# Patient Record
Sex: FEMALE | Race: BLACK OR AFRICAN AMERICAN | NOT HISPANIC OR LATINO | Employment: FULL TIME | URBAN - METROPOLITAN AREA
[De-identification: names, ages, dates, MRNs, and addresses within clinical notes are randomized per-mention and may not be internally consistent; named-entity substitution may affect disease eponyms.]

---

## 2017-03-03 DIAGNOSIS — M54.32 SCIATICA OF LEFT SIDE: ICD-10-CM

## 2017-03-03 RX ORDER — DICLOFENAC SODIUM 50 MG/1
50 TABLET, DELAYED RELEASE ORAL 2 TIMES DAILY
Qty: 60 TABLET | Refills: 2 | Status: SHIPPED | OUTPATIENT
Start: 2017-03-03 | End: 2017-03-17

## 2017-03-03 NOTE — TELEPHONE ENCOUNTER
----- Message from Cecelia Mccarty sent at 3/3/2017 11:17 AM CST -----  Contact: [facility]  REFILL: diclofenac (VOLTAREN) 50 MG EC tablet    PLEASE SEND TO [facility]

## 2017-03-17 ENCOUNTER — OFFICE VISIT (OUTPATIENT)
Dept: FAMILY MEDICINE | Facility: CLINIC | Age: 62
End: 2017-03-17
Payer: COMMERCIAL

## 2017-03-17 ENCOUNTER — LAB VISIT (OUTPATIENT)
Dept: LAB | Facility: HOSPITAL | Age: 62
End: 2017-03-17
Attending: FAMILY MEDICINE
Payer: COMMERCIAL

## 2017-03-17 VITALS
HEART RATE: 105 BPM | SYSTOLIC BLOOD PRESSURE: 100 MMHG | TEMPERATURE: 98 F | WEIGHT: 293 LBS | OXYGEN SATURATION: 97 % | HEIGHT: 62 IN | BODY MASS INDEX: 53.92 KG/M2 | DIASTOLIC BLOOD PRESSURE: 72 MMHG

## 2017-03-17 DIAGNOSIS — I10 ESSENTIAL HYPERTENSION: ICD-10-CM

## 2017-03-17 DIAGNOSIS — I10 ESSENTIAL HYPERTENSION: Primary | ICD-10-CM

## 2017-03-17 DIAGNOSIS — R73.03 PREDIABETES: ICD-10-CM

## 2017-03-17 DIAGNOSIS — E66.01 MORBID OBESITY WITH BMI OF 60.0-69.9, ADULT: ICD-10-CM

## 2017-03-17 LAB
ALBUMIN SERPL BCP-MCNC: 3.7 G/DL
ALP SERPL-CCNC: 85 U/L
ALT SERPL W/O P-5'-P-CCNC: 25 U/L
ANION GAP SERPL CALC-SCNC: 9 MMOL/L
AST SERPL-CCNC: 23 U/L
BASOPHILS # BLD AUTO: 0.01 K/UL
BASOPHILS NFR BLD: 0.1 %
BILIRUB SERPL-MCNC: 0.6 MG/DL
BUN SERPL-MCNC: 14 MG/DL
CALCIUM SERPL-MCNC: 9.8 MG/DL
CHLORIDE SERPL-SCNC: 103 MMOL/L
CHOLEST/HDLC SERPL: 5.3 {RATIO}
CO2 SERPL-SCNC: 29 MMOL/L
CREAT SERPL-MCNC: 0.8 MG/DL
DIFFERENTIAL METHOD: NORMAL
EOSINOPHIL # BLD AUTO: 0.1 K/UL
EOSINOPHIL NFR BLD: 0.9 %
ERYTHROCYTE [DISTWIDTH] IN BLOOD BY AUTOMATED COUNT: 14.3 %
EST. GFR  (AFRICAN AMERICAN): >60 ML/MIN/1.73 M^2
EST. GFR  (NON AFRICAN AMERICAN): >60 ML/MIN/1.73 M^2
GLUCOSE SERPL-MCNC: 106 MG/DL
HCT VFR BLD AUTO: 41.1 %
HDL/CHOLESTEROL RATIO: 18.8 %
HDLC SERPL-MCNC: 191 MG/DL
HDLC SERPL-MCNC: 36 MG/DL
HGB BLD-MCNC: 13.3 G/DL
LDLC SERPL CALC-MCNC: 135.6 MG/DL
LYMPHOCYTES # BLD AUTO: 1.6 K/UL
LYMPHOCYTES NFR BLD: 20.1 %
MCH RBC QN AUTO: 27 PG
MCHC RBC AUTO-ENTMCNC: 32.4 %
MCV RBC AUTO: 83 FL
MONOCYTES # BLD AUTO: 0.6 K/UL
MONOCYTES NFR BLD: 7 %
NEUTROPHILS # BLD AUTO: 5.8 K/UL
NEUTROPHILS NFR BLD: 71.7 %
NONHDLC SERPL-MCNC: 155 MG/DL
PLATELET # BLD AUTO: 223 K/UL
PMV BLD AUTO: 11.7 FL
POTASSIUM SERPL-SCNC: 4 MMOL/L
PROT SERPL-MCNC: 8.2 G/DL
RBC # BLD AUTO: 4.93 M/UL
SODIUM SERPL-SCNC: 141 MMOL/L
TRIGL SERPL-MCNC: 97 MG/DL
WBC # BLD AUTO: 8.1 K/UL

## 2017-03-17 PROCEDURE — 3078F DIAST BP <80 MM HG: CPT | Mod: S$GLB,,, | Performed by: FAMILY MEDICINE

## 2017-03-17 PROCEDURE — 36415 COLL VENOUS BLD VENIPUNCTURE: CPT | Mod: PO

## 2017-03-17 PROCEDURE — 99214 OFFICE O/P EST MOD 30 MIN: CPT | Mod: S$GLB,,, | Performed by: FAMILY MEDICINE

## 2017-03-17 PROCEDURE — 1160F RVW MEDS BY RX/DR IN RCRD: CPT | Mod: S$GLB,,, | Performed by: FAMILY MEDICINE

## 2017-03-17 PROCEDURE — 3074F SYST BP LT 130 MM HG: CPT | Mod: S$GLB,,, | Performed by: FAMILY MEDICINE

## 2017-03-17 PROCEDURE — 80061 LIPID PANEL: CPT

## 2017-03-17 PROCEDURE — 99999 PR PBB SHADOW E&M-EST. PATIENT-LVL III: CPT | Mod: PBBFAC,,, | Performed by: FAMILY MEDICINE

## 2017-03-17 PROCEDURE — 80053 COMPREHEN METABOLIC PANEL: CPT

## 2017-03-17 PROCEDURE — 85025 COMPLETE CBC W/AUTO DIFF WBC: CPT

## 2017-03-17 PROCEDURE — 83036 HEMOGLOBIN GLYCOSYLATED A1C: CPT

## 2017-03-17 RX ORDER — DIETHYLPROPION HYDROCHLORIDE 75 MG/1
75 TABLET, EXTENDED RELEASE ORAL DAILY
Qty: 30 TABLET | Refills: 0 | Status: SHIPPED | OUTPATIENT
Start: 2017-03-17 | End: 2017-03-17 | Stop reason: SDUPTHER

## 2017-03-17 RX ORDER — DIETHYLPROPION HYDROCHLORIDE 75 MG/1
75 TABLET, EXTENDED RELEASE ORAL DAILY
Qty: 30 TABLET | Refills: 0 | Status: SHIPPED | OUTPATIENT
Start: 2017-03-17 | End: 2017-06-27 | Stop reason: SDUPTHER

## 2017-03-17 NOTE — PROGRESS NOTES
Office Visit    Patient Name: Hilary Wilson    : 1955  MRN: [id number]    Subjective:  Hilary is a 61 y.o. female who presents today for:    Establish Care (discuss weightloss) and Recurrent Skin Infections (vaginal area )      This patient has multiple medical diagnoses as noted below.  This patient is new to me.  She would like to improve her health.  She would like to change her habits.  She wants to change from not doing something to doing something.  Medication did help with her effort.  She would like to join weight watchers.  She would like a support system.  Her goal weight is around 175 (no established goal but would like to be around this weight.  Her greatest hinderance in her diet is the not eating.  She eats once a day.  She cares for her mom.  Her job requires walking from door to door.  She wants to improve her health.          Active Problem List  Patient Active Problem List   Diagnosis    Hypertension    Prediabetes    JOSUÉ on CPAP       Past Surgical History  Past Surgical History:   Procedure Laterality Date    BREAST SURGERY      biopsy-benign    COLONOSCOPY N/A 11/3/2016    Procedure: COLONOSCOPY;  Surgeon: Zhang Diez MD;  Location: [facility];  Service: Endoscopy;  Laterality: N/A;    mass removal      TUBAL LIGATION         Family History  Family History   Problem Relation Age of Onset    Hypertension Mother     Kidney disease Mother     Diabetes Father     Stroke Father     Heart disease Sister 49    Kidney disease Sister     Stroke Sister      in 50s       Social History  Social History     Social History    Marital status:      Spouse name: N/A    Number of children: N/A    Years of education: N/A     Occupational History    Not on file.     Social History Main Topics    Smoking status: Never Smoker    Smokeless tobacco: Not on file    Alcohol use No    Drug use: Not on file    Sexual activity: Not on file     Other Topics Concern    Not on file  "    Social History Narrative       Current Medications  Medications reviewed and updated.     Allergies   Review of patient's allergies indicates:  No Known Allergies    Review of Systems (Pertinent positives)  Review of Systems   Constitutional: Negative for activity change, appetite change, fatigue, fever and unexpected weight change.   HENT: Negative.  Negative for ear discharge, ear pain, rhinorrhea and sore throat.    Eyes: Negative.    Respiratory: Negative for apnea, cough, chest tightness, shortness of breath and wheezing.    Cardiovascular: Negative for chest pain, palpitations and leg swelling.   Gastrointestinal: Negative for abdominal distention, abdominal pain, constipation, diarrhea and vomiting.   Endocrine: Negative for cold intolerance, heat intolerance, polydipsia and polyuria.   Genitourinary: Negative for decreased urine volume, menstrual problem, urgency, vaginal bleeding, vaginal discharge and vaginal pain.   Musculoskeletal: Negative.    Skin: Negative for rash.   Neurological: Negative for dizziness and headaches.   Hematological: Does not bruise/bleed easily.   Psychiatric/Behavioral: Negative for agitation, sleep disturbance and suicidal ideas.       /72 (BP Location: Left arm, Patient Position: Sitting, BP Method: Manual)  Pulse 105  Temp 98.4 °F (36.9 °C) (Oral)   Ht 5' 2" (1.575 m)  Wt (!) 156.8 kg (345 lb 10.9 oz)  LMP 05/23/2014  SpO2 97%  BMI 63.23 kg/m2    Physical Exam   Constitutional: She is oriented to person, place, and time. She appears well-developed and well-nourished.   HENT:   Head: Normocephalic and atraumatic.   Right Ear: External ear normal.   Left Ear: External ear normal.   Nose: Nose normal.   Mouth/Throat: Oropharynx is clear and moist.   Eyes: Conjunctivae and EOM are normal. Pupils are equal, round, and reactive to light.   Neck: Normal range of motion. No JVD present. No thyromegaly present.   Cardiovascular: Normal rate, regular rhythm and normal " heart sounds.    Pulmonary/Chest: Effort normal and breath sounds normal. She has no wheezes.   Abdominal: Soft. Bowel sounds are normal. She exhibits no distension. There is no tenderness.   Musculoskeletal: Normal range of motion.   Lymphadenopathy:     She has no cervical adenopathy.   Neurological: She is alert and oriented to person, place, and time.   Skin: Skin is warm and dry.   Psychiatric: She has a normal mood and affect. Her behavior is normal.   Vitals reviewed.      Health Maintenance  Health Maintenance Topics with due status: Not Due       Topic Last Completion Date    Pap Smear 07/15/2015    Mammogram 11/02/2016    Colonoscopy 11/03/2016    Lipid Panel 03/17/2017       Assessment/Plan:  Hilary Wilson is a 61 y.o. female who presents today for :    Hilary was seen today for establish care and recurrent skin infections.    Diagnoses and all orders for this visit:    Essential hypertension  -     CBC auto differential; Future  -     Comprehensive metabolic panel; Future  -     Lipid panel; Future  -     Hemoglobin A1c; Future  -  Pt is currently stable on medication regimen.  Continue current therapy as scheduled.  Contact office with any questions about adjustments on medications.     Prediabetes  -     Comprehensive metabolic panel; Future  -     Hemoglobin A1c; Future  -  She will need to focus on weight loss at this time   -  Discussed in detail the type of diet to address both her blood pressure and diabetes     Morbid obesity with BMI of 60.0-69.9, adult  -     Discontinue: diethylpropion 75 mg TbSR; Take 75 mg by mouth once daily.  -     diethylpropion 75 mg TbSR; Take 75 mg by mouth once daily.  -   I have discussed the common side effects of this medication with the patient and answered all of the questions they had at the time of this visit regarding this medication.  -- follow up in 4 weeks     Other orders  -     Cancel: Tdap Vaccine      No Follow-up on file.

## 2017-03-18 LAB
ESTIMATED AVG GLUCOSE: 137 MG/DL
HBA1C MFR BLD HPLC: 6.4 %

## 2017-03-20 ENCOUNTER — TELEPHONE (OUTPATIENT)
Dept: FAMILY MEDICINE | Facility: CLINIC | Age: 62
End: 2017-03-20

## 2017-03-20 NOTE — TELEPHONE ENCOUNTER
----- Message from Landon Woods sent at 3/9/2017 11:11 AM CST -----  Contact: Daughter/[phone]  Patient would like to speak to the staff regarding a personal matter. She states that she has an appointment tomorrow. Thank you.

## 2017-03-21 ENCOUNTER — TELEPHONE (OUTPATIENT)
Dept: FAMILY MEDICINE | Facility: CLINIC | Age: 62
End: 2017-03-21

## 2017-03-21 NOTE — TELEPHONE ENCOUNTER
----- Message from Ene Dorantes MD sent at 3/20/2017  9:36 AM CDT -----  Your are still a prediabetic.  You have not changed from last year.  Remember to focus on your diet at this time.  We can help you reach your goal.  I will see you in four weeks.

## 2017-04-19 RX ORDER — SIMVASTATIN 20 MG/1
20 TABLET, FILM COATED ORAL NIGHTLY
Qty: 90 TABLET | Refills: 0 | Status: SHIPPED | OUTPATIENT
Start: 2017-04-19 | End: 2018-09-19

## 2017-04-19 RX ORDER — ALBUTEROL SULFATE 90 UG/1
AEROSOL, METERED RESPIRATORY (INHALATION)
Qty: 3 INHALER | Refills: 2 | Status: SHIPPED | OUTPATIENT
Start: 2017-04-19 | End: 2017-08-25 | Stop reason: SDUPTHER

## 2017-06-12 ENCOUNTER — OFFICE VISIT (OUTPATIENT)
Dept: FAMILY MEDICINE | Facility: CLINIC | Age: 62
End: 2017-06-12
Payer: COMMERCIAL

## 2017-06-12 ENCOUNTER — TELEPHONE (OUTPATIENT)
Dept: FAMILY MEDICINE | Facility: CLINIC | Age: 62
End: 2017-06-12

## 2017-06-12 VITALS
HEIGHT: 62 IN | SYSTOLIC BLOOD PRESSURE: 124 MMHG | RESPIRATION RATE: 18 BRPM | TEMPERATURE: 99 F | OXYGEN SATURATION: 98 % | HEART RATE: 100 BPM | BODY MASS INDEX: 53.92 KG/M2 | DIASTOLIC BLOOD PRESSURE: 82 MMHG | WEIGHT: 293 LBS

## 2017-06-12 DIAGNOSIS — M54.42 ACUTE BILATERAL LOW BACK PAIN WITH BILATERAL SCIATICA: Primary | ICD-10-CM

## 2017-06-12 DIAGNOSIS — M54.41 ACUTE BILATERAL LOW BACK PAIN WITH BILATERAL SCIATICA: Primary | ICD-10-CM

## 2017-06-12 DIAGNOSIS — G47.33 OSA ON CPAP: ICD-10-CM

## 2017-06-12 DIAGNOSIS — I10 ESSENTIAL HYPERTENSION: ICD-10-CM

## 2017-06-12 DIAGNOSIS — R73.03 PREDIABETES: ICD-10-CM

## 2017-06-12 PROCEDURE — 99214 OFFICE O/P EST MOD 30 MIN: CPT | Mod: 25,S$GLB,, | Performed by: INTERNAL MEDICINE

## 2017-06-12 PROCEDURE — 99999 PR PBB SHADOW E&M-EST. PATIENT-LVL III: CPT | Mod: PBBFAC,,, | Performed by: INTERNAL MEDICINE

## 2017-06-12 PROCEDURE — 96372 THER/PROPH/DIAG INJ SC/IM: CPT | Mod: S$GLB,,, | Performed by: INTERNAL MEDICINE

## 2017-06-12 RX ORDER — TRAMADOL HYDROCHLORIDE 50 MG/1
50 TABLET ORAL EVERY 6 HOURS PRN
Qty: 20 TABLET | Refills: 0 | Status: SHIPPED | OUTPATIENT
Start: 2017-06-12 | End: 2017-06-22

## 2017-06-12 RX ORDER — KETOROLAC TROMETHAMINE 30 MG/ML
60 INJECTION, SOLUTION INTRAMUSCULAR; INTRAVENOUS
Status: COMPLETED | OUTPATIENT
Start: 2017-06-12 | End: 2017-06-12

## 2017-06-12 RX ORDER — IBUPROFEN 800 MG/1
800 TABLET ORAL
Qty: 60 TABLET | Refills: 1 | Status: SHIPPED | OUTPATIENT
Start: 2017-06-12 | End: 2017-09-18 | Stop reason: SDUPTHER

## 2017-06-12 RX ADMIN — KETOROLAC TROMETHAMINE 60 MG: 30 INJECTION, SOLUTION INTRAMUSCULAR; INTRAVENOUS at 03:06

## 2017-06-12 NOTE — PROGRESS NOTES
SUBJECTIVE     Chief Complaint   Patient presents with    Sciatica     pain started 5d ago       HPI  Hilary Wilson is a 62 y.o. female with multiple medical diagnoses as listed in the medical history and problem list that presents for evaluation of sciatica x 5 days. Pt reports low back pain with radiation down both legs. Pain is sharp and throbbing at an 8-9/10 and intermittent in nature. Denies any recent trauma, falls, or heavy lifting. Denies any urinary complaints, but has some constipation.     PAST MEDICAL HISTORY:  Past Medical History:   Diagnosis Date    Asthma     Hyperlipidemia     Hypertension     JOSUÉ on CPAP        PAST SURGICAL HISTORY:  Past Surgical History:   Procedure Laterality Date    BREAST SURGERY      biopsy-benign    COLONOSCOPY N/A 11/3/2016    Procedure: COLONOSCOPY;  Surgeon: Zhang Diez MD;  Location: [facility];  Service: Endoscopy;  Laterality: N/A;    mass removal      TUBAL LIGATION         SOCIAL HISTORY:  Social History     Social History    Marital status:      Spouse name: N/A    Number of children: N/A    Years of education: N/A     Occupational History    Not on file.     Social History Main Topics    Smoking status: Never Smoker    Smokeless tobacco: Not on file    Alcohol use No    Drug use: Unknown    Sexual activity: Not on file     Other Topics Concern    Not on file     Social History Narrative    No narrative on file       FAMILY HISTORY:  Family History   Problem Relation Age of Onset    Hypertension Mother     Kidney disease Mother     Diabetes Father     Stroke Father     Heart disease Sister 49    Kidney disease Sister     Stroke Sister      in 50s       ALLERGIES AND MEDICATIONS: updated and reviewed.  Review of patient's allergies indicates:  No Known Allergies  Current Outpatient Prescriptions   Medication Sig Dispense Refill    albuterol (PROAIR HFA) 90 mcg/actuation inhaler Inhale 2 puffs into the lungs every 6 (six)  "hours as needed for Wheezing. 18 g 2    albuterol (PROAIR HFA) 90 mcg/actuation inhaler INHALE 2 PUFFS ORALLY EVERY SIX HOURS AS NEEDED FOR WHEEZING 3 Inhaler 2    diethylpropion 75 mg TbSR Take 75 mg by mouth once daily. 30 tablet 0    hydrochlorothiazide (HYDRODIURIL) 25 MG tablet TAKE 1 TABLET BY MOUTH DAILY 90 tablet 2    ibuprofen (ADVIL,MOTRIN) 800 MG tablet Take 1 tablet (800 mg total) by mouth every meal as needed for Pain. 60 tablet 1    simvastatin (ZOCOR) 20 MG tablet Take 1 tablet (20 mg total) by mouth every evening. 90 tablet 0    tramadol (ULTRAM) 50 mg tablet Take 1 tablet (50 mg total) by mouth every 6 (six) hours as needed for Pain. 20 tablet 0     No current facility-administered medications for this visit.        ROS  Review of Systems   Constitutional: Negative for chills and fever.   HENT: Negative for hearing loss and sore throat.    Eyes: Negative for visual disturbance.   Respiratory: Negative for cough and shortness of breath.    Cardiovascular: Negative for chest pain, palpitations and leg swelling.   Gastrointestinal: Negative for abdominal pain, constipation, diarrhea, nausea and vomiting.   Genitourinary: Negative for dysuria, frequency and urgency.   Musculoskeletal: Positive for back pain. Negative for arthralgias, joint swelling and myalgias.   Skin: Negative for rash and wound.   Neurological: Negative for headaches.   Psychiatric/Behavioral: Negative for agitation and confusion. The patient is not nervous/anxious.          OBJECTIVE     Physical Exam  Vitals:    06/12/17 1449   BP: 124/82   Pulse: 100   Resp: 18   Temp: 99.4 °F (37.4 °C)    Body mass index is 62.86 kg/m².  Weight: (!) 155.9 kg (343 lb 11.2 oz)   Height: 5' 2" (157.5 cm)     Physical Exam   Constitutional: She is oriented to person, place, and time. She appears well-developed and well-nourished. No distress.   HENT:   Head: Normocephalic and atraumatic.   Right Ear: External ear normal.   Left Ear: External " ear normal.   Nose: Nose normal.   Mouth/Throat: Oropharynx is clear and moist.   Eyes: Conjunctivae and EOM are normal. Right eye exhibits no discharge. Left eye exhibits no discharge. No scleral icterus.   Neck: Normal range of motion. Neck supple. No JVD present. No tracheal deviation present.   Cardiovascular: Normal rate, regular rhythm and intact distal pulses.  Exam reveals no gallop and no friction rub.    No murmur heard.  Pulmonary/Chest: Effort normal and breath sounds normal. No respiratory distress. She has no wheezes.   Abdominal: Soft. Bowel sounds are normal. She exhibits no distension and no mass. There is no tenderness. There is no rebound and no guarding.   Musculoskeletal: Normal range of motion. She exhibits no edema, tenderness or deformity.   Negative BLE straight leg raise   Neurological: She is alert and oriented to person, place, and time. She exhibits normal muscle tone. Coordination normal.   Skin: Skin is warm and dry. No rash noted. No erythema.   Psychiatric: She has a normal mood and affect. Her behavior is normal. Judgment and thought content normal.         Health Maintenance       Date Due Completion Date    TETANUS VACCINE 03/29/1973 ---    Influenza Vaccine 08/01/2017 3/17/2017 (Declined)    Override on 3/17/2017: Declined    Mammogram 11/02/2017 11/2/2016    Pap Smear with HPV Cotest 07/15/2018 7/15/2015    Lipid Panel 03/17/2022 3/17/2017    Colonoscopy 11/03/2026 11/3/2016    Override on 5/7/2010: Done (Dr. Roberts, [facility])            ASSESSMENT     62 y.o. female with     1. Acute bilateral low back pain with bilateral sciatica    2. Essential hypertension    3. JOSUÉ on CPAP    4. Prediabetes    5. BMI 60.0-69.9, adult        PLAN:     1. Acute bilateral low back pain with bilateral sciatica  - Pt advised to rest and apply ice to lower back for next 48-72 hours and then switch to heating pad with care not to burn herself  - ketorolac injection 60 mg; Inject 2 mLs (60 mg total)  into the muscle one time.  - ibuprofen (ADVIL,MOTRIN) 800 MG tablet; Take 1 tablet (800 mg total) by mouth every meal as needed for Pain.  Dispense: 60 tablet; Refill: 1  - tramadol (ULTRAM) 50 mg tablet; Take 1 tablet (50 mg total) by mouth every 6 (six) hours as needed for Pain.  Dispense: 20 tablet; Refill: 0    2. Essential hypertension  - BP well controlled; at goal of <140/90  - The current medical regimen is effective;  continue present plan and medications.    3. JOSUÉ on CPAP  - Stable; no acute issues  - The current medical regimen is effective;  continue present plan and medications.    4. Prediabetes  - Continue prudent diet    5. BMI 60.0-69.9, adult  - Discussed importance of eating a prudent diet and exercising        RTC in 2 weeks as needed for any acute worsening of current condition or failure to improve     Yadira Lozano MD  06/12/2017 2:55 PM        No Follow-up on file.

## 2017-06-12 NOTE — TELEPHONE ENCOUNTER
----- Message from Ailyn Dixon sent at 6/12/2017  3:27 PM CDT -----  Contact: [facility]   [facility] need the frequency for medication. Please call.    ibuprofen (ADVIL,MOTRIN) 800 MG tablet      [facility] [phone]

## 2017-06-27 ENCOUNTER — OFFICE VISIT (OUTPATIENT)
Dept: FAMILY MEDICINE | Facility: CLINIC | Age: 62
End: 2017-06-27
Payer: COMMERCIAL

## 2017-06-27 ENCOUNTER — TELEPHONE (OUTPATIENT)
Dept: FAMILY MEDICINE | Facility: CLINIC | Age: 62
End: 2017-06-27

## 2017-06-27 VITALS
HEART RATE: 88 BPM | WEIGHT: 293 LBS | SYSTOLIC BLOOD PRESSURE: 110 MMHG | OXYGEN SATURATION: 96 % | HEIGHT: 62 IN | BODY MASS INDEX: 53.92 KG/M2 | TEMPERATURE: 98 F | DIASTOLIC BLOOD PRESSURE: 60 MMHG

## 2017-06-27 DIAGNOSIS — L03.312 CELLULITIS OF BACK EXCEPT BUTTOCK: Primary | ICD-10-CM

## 2017-06-27 DIAGNOSIS — E66.01 MORBID OBESITY WITH BMI OF 60.0-69.9, ADULT: ICD-10-CM

## 2017-06-27 PROCEDURE — 99214 OFFICE O/P EST MOD 30 MIN: CPT | Mod: S$GLB,,, | Performed by: FAMILY MEDICINE

## 2017-06-27 PROCEDURE — 99999 PR PBB SHADOW E&M-EST. PATIENT-LVL III: CPT | Mod: PBBFAC,,, | Performed by: FAMILY MEDICINE

## 2017-06-27 RX ORDER — AMOXICILLIN 500 MG/1
500 TABLET, FILM COATED ORAL EVERY 12 HOURS
Qty: 20 TABLET | Refills: 0 | Status: SHIPPED | OUTPATIENT
Start: 2017-06-27 | End: 2017-07-07

## 2017-06-27 RX ORDER — DIETHYLPROPION HYDROCHLORIDE 75 MG/1
75 TABLET, EXTENDED RELEASE ORAL DAILY
Qty: 30 TABLET | Refills: 0 | Status: SHIPPED | OUTPATIENT
Start: 2017-06-27 | End: 2017-07-28 | Stop reason: SDUPTHER

## 2017-06-27 NOTE — PROGRESS NOTES
Office Visit    Patient Name: Hilary Wilson    : 1955  MRN: [id number]    Subjective:  Hilary is a 62 y.o. female who presents today for:    Follow-up (weight ) and check bite on back      This patient has multiple medical diagnoses as noted below.  This patient is known to me and to this clinic. She reports for refill on her medication.  She has been out of the medication for some time.  She had noted a 5 lb weight loss when she was on the medication.  No side effects.  She has increased to meals 3 times a day.  She does have a shake once a day.  She will substitute a shake with two meals.  She does not have an appetite to eat 3 times a day.  She has increased fruit intake.    She developed a lesion on her back.  She has been using neosporin.  No noted drainage.  No recent bites.  Patient denies any new symptoms including chest pain, SOB, blurry vision, N/V, diarrhea.  The site has not had drainage.  No pus on evaluation.        Active Problem List  Patient Active Problem List   Diagnosis    Essential hypertension    Prediabetes    JOSUÉ on CPAP    BMI 60.0-69.9, adult       Past Surgical History  Past Surgical History:   Procedure Laterality Date    BREAST SURGERY      biopsy-benign    COLONOSCOPY N/A 11/3/2016    Procedure: COLONOSCOPY;  Surgeon: Zhang Diez MD;  Location: [facility];  Service: Endoscopy;  Laterality: N/A;    mass removal      TUBAL LIGATION         Family History  Family History   Problem Relation Age of Onset    Hypertension Mother     Kidney disease Mother     Diabetes Father     Stroke Father     Heart disease Sister 49    Kidney disease Sister     Stroke Sister      in 50s       Social History  Social History     Social History    Marital status:      Spouse name: N/A    Number of children: N/A    Years of education: N/A     Occupational History    Not on file.     Social History Main Topics    Smoking status: Never Smoker    Smokeless tobacco: Not on  "file    Alcohol use No    Drug use: Unknown    Sexual activity: Not on file     Other Topics Concern    Not on file     Social History Narrative    No narrative on file       Current Medications  Medications reviewed and updated.     Allergies   Review of patient's allergies indicates:  No Known Allergies    Review of Systems (Pertinent positives)  Review of Systems   Constitutional: Negative for activity change, appetite change, fatigue, fever and unexpected weight change.   HENT: Negative.  Negative for ear discharge, ear pain, rhinorrhea and sore throat.    Eyes: Negative.    Respiratory: Negative for apnea, cough, chest tightness, shortness of breath and wheezing.    Cardiovascular: Negative for chest pain, palpitations and leg swelling.   Gastrointestinal: Negative for abdominal distention, abdominal pain, constipation, diarrhea and vomiting.   Endocrine: Negative for cold intolerance, heat intolerance, polydipsia and polyuria.   Genitourinary: Negative for decreased urine volume, menstrual problem, urgency, vaginal bleeding, vaginal discharge and vaginal pain.   Musculoskeletal: Negative.    Skin: Positive for rash.   Neurological: Negative for dizziness and headaches.   Hematological: Does not bruise/bleed easily.   Psychiatric/Behavioral: Negative for agitation, sleep disturbance and suicidal ideas.       /60 (BP Location: Left arm, Patient Position: Sitting, BP Method: Manual)   Pulse 88   Temp 98.4 °F (36.9 °C) (Oral)   Ht 5' 2" (1.575 m)   Wt (!) 156.8 kg (345 lb 10.9 oz)   LMP 05/23/2014   SpO2 96%   BMI 63.23 kg/m²     Physical Exam   Constitutional: She is oriented to person, place, and time. She appears well-developed and well-nourished.   HENT:   Head: Normocephalic.   Right Ear: External ear normal.   Left Ear: External ear normal.   Nose: Nose normal.   Mouth/Throat: Oropharynx is clear and moist.   Eyes: Conjunctivae and EOM are normal. Pupils are equal, round, and reactive to " light.   Cardiovascular: Normal rate, regular rhythm and normal heart sounds.    Pulmonary/Chest: Effort normal and breath sounds normal.   Neurological: She is alert and oriented to person, place, and time.   Skin: Skin is warm and dry.        Vitals reviewed.      Health Maintenance  Health Maintenance Topics with due status: Not Due       Topic Last Completion Date    Pap Smear with HPV Cotest 07/15/2015    Mammogram 11/02/2016    Colonoscopy 11/03/2016    Influenza Vaccine 03/17/2017    Lipid Panel 03/17/2017       Assessment/Plan:  Hilary Wilson is a 62 y.o. female who presents today for :    Hilary was seen today for follow-up and check bite on back.    Diagnoses and all orders for this visit:    Cellulitis of back except buttock  -     amoxicillin (AMOXIL) 500 MG Tab; Take 1 tablet (500 mg total) by mouth every 12 (twelve) hours.  -    I have discussed the common side effects of this medication with the patient and answered all of the questions they had at the time of this visit regarding this medication.  -  RTC if symptoms worsen or persist.    Morbid obesity with BMI of 60.0-69.9, adult  -     diethylpropion 75 mg TbSR; Take 75 mg by mouth once daily.  -  Patient warned of common side effects of diethylpropion including anxiety, insomnia, palpitations and increased blood pressure. It was also explained that it is for short-term usage along with diet and exercise, and that stopping the medication without making lifestyle changes will result in regain of weight. Patient states understanding.     3 meals a day made up of the following:  Unlimited green vegetables, meat, poultry, seafood, eggs and hard cheeses.   Avoid fried foods  Dressings, seasonings, condiments, etc should have less than 2 g sugars.   beans or nuts can have 1 x a day.   2-3 servings of citrus fruits, berries, pineapple or melon a day (1 cup)  Milk and plain yogurt     No soda, sweet tea, juices or lemonade     Weight loss medications are  controlled substances. They require routine follow up. Prescription or pills that are lost or destroyed will not be replaced.       Return in about 4 weeks (around 7/25/2017).

## 2017-07-28 ENCOUNTER — OFFICE VISIT (OUTPATIENT)
Dept: FAMILY MEDICINE | Facility: CLINIC | Age: 62
End: 2017-07-28
Payer: COMMERCIAL

## 2017-07-28 VITALS
WEIGHT: 293 LBS | BODY MASS INDEX: 53.92 KG/M2 | DIASTOLIC BLOOD PRESSURE: 74 MMHG | OXYGEN SATURATION: 98 % | SYSTOLIC BLOOD PRESSURE: 120 MMHG | HEART RATE: 112 BPM | HEIGHT: 62 IN | TEMPERATURE: 99 F

## 2017-07-28 DIAGNOSIS — I10 ESSENTIAL HYPERTENSION: Primary | ICD-10-CM

## 2017-07-28 DIAGNOSIS — E66.01 MORBID OBESITY WITH BMI OF 60.0-69.9, ADULT: ICD-10-CM

## 2017-07-28 PROCEDURE — 99214 OFFICE O/P EST MOD 30 MIN: CPT | Mod: S$GLB,,, | Performed by: FAMILY MEDICINE

## 2017-07-28 PROCEDURE — 3074F SYST BP LT 130 MM HG: CPT | Mod: S$GLB,,, | Performed by: FAMILY MEDICINE

## 2017-07-28 PROCEDURE — 3008F BODY MASS INDEX DOCD: CPT | Mod: S$GLB,,, | Performed by: FAMILY MEDICINE

## 2017-07-28 PROCEDURE — 3078F DIAST BP <80 MM HG: CPT | Mod: S$GLB,,, | Performed by: FAMILY MEDICINE

## 2017-07-28 PROCEDURE — 99999 PR PBB SHADOW E&M-EST. PATIENT-LVL III: CPT | Mod: PBBFAC,,, | Performed by: FAMILY MEDICINE

## 2017-07-28 RX ORDER — DIETHYLPROPION HYDROCHLORIDE 75 MG/1
75 TABLET, EXTENDED RELEASE ORAL DAILY
Qty: 30 TABLET | Refills: 0 | Status: SHIPPED | OUTPATIENT
Start: 2017-07-28 | End: 2017-08-25 | Stop reason: SDUPTHER

## 2017-07-28 RX ORDER — HYDROCHLOROTHIAZIDE 25 MG/1
25 TABLET ORAL DAILY
Qty: 90 TABLET | Refills: 2 | Status: SHIPPED | OUTPATIENT
Start: 2017-07-28 | End: 2017-11-09 | Stop reason: SDUPTHER

## 2017-08-05 NOTE — PROGRESS NOTES
Office Visit    Patient Name: Hilary Urbano    : 1955  MRN: [id number]    Subjective:  Hilary is a 62 y.o. female who presents today for:    Weight Check and Medication Refill      This patient has multiple medical diagnoses as noted below.  This patient is known to me and to this clinic. She has been en working daily on her diet in order to improve her weight loss.  She was last evaluated in  of this year.  She continues to not eat at least 3 times a day.  We discussed the importance of snacks including addition of nuts and cashews to her diet.    HTN:  Adherence with meds? Yes  Home BP range: N/A  Side effects: No  Following a low salt diet ? No  Current exercise? No  Need of refills? No  Recent changes in blood pressure medication: No  Patient has been in pain or taking decongestants/anti-inflammatory/OCP/SSRI medication: No  Patient has other symptoms (headache, weakness,  blurry vision, chest pain, palpitations, etc): No        Patient Active Problem List   Diagnosis    Essential hypertension    Prediabetes    JOSUÉ on CPAP    BMI 60.0-69.9, adult       Past Surgical History:   Procedure Laterality Date    BREAST SURGERY      biopsy-benign    COLONOSCOPY N/A 11/3/2016    Procedure: COLONOSCOPY;  Surgeon: Zhang Diez MD;  Location: [facility];  Service: Endoscopy;  Laterality: N/A;    mass removal      TUBAL LIGATION         Family History   Problem Relation Age of Onset    Hypertension Mother     Kidney disease Mother     Diabetes Father     Stroke Father     Heart disease Sister 49    Kidney disease Sister     Stroke Sister      in 50s       Social History     Social History    Marital status:      Spouse name: N/A    Number of children: N/A    Years of education: N/A     Occupational History    Not on file.     Social History Main Topics    Smoking status: Never Smoker    Smokeless tobacco: Not on file    Alcohol use No    Drug use: Unknown    Sexual  activity: Not on file     Other Topics Concern    Not on file     Social History Narrative    No narrative on file       Current Medications  Medications reviewed and updated.     Allergies   Review of patient's allergies indicates:  No Known Allergies      Labs  Lab Results   Component Value Date    HGBA1C 6.4 (H) 03/17/2017     Lab Results   Component Value Date     03/17/2017    K 4.0 03/17/2017     03/17/2017    CO2 29 03/17/2017    BUN 14 03/17/2017    CREATININE 0.8 03/17/2017    CALCIUM 9.8 03/17/2017    ANIONGAP 9 03/17/2017    ESTGFRAFRICA >60.0 03/17/2017    EGFRNONAA >60.0 03/17/2017     Lab Results   Component Value Date    CHOL 191 03/17/2017    CHOL 152 07/06/2015     Lab Results   Component Value Date    HDL 36 (L) 03/17/2017    HDL 34 (L) 07/06/2015     Lab Results   Component Value Date    LDLCALC 135.6 03/17/2017    LDLCALC 96.2 07/06/2015     Lab Results   Component Value Date    TRIG 97 03/17/2017    TRIG 109 07/06/2015     Lab Results   Component Value Date    CHOLHDL 18.8 (L) 03/17/2017    CHOLHDL 22.4 07/06/2015     Last set of blood work has been reviewed as noted above.    Review of Systems   Constitutional: Negative for activity change, appetite change, fatigue, fever and unexpected weight change.   HENT: Negative.  Negative for ear discharge, ear pain, rhinorrhea and sore throat.    Eyes: Negative.    Respiratory: Negative for apnea, cough, chest tightness, shortness of breath and wheezing.    Cardiovascular: Negative for chest pain, palpitations and leg swelling.   Gastrointestinal: Negative for abdominal distention, abdominal pain, constipation, diarrhea and vomiting.   Endocrine: Negative for cold intolerance, heat intolerance, polydipsia and polyuria.   Genitourinary: Negative for decreased urine volume, menstrual problem, urgency, vaginal bleeding, vaginal discharge and vaginal pain.   Musculoskeletal: Negative.    Skin: Negative for rash.   Neurological: Negative for  "dizziness and headaches.   Hematological: Does not bruise/bleed easily.   Psychiatric/Behavioral: Negative for agitation, sleep disturbance and suicidal ideas.       /74   Pulse (!) 112   Temp 99.2 °F (37.3 °C) (Oral)   Ht 5' 2" (1.575 m)   Wt (!) 152.6 kg (336 lb 6.8 oz)   LMP 05/23/2014   SpO2 98%   BMI 61.53 kg/m²      Physical Exam   Constitutional: She is oriented to person, place, and time. She appears well-developed and well-nourished.   HENT:   Head: Normocephalic.   Right Ear: External ear normal.   Left Ear: External ear normal.   Nose: Nose normal.   Mouth/Throat: Oropharynx is clear and moist.   Eyes: Conjunctivae and EOM are normal. Pupils are equal, round, and reactive to light.   Cardiovascular: Normal rate, regular rhythm and normal heart sounds.    Pulmonary/Chest: Effort normal and breath sounds normal.   Neurological: She is alert and oriented to person, place, and time.   Skin: Skin is warm and dry.   Vitals reviewed.      Health Maintenance  Health Maintenance       Date Due Completion Date    TETANUS VACCINE 03/29/1973 ---    Influenza Vaccine 08/01/2017 3/17/2017 (Declined)    Override on 3/17/2017: Declined    Mammogram 11/02/2017 11/2/2016    Pap Smear with HPV Cotest 07/15/2018 7/15/2015    Lipid Panel 03/17/2022 3/17/2017    Colonoscopy 11/03/2026 11/3/2016    Override on 5/7/2010: Done (Dr. Roberts, [place] )          Assessment/Plan:  Hilary Urbano is a 62 y.o. female who presents today for :    1. Essential hypertension    2. Morbid obesity with BMI of 60.0-69.9, adult        Problem List Items Addressed This Visit        Unprioritized    Essential hypertension - Primary  -   Pt is currently stable on medication regimen.  Continue current therapy as scheduled.  Contact office with any questions about adjustments on medications.   -  No noted elevation in blood pressure with current medication.           Other Visit Diagnoses     Morbid obesity with BMI of 60.0-69.9, " adult        Relevant Medications    diethylpropion 75 mg TbSR  -   I have discussed the common side effects of this medication with the patient and answered all of the questions they had at the time of this visit regarding this medication.  -  Very successful with weight loss   -  Continue with dietary efforts   -  Consider possible surgery           No Follow-up on file.

## 2017-08-25 ENCOUNTER — OFFICE VISIT (OUTPATIENT)
Dept: FAMILY MEDICINE | Facility: CLINIC | Age: 62
End: 2017-08-25
Payer: COMMERCIAL

## 2017-08-25 VITALS
SYSTOLIC BLOOD PRESSURE: 100 MMHG | BODY MASS INDEX: 53.92 KG/M2 | TEMPERATURE: 99 F | DIASTOLIC BLOOD PRESSURE: 60 MMHG | HEART RATE: 85 BPM | HEIGHT: 62 IN | OXYGEN SATURATION: 97 % | WEIGHT: 293 LBS

## 2017-08-25 DIAGNOSIS — L02.32 BOIL OF BUTTOCK: Primary | ICD-10-CM

## 2017-08-25 DIAGNOSIS — E66.01 MORBID OBESITY WITH BMI OF 60.0-69.9, ADULT: ICD-10-CM

## 2017-08-25 PROCEDURE — 99214 OFFICE O/P EST MOD 30 MIN: CPT | Mod: S$GLB,,, | Performed by: FAMILY MEDICINE

## 2017-08-25 PROCEDURE — 3074F SYST BP LT 130 MM HG: CPT | Mod: S$GLB,,, | Performed by: FAMILY MEDICINE

## 2017-08-25 PROCEDURE — 3008F BODY MASS INDEX DOCD: CPT | Mod: S$GLB,,, | Performed by: FAMILY MEDICINE

## 2017-08-25 PROCEDURE — 99999 PR PBB SHADOW E&M-EST. PATIENT-LVL III: CPT | Mod: PBBFAC,,, | Performed by: FAMILY MEDICINE

## 2017-08-25 PROCEDURE — 3078F DIAST BP <80 MM HG: CPT | Mod: S$GLB,,, | Performed by: FAMILY MEDICINE

## 2017-08-25 RX ORDER — DIETHYLPROPION HYDROCHLORIDE 75 MG/1
75 TABLET, EXTENDED RELEASE ORAL DAILY
Qty: 30 TABLET | Refills: 0 | Status: SHIPPED | OUTPATIENT
Start: 2017-08-25 | End: 2017-09-28 | Stop reason: SDUPTHER

## 2017-08-25 RX ORDER — AMOXICILLIN 500 MG/1
500 TABLET, FILM COATED ORAL EVERY 12 HOURS
Qty: 20 TABLET | Refills: 0 | Status: SHIPPED | OUTPATIENT
Start: 2017-08-25 | End: 2017-09-04

## 2017-08-25 RX ORDER — MUPIROCIN 20 MG/G
OINTMENT TOPICAL 3 TIMES DAILY
Qty: 30 G | Refills: 0 | Status: SHIPPED | OUTPATIENT
Start: 2017-08-25 | End: 2019-10-17 | Stop reason: SDUPTHER

## 2017-08-25 NOTE — PROGRESS NOTES
Office Visit    Patient Name: Hilary Urbano    : 1955  MRN: [id number]    Subjective:  Hilary is a 62 y.o. female who presents today for:    Weight Check and Recurrent Skin Infections (back)      This patient has multiple medical diagnoses as noted below.  This patient is known to me and to this clinic. She reports frequent skin infections on her back.  She would like to check her weight today.  She states that she is otherwise doing well.  Patient denies any new symptoms including chest pain, SOB, blurry vision, N/V, diarrhea.        Patient Active Problem List   Diagnosis    Essential hypertension    Prediabetes    JOSUÉ on CPAP    BMI 60.0-69.9, adult       Past Surgical History:   Procedure Laterality Date    BREAST SURGERY      biopsy-benign    COLONOSCOPY N/A 11/3/2016    Procedure: COLONOSCOPY;  Surgeon: Zhang Diez MD;  Location: [facility];  Service: Endoscopy;  Laterality: N/A;    mass removal      TUBAL LIGATION         Family History   Problem Relation Age of Onset    Hypertension Mother     Kidney disease Mother     Diabetes Father     Stroke Father     Heart disease Sister 49    Kidney disease Sister     Stroke Sister      in 50s       Social History     Social History    Marital status:      Spouse name: N/A    Number of children: N/A    Years of education: N/A     Occupational History    Not on file.     Social History Main Topics    Smoking status: Never Smoker    Smokeless tobacco: Not on file    Alcohol use No    Drug use: Unknown    Sexual activity: Not on file     Other Topics Concern    Not on file     Social History Narrative    No narrative on file       Current Medications  Medications reviewed and updated.     Allergies   Review of patient's allergies indicates:  No Known Allergies      Labs  Lab Results   Component Value Date    HGBA1C 6.4 (H) 2017     Lab Results   Component Value Date     2017    K 4.0 2017    CL  "103 03/17/2017    CO2 29 03/17/2017    BUN 14 03/17/2017    CREATININE 0.8 03/17/2017    CALCIUM 9.8 03/17/2017    ANIONGAP 9 03/17/2017    ESTGFRAFRICA >60.0 03/17/2017    EGFRNONAA >60.0 03/17/2017     Lab Results   Component Value Date    CHOL 191 03/17/2017    CHOL 152 07/06/2015     Lab Results   Component Value Date    HDL 36 (L) 03/17/2017    HDL 34 (L) 07/06/2015     Lab Results   Component Value Date    LDLCALC 135.6 03/17/2017    LDLCALC 96.2 07/06/2015     Lab Results   Component Value Date    TRIG 97 03/17/2017    TRIG 109 07/06/2015     Lab Results   Component Value Date    CHOLHDL 18.8 (L) 03/17/2017    CHOLHDL 22.4 07/06/2015     Last set of blood work has been reviewed as noted above.    Review of Systems   Constitutional: Negative for activity change, appetite change, fatigue, fever and unexpected weight change.   HENT: Negative.  Negative for ear discharge, ear pain, rhinorrhea and sore throat.    Eyes: Negative.    Respiratory: Negative for apnea, cough, chest tightness, shortness of breath and wheezing.    Cardiovascular: Negative for chest pain, palpitations and leg swelling.   Gastrointestinal: Negative for abdominal distention, abdominal pain, constipation, diarrhea and vomiting.   Endocrine: Negative for cold intolerance, heat intolerance, polydipsia and polyuria.   Genitourinary: Negative for decreased urine volume, menstrual problem, urgency, vaginal bleeding, vaginal discharge and vaginal pain.   Musculoskeletal: Negative.    Skin: Negative for rash.   Neurological: Negative for dizziness and headaches.   Hematological: Does not bruise/bleed easily.   Psychiatric/Behavioral: Negative for agitation, sleep disturbance and suicidal ideas.       /60 (BP Location: Left arm, Patient Position: Sitting, BP Method: X-Large (Manual))   Pulse 85   Temp 98.6 °F (37 °C) (Oral)   Ht 5' 2" (1.575 m)   Wt (!) 150.4 kg (331 lb 9.2 oz)   LMP 05/23/2014   SpO2 97%   BMI 60.65 kg/m²      Physical " Exam   Constitutional: She is oriented to person, place, and time. She appears well-developed and well-nourished.   HENT:   Head: Normocephalic.   Right Ear: External ear normal.   Left Ear: External ear normal.   Nose: Nose normal.   Mouth/Throat: Oropharynx is clear and moist.   Eyes: Conjunctivae and EOM are normal. Pupils are equal, round, and reactive to light.   Cardiovascular: Normal rate, regular rhythm and normal heart sounds.    Pulmonary/Chest: Effort normal and breath sounds normal.   Musculoskeletal:        Back:    Neurological: She is alert and oriented to person, place, and time.   Skin: Skin is warm and dry.   Vitals reviewed.      Health Maintenance  Health Maintenance       Date Due Completion Date    TETANUS VACCINE 03/29/1973 ---    Influenza Vaccine 08/01/2017 3/17/2017 (Declined)    Override on 3/17/2017: Declined    Mammogram 11/02/2017 11/2/2016    Pap Smear with HPV Cotest 07/15/2018 7/15/2015    Lipid Panel 03/17/2022 3/17/2017    Colonoscopy 11/03/2026 11/3/2016    Override on 5/7/2010: Done (Dr. Roberts, [facility])          Assessment/Plan:  Hilary Urbano is a 62 y.o. female who presents today for :    1. Boil of buttock    2. Morbid obesity with BMI of 60.0-69.9, adult        Problem List Items Addressed This Visit     None      Visit Diagnoses     Boil of buttock    -  Primary    Relevant Medications    mupirocin (BACTROBAN) 2 % ointment    amoxicillin (AMOXIL) 500 MG Tab  -   I have discussed the common side effects of this medication with the patient and answered all of the questions they had at the time of this visit regarding this medication.  -  RTC if symptoms worsen or persist.      Morbid obesity with BMI of 60.0-69.9, adult        Relevant Medications    diethylpropion 75 mg TbSR  - Pt is currently stable on medication regimen.  Continue current therapy as scheduled.  Contact office with any questions about adjustments on medications.   -  rtc in 4 weeks           Return  in about 4 weeks (around 9/22/2017).

## 2017-09-18 DIAGNOSIS — M54.42 ACUTE BILATERAL LOW BACK PAIN WITH BILATERAL SCIATICA: ICD-10-CM

## 2017-09-18 DIAGNOSIS — M54.41 ACUTE BILATERAL LOW BACK PAIN WITH BILATERAL SCIATICA: ICD-10-CM

## 2017-09-18 RX ORDER — IBUPROFEN 800 MG/1
TABLET ORAL
Qty: 60 TABLET | Refills: 1 | Status: SHIPPED | OUTPATIENT
Start: 2017-09-18 | End: 2018-04-03

## 2017-09-28 ENCOUNTER — OFFICE VISIT (OUTPATIENT)
Dept: FAMILY MEDICINE | Facility: CLINIC | Age: 62
End: 2017-09-28
Payer: COMMERCIAL

## 2017-09-28 VITALS
SYSTOLIC BLOOD PRESSURE: 110 MMHG | OXYGEN SATURATION: 95 % | HEART RATE: 85 BPM | TEMPERATURE: 99 F | WEIGHT: 293 LBS | HEIGHT: 62 IN | BODY MASS INDEX: 53.92 KG/M2 | DIASTOLIC BLOOD PRESSURE: 60 MMHG

## 2017-09-28 DIAGNOSIS — E66.01 MORBID OBESITY WITH BMI OF 60.0-69.9, ADULT: ICD-10-CM

## 2017-09-28 DIAGNOSIS — I10 ESSENTIAL HYPERTENSION: Primary | ICD-10-CM

## 2017-09-28 PROCEDURE — 99999 PR PBB SHADOW E&M-EST. PATIENT-LVL III: CPT | Mod: PBBFAC,,, | Performed by: FAMILY MEDICINE

## 2017-09-28 PROCEDURE — 99214 OFFICE O/P EST MOD 30 MIN: CPT | Mod: S$GLB,,, | Performed by: FAMILY MEDICINE

## 2017-09-28 RX ORDER — DIETHYLPROPION HYDROCHLORIDE 75 MG/1
75 TABLET, EXTENDED RELEASE ORAL DAILY
Qty: 30 TABLET | Refills: 0 | Status: SHIPPED | OUTPATIENT
Start: 2017-09-28 | End: 2017-10-26 | Stop reason: SDUPTHER

## 2017-09-28 RX ORDER — AMOXICILLIN 500 MG/1
500 CAPSULE ORAL EVERY 12 HOURS
Refills: 0 | COMMUNITY
Start: 2017-08-27 | End: 2017-09-28

## 2017-10-20 NOTE — PROGRESS NOTES
Office Visit    Patient Name: Hilary Wilson    : 1955  MRN: [id number]    Subjective:  Hilary is a 62 y.o. female who presents today for:    Weight Check      This patient has multiple medical diagnoses as noted below.  This patient is known to me and to this clinic.   She has been doing well.  She      Patient Active Problem List   Diagnosis    Essential hypertension    Prediabetes    JOSUÉ on CPAP    BMI 60.0-69.9, adult       Past Surgical History:   Procedure Laterality Date    BREAST SURGERY      biopsy-benign    COLONOSCOPY N/A 11/3/2016    Procedure: COLONOSCOPY;  Surgeon: Zhang Diez MD;  Location: [facility];  Service: Endoscopy;  Laterality: N/A;    mass removal      TUBAL LIGATION         Family History   Problem Relation Age of Onset    Hypertension Mother     Kidney disease Mother     Diabetes Father     Stroke Father     Heart disease Sister 49    Kidney disease Sister     Stroke Sister      in 50s       Social History     Social History    Marital status:      Spouse name: N/A    Number of children: N/A    Years of education: N/A     Occupational History    Not on file.     Social History Main Topics    Smoking status: Never Smoker    Smokeless tobacco: Never Used    Alcohol use No    Drug use: Unknown    Sexual activity: Not on file     Other Topics Concern    Not on file     Social History Narrative    No narrative on file       Current Medications  Medications reviewed and updated.     Allergies   Review of patient's allergies indicates:  No Known Allergies      Labs  Lab Results   Component Value Date    HGBA1C 6.4 (H) 2017     Lab Results   Component Value Date     2017    K 4.0 2017     2017    CO2 29 2017    BUN 14 2017    CREATININE 0.8 2017    CALCIUM 9.8 2017    ANIONGAP 9 2017    ESTGFRAFRICA >60.0 2017    EGFRNONAA >60.0 2017     Lab Results   Component Value Date  "   CHOL 191 03/17/2017    CHOL 152 07/06/2015     Lab Results   Component Value Date    HDL 36 (L) 03/17/2017    HDL 34 (L) 07/06/2015     Lab Results   Component Value Date    LDLCALC 135.6 03/17/2017    LDLCALC 96.2 07/06/2015     Lab Results   Component Value Date    TRIG 97 03/17/2017    TRIG 109 07/06/2015     Lab Results   Component Value Date    CHOLHDL 18.8 (L) 03/17/2017    CHOLHDL 22.4 07/06/2015     Last set of blood work has been reviewed as noted above.    Review of Systems   Constitutional: Negative for activity change, appetite change, fatigue, fever and unexpected weight change.   HENT: Negative.  Negative for ear discharge, ear pain, rhinorrhea and sore throat.    Eyes: Negative.    Respiratory: Negative for apnea, cough, chest tightness, shortness of breath and wheezing.    Cardiovascular: Negative for chest pain, palpitations and leg swelling.   Gastrointestinal: Negative for abdominal distention, abdominal pain, constipation, diarrhea and vomiting.   Endocrine: Negative for cold intolerance, heat intolerance, polydipsia and polyuria.   Genitourinary: Negative for decreased urine volume, menstrual problem, urgency, vaginal bleeding, vaginal discharge and vaginal pain.   Musculoskeletal: Negative.    Skin: Negative for rash.   Neurological: Negative for dizziness and headaches.   Hematological: Does not bruise/bleed easily.   Psychiatric/Behavioral: Negative for agitation, sleep disturbance and suicidal ideas.       /60 (BP Location: Left arm, Patient Position: Sitting, BP Method: X-Large (Manual))   Pulse 85   Temp 98.5 °F (36.9 °C) (Oral)   Ht 5' 2" (1.575 m)   Wt (!) 152.4 kg (335 lb 15.7 oz)   LMP 05/23/2014   SpO2 95%   BMI 61.45 kg/m²      Physical Exam   Constitutional: She is oriented to person, place, and time. She appears well-developed and well-nourished.   HENT:   Head: Normocephalic.   Right Ear: External ear normal.   Left Ear: External ear normal.   Nose: Nose normal. "   Mouth/Throat: Oropharynx is clear and moist.   Eyes: Conjunctivae and EOM are normal. Pupils are equal, round, and reactive to light.   Cardiovascular: Normal rate, regular rhythm and normal heart sounds.    Pulmonary/Chest: Effort normal and breath sounds normal.   Neurological: She is alert and oriented to person, place, and time.   Skin: Skin is warm and dry.   Vitals reviewed.      Health Maintenance  Health Maintenance       Date Due Completion Date    TETANUS VACCINE 03/29/1973 ---    Influenza Vaccine 08/01/2017 3/17/2017 (Declined)    Override on 3/17/2017: Declined    Mammogram 11/02/2017 11/2/2016    Pap Smear with HPV Cotest 07/15/2018 7/15/2015    Lipid Panel 03/17/2022 3/17/2017    Colonoscopy 11/03/2026 11/3/2016    Override on 5/7/2010: Done (Dr. Roberts, [facility])          Assessment/Plan:  Hilary Wilson is a 62 y.o. female who presents today for :    1. Essential hypertension    2. Morbid obesity with BMI of 60.0-69.9, adult        Problem List Items Addressed This Visit        Unprioritized    Essential hypertension - Primary    - well controlled at this time   -  Continue with weight loss  -  Pt is currently stable on medication regimen.  Continue current therapy as scheduled.  Contact office with any questions about adjustments on medications.         Other Visit Diagnoses     Morbid obesity with BMI of 60.0-69.9, adult        Relevant Medications    diethylpropion 75 mg TbSR  - Pt is currently stable on medication regimen.  Continue current therapy as scheduled.  Contact office with any questions about adjustments on medications.             Return in about 4 weeks (around 10/26/2017).     This note was created by combination of typed  and Dragon dictation.  Transcription errors may be present.  If there are any questions, please contact me.

## 2017-10-26 ENCOUNTER — OFFICE VISIT (OUTPATIENT)
Dept: FAMILY MEDICINE | Facility: CLINIC | Age: 62
End: 2017-10-26
Payer: COMMERCIAL

## 2017-10-26 VITALS
SYSTOLIC BLOOD PRESSURE: 110 MMHG | HEART RATE: 80 BPM | OXYGEN SATURATION: 97 % | WEIGHT: 293 LBS | BODY MASS INDEX: 53.92 KG/M2 | TEMPERATURE: 99 F | DIASTOLIC BLOOD PRESSURE: 60 MMHG | HEIGHT: 62 IN

## 2017-10-26 DIAGNOSIS — R73.03 PREDIABETES: ICD-10-CM

## 2017-10-26 DIAGNOSIS — I10 ESSENTIAL HYPERTENSION: Primary | ICD-10-CM

## 2017-10-26 DIAGNOSIS — E66.01 MORBID OBESITY WITH BMI OF 60.0-69.9, ADULT: ICD-10-CM

## 2017-10-26 PROCEDURE — 99214 OFFICE O/P EST MOD 30 MIN: CPT | Mod: S$GLB,,, | Performed by: FAMILY MEDICINE

## 2017-10-26 PROCEDURE — 99999 PR PBB SHADOW E&M-EST. PATIENT-LVL III: CPT | Mod: PBBFAC,,, | Performed by: FAMILY MEDICINE

## 2017-10-26 RX ORDER — DIETHYLPROPION HYDROCHLORIDE 75 MG/1
75 TABLET, EXTENDED RELEASE ORAL DAILY
Qty: 30 TABLET | Refills: 0 | Status: SHIPPED | OUTPATIENT
Start: 2017-10-26 | End: 2017-12-04 | Stop reason: SDUPTHER

## 2017-10-26 NOTE — PROGRESS NOTES
Office Visit    Patient Name: Hilary Wilson    : 1955  MRN: [id number]    Subjective:  Hilary is a 62 y.o. female who presents today for:    Weight Check      This patient has multiple medical diagnoses as noted below.  This patient is known to me and to this clinic. She has lost 3 lbs since last evaluation.  She continues to make an effort to lose weight.  She recently lost her mother.  Her appetite has been decreased.       Patient Active Problem List   Diagnosis    Essential hypertension    Prediabetes    JOSUÉ on CPAP    BMI 60.0-69.9, adult       Past Surgical History:   Procedure Laterality Date    BREAST SURGERY      biopsy-benign    COLONOSCOPY N/A 11/3/2016    Procedure: COLONOSCOPY;  Surgeon: Zhang Diez MD;  Location: [facility];  Service: Endoscopy;  Laterality: N/A;    mass removal      TUBAL LIGATION         Family History   Problem Relation Age of Onset    Hypertension Mother     Kidney disease Mother     Diabetes Father     Stroke Father     Heart disease Sister 49    Kidney disease Sister     Stroke Sister      in 50s       Social History     Social History    Marital status:      Spouse name: N/A    Number of children: N/A    Years of education: N/A     Occupational History    Not on file.     Social History Main Topics    Smoking status: Never Smoker    Smokeless tobacco: Never Used    Alcohol use No    Drug use: Unknown    Sexual activity: Not on file     Other Topics Concern    Not on file     Social History Narrative    No narrative on file       Current Medications  Medications reviewed and updated.     Allergies   Review of patient's allergies indicates:  No Known Allergies      Labs  Lab Results   Component Value Date    HGBA1C 6.4 (H) 2017     Lab Results   Component Value Date     2017    K 4.0 2017     2017    CO2 29 2017    BUN 14 2017    CREATININE 0.8 2017    CALCIUM 9.8 2017     "ANIONGAP 9 03/17/2017    ESTGFRAFRICA >60.0 03/17/2017    EGFRNONAA >60.0 03/17/2017     Lab Results   Component Value Date    CHOL 191 03/17/2017    CHOL 152 07/06/2015     Lab Results   Component Value Date    HDL 36 (L) 03/17/2017    HDL 34 (L) 07/06/2015     Lab Results   Component Value Date    LDLCALC 135.6 03/17/2017    LDLCALC 96.2 07/06/2015     Lab Results   Component Value Date    TRIG 97 03/17/2017    TRIG 109 07/06/2015     Lab Results   Component Value Date    CHOLHDL 18.8 (L) 03/17/2017    CHOLHDL 22.4 07/06/2015     Last set of blood work has been reviewed as noted above.    Review of Systems   Constitutional: Negative for activity change, appetite change, fatigue, fever and unexpected weight change.   HENT: Negative.  Negative for ear discharge, ear pain, rhinorrhea and sore throat.    Eyes: Negative.    Respiratory: Negative for apnea, cough, chest tightness, shortness of breath and wheezing.    Cardiovascular: Negative for chest pain, palpitations and leg swelling.   Gastrointestinal: Negative for abdominal distention, abdominal pain, constipation, diarrhea and vomiting.   Endocrine: Negative for cold intolerance, heat intolerance, polydipsia and polyuria.   Genitourinary: Negative for decreased urine volume, menstrual problem, urgency, vaginal bleeding, vaginal discharge and vaginal pain.   Musculoskeletal: Negative.    Skin: Negative for rash.   Neurological: Negative for dizziness and headaches.   Hematological: Does not bruise/bleed easily.   Psychiatric/Behavioral: Negative for agitation, sleep disturbance and suicidal ideas.       /60 (BP Location: Left arm, Patient Position: Sitting, BP Method: Thigh Cuff (Manual))   Pulse 80   Temp 98.8 °F (37.1 °C) (Oral)   Ht 5' 2" (1.575 m)   Wt (!) 150.9 kg (332 lb 10.8 oz)   LMP 05/23/2014   SpO2 97%   BMI 60.85 kg/m²      Physical Exam   Constitutional: She is oriented to person, place, and time. She appears well-developed and " well-nourished.   HENT:   Head: Normocephalic.   Right Ear: External ear normal.   Left Ear: External ear normal.   Nose: Nose normal.   Mouth/Throat: Oropharynx is clear and moist.   Eyes: Conjunctivae and EOM are normal. Pupils are equal, round, and reactive to light.   Cardiovascular: Normal rate, regular rhythm and normal heart sounds.    Pulmonary/Chest: Effort normal and breath sounds normal.   Neurological: She is alert and oriented to person, place, and time.   Skin: Skin is warm and dry.   Vitals reviewed.      Health Maintenance  Health Maintenance       Date Due Completion Date    TETANUS VACCINE 03/29/1973 ---    Influenza Vaccine 08/01/2017 3/17/2017 (Declined)    Override on 3/17/2017: Declined    Mammogram 11/02/2017 11/2/2016    Pap Smear with HPV Cotest 07/15/2018 7/15/2015    Lipid Panel 03/17/2022 3/17/2017    Colonoscopy 11/03/2026 11/3/2016    Override on 5/7/2010: Done (Dr. Roberts, [facility])          Assessment/Plan:  Hilary Wilson is a 62 y.o. female who presents today for :    1. Essential hypertension    2. BMI 60.0-69.9, adult    3. Prediabetes    4. Morbid obesity with BMI of 60.0-69.9, adult        Problem List Items Addressed This Visit        Unprioritized    BMI 60.0-69.9, adult  -  The patient is asked to make an attempt to improve diet and exercise patterns to aid in medical management of this problem.      Essential hypertension - Primary  -  Pt is currently stable on medication regimen.  Continue current therapy as scheduled.  Contact office with any questions about adjustments on medications.   -  Continue with weight loss efforts     Prediabetes  -  Noted.       Other Visit Diagnoses     Morbid obesity with BMI of 60.0-69.9, adult        Relevant Medications    diethylpropion 75 mg TbSR          Return in about 4 weeks (around 11/23/2017).     This note was created by combination of typed  and Dragon dictation.  Transcription errors may be present.  If there are  any questions, please contact me.

## 2017-11-09 RX ORDER — HYDROCHLOROTHIAZIDE 25 MG/1
25 TABLET ORAL DAILY
Qty: 30 TABLET | Refills: 0 | Status: SHIPPED | OUTPATIENT
Start: 2017-11-09 | End: 2017-12-14 | Stop reason: SDUPTHER

## 2017-11-20 ENCOUNTER — TELEPHONE (OUTPATIENT)
Dept: FAMILY MEDICINE | Facility: CLINIC | Age: 62
End: 2017-11-20

## 2017-11-20 DIAGNOSIS — Z12.31 ENCOUNTER FOR SCREENING MAMMOGRAM FOR BREAST CANCER: Primary | ICD-10-CM

## 2017-11-20 NOTE — TELEPHONE ENCOUNTER
----- Message from Ignacia Eldridge sent at 11/20/2017  1:35 PM CST -----  Contact: [phone]  Pt is requesting a order for a mammogram Please call pt at your earliest convenience.  Thanks !

## 2017-12-04 ENCOUNTER — OFFICE VISIT (OUTPATIENT)
Dept: FAMILY MEDICINE | Facility: CLINIC | Age: 62
End: 2017-12-04
Payer: COMMERCIAL

## 2017-12-04 VITALS
DIASTOLIC BLOOD PRESSURE: 70 MMHG | SYSTOLIC BLOOD PRESSURE: 110 MMHG | OXYGEN SATURATION: 96 % | HEART RATE: 66 BPM | WEIGHT: 293 LBS | HEIGHT: 62 IN | BODY MASS INDEX: 53.92 KG/M2 | TEMPERATURE: 98 F

## 2017-12-04 DIAGNOSIS — J18.9 PRIMARY ATYPICAL PNEUMONIA: ICD-10-CM

## 2017-12-04 DIAGNOSIS — E66.01 MORBID OBESITY WITH BMI OF 60.0-69.9, ADULT: ICD-10-CM

## 2017-12-04 DIAGNOSIS — J40 BRONCHITIS: Primary | ICD-10-CM

## 2017-12-04 PROCEDURE — 99214 OFFICE O/P EST MOD 30 MIN: CPT | Mod: S$GLB,,, | Performed by: FAMILY MEDICINE

## 2017-12-04 PROCEDURE — 99999 PR PBB SHADOW E&M-EST. PATIENT-LVL III: CPT | Mod: PBBFAC,,, | Performed by: FAMILY MEDICINE

## 2017-12-04 RX ORDER — LEVOFLOXACIN 500 MG/1
500 TABLET, FILM COATED ORAL DAILY
Qty: 7 TABLET | Refills: 0 | Status: SHIPPED | OUTPATIENT
Start: 2017-12-04 | End: 2017-12-11

## 2017-12-04 RX ORDER — ALBUTEROL SULFATE 0.83 MG/ML
2.5 SOLUTION RESPIRATORY (INHALATION) EVERY 6 HOURS PRN
Qty: 45 ML | Refills: 2 | Status: SHIPPED | OUTPATIENT
Start: 2017-12-04 | End: 2018-12-04

## 2017-12-04 RX ORDER — DIETHYLPROPION HYDROCHLORIDE 75 MG/1
75 TABLET, EXTENDED RELEASE ORAL DAILY
Qty: 30 TABLET | Refills: 0 | Status: SHIPPED | OUTPATIENT
Start: 2017-12-04 | End: 2018-04-03 | Stop reason: SDUPTHER

## 2017-12-08 NOTE — PROGRESS NOTES
Office Visit    Patient Name: Hilary Wilson    : 1955  MRN: [id number]    Subjective:  Hilary is a 62 y.o. female who presents today for:    Weight Check      This patient has multiple medical diagnoses as noted below.  This patient is known to me and to this clinic.  She has note been eating due to recent illness.  She has been in bed.  She has decreased her activity level.  She denies any sick contacts and no recent travel.  Patient denies any new symptoms including chest pain, blurry vision, N/V, diarrhea.        Patient Active Problem List   Diagnosis    Essential hypertension    Prediabetes    JOSUÉ on CPAP    BMI 60.0-69.9, adult       Past Surgical History:   Procedure Laterality Date    BREAST SURGERY      biopsy-benign    COLONOSCOPY N/A 11/3/2016    Procedure: COLONOSCOPY;  Surgeon: Zhang Diez MD;  Location: [facility];  Service: Endoscopy;  Laterality: N/A;    mass removal      TUBAL LIGATION         Family History   Problem Relation Age of Onset    Hypertension Mother     Kidney disease Mother     Diabetes Father     Stroke Father     Heart disease Sister 49    Kidney disease Sister     Stroke Sister      in 50s       Social History     Social History    Marital status:      Spouse name: N/A    Number of children: N/A    Years of education: N/A     Occupational History    Not on file.     Social History Main Topics    Smoking status: Never Smoker    Smokeless tobacco: Never Used    Alcohol use No    Drug use: Unknown    Sexual activity: Not on file     Other Topics Concern    Not on file     Social History Narrative    No narrative on file       Current Medications  Medications reviewed and updated.     Allergies   Review of patient's allergies indicates:  No Known Allergies      Labs  Lab Results   Component Value Date    HGBA1C 6.4 (H) 2017     Lab Results   Component Value Date     2017    K 4.0 2017     2017    CO2  "29 03/17/2017    BUN 14 03/17/2017    CREATININE 0.8 03/17/2017    CALCIUM 9.8 03/17/2017    ANIONGAP 9 03/17/2017    ESTGFRAFRICA >60.0 03/17/2017    EGFRNONAA >60.0 03/17/2017     Lab Results   Component Value Date    CHOL 191 03/17/2017    CHOL 152 07/06/2015     Lab Results   Component Value Date    HDL 36 (L) 03/17/2017    HDL 34 (L) 07/06/2015     Lab Results   Component Value Date    LDLCALC 135.6 03/17/2017    LDLCALC 96.2 07/06/2015     Lab Results   Component Value Date    TRIG 97 03/17/2017    TRIG 109 07/06/2015     Lab Results   Component Value Date    CHOLHDL 18.8 (L) 03/17/2017    CHOLHDL 22.4 07/06/2015     Last set of blood work has been reviewed as noted above.    Review of Systems   Constitutional: Positive for fatigue. Negative for activity change, appetite change, fever and unexpected weight change.   HENT: Negative.  Negative for ear discharge, ear pain, rhinorrhea and sore throat.    Eyes: Negative.    Respiratory: Positive for cough, shortness of breath and wheezing. Negative for apnea and chest tightness.    Cardiovascular: Negative for chest pain, palpitations and leg swelling.   Gastrointestinal: Negative for abdominal distention, abdominal pain, constipation, diarrhea and vomiting.   Endocrine: Negative for cold intolerance, heat intolerance, polydipsia and polyuria.   Genitourinary: Negative for decreased urine volume, menstrual problem, urgency, vaginal bleeding, vaginal discharge and vaginal pain.   Musculoskeletal: Negative.    Skin: Negative for rash.   Neurological: Positive for weakness and headaches. Negative for dizziness.   Hematological: Does not bruise/bleed easily.   Psychiatric/Behavioral: Negative for agitation, sleep disturbance and suicidal ideas.       /70 (BP Location: Left arm, Patient Position: Sitting, BP Method: Thigh Cuff (Manual))   Pulse 66   Temp 97.8 °F (36.6 °C) (Oral)   Ht 5' 2" (1.575 m)   Wt (!) 152.5 kg (336 lb 3.2 oz)   LMP 05/23/2014   SpO2 " 96%   BMI 61.49 kg/m²      Physical Exam   Constitutional: She is oriented to person, place, and time. She appears well-developed and well-nourished.   HENT:   Head: Normocephalic and atraumatic.   Right Ear: External ear normal.   Left Ear: External ear normal.   Nose: Nose normal.   Mouth/Throat: Oropharynx is clear and moist.   Eyes: Conjunctivae and EOM are normal. Pupils are equal, round, and reactive to light.   Neck: Normal range of motion. No JVD present. No thyromegaly present.   Cardiovascular: Normal rate, regular rhythm and normal heart sounds.    Pulmonary/Chest: Effort normal and breath sounds normal. She has no wheezes.   Abdominal: Soft. Bowel sounds are normal. She exhibits no distension. There is no tenderness.   Musculoskeletal: Normal range of motion.   Lymphadenopathy:     She has no cervical adenopathy.   Neurological: She is alert and oriented to person, place, and time. She has normal reflexes.   Skin: Skin is warm and dry.   Psychiatric: She has a normal mood and affect. Her behavior is normal. Judgment and thought content normal.   Vitals reviewed.      Health Maintenance  Health Maintenance       Date Due Completion Date    TETANUS VACCINE 03/29/1973 ---    Influenza Vaccine 08/01/2017 3/17/2017 (Declined)    Override on 3/17/2017: Declined    Mammogram 11/02/2017 11/2/2016    Pap Smear with HPV Cotest 07/15/2018 7/15/2015    Lipid Panel 03/17/2022 3/17/2017    Colonoscopy 11/03/2026 11/3/2016    Override on 5/7/2010: Done (Dr. Roberts, [place] )          Assessment/Plan:  Hilary Wilson is a 62 y.o. female who presents today for :    1. Bronchitis    2. Primary atypical pneumonia    3. Morbid obesity with BMI of 60.0-69.9, adult        Problem List Items Addressed This Visit     None      Visit Diagnoses     Bronchitis    -  Primary    Relevant Medications    albuterol (PROVENTIL) 2.5 mg /3 mL (0.083 %) nebulizer solution  -  Go to ER if symptoms worsen or persist.       Primary  atypical pneumonia        Relevant Medications    levoFLOXacin (LEVAQUIN) 500 MG tablet  -    I have discussed the common side effects of this medication with the patient and answered all of the questions they had at the time of this visit regarding this medication.      Morbid obesity with BMI of 60.0-69.9, adult        Relevant Medications    diethylpropion 75 mg TbSR\  -  Do not take until feeling better             No Follow-up on file.     This note was created by combination of typed  and Dragon dictation.  Transcription errors may be present.  If there are any questions, please contact me.

## 2017-12-14 DIAGNOSIS — J40 BRONCHITIS: ICD-10-CM

## 2017-12-15 RX ORDER — HYDROCHLOROTHIAZIDE 25 MG/1
25 TABLET ORAL DAILY
Qty: 30 TABLET | Refills: 0 | Status: SHIPPED | OUTPATIENT
Start: 2017-12-15 | End: 2018-12-21 | Stop reason: SDUPTHER

## 2018-01-04 ENCOUNTER — OFFICE VISIT (OUTPATIENT)
Dept: FAMILY MEDICINE | Facility: CLINIC | Age: 63
End: 2018-01-04
Payer: COMMERCIAL

## 2018-01-04 ENCOUNTER — HOSPITAL ENCOUNTER (OUTPATIENT)
Dept: RADIOLOGY | Facility: HOSPITAL | Age: 63
Discharge: HOME | End: 2018-01-04
Attending: FAMILY MEDICINE
Payer: COMMERCIAL

## 2018-01-04 VITALS
DIASTOLIC BLOOD PRESSURE: 70 MMHG | OXYGEN SATURATION: 96 % | HEART RATE: 122 BPM | TEMPERATURE: 98 F | HEIGHT: 62 IN | SYSTOLIC BLOOD PRESSURE: 120 MMHG | BODY MASS INDEX: 53.92 KG/M2 | WEIGHT: 293 LBS

## 2018-01-04 DIAGNOSIS — S86.912A KNEE STRAIN, LEFT, INITIAL ENCOUNTER: Primary | ICD-10-CM

## 2018-01-04 DIAGNOSIS — Z12.31 ENCOUNTER FOR SCREENING MAMMOGRAM FOR BREAST CANCER: ICD-10-CM

## 2018-01-04 DIAGNOSIS — L02.92 RECURRENT BOILS: ICD-10-CM

## 2018-01-04 PROCEDURE — 77063 BREAST TOMOSYNTHESIS BI: CPT | Mod: 26,,, | Performed by: RADIOLOGY

## 2018-01-04 PROCEDURE — 77067 SCR MAMMO BI INCL CAD: CPT | Mod: TC,PO

## 2018-01-04 PROCEDURE — 99214 OFFICE O/P EST MOD 30 MIN: CPT | Mod: S$GLB,,, | Performed by: FAMILY MEDICINE

## 2018-01-04 PROCEDURE — 99999 PR PBB SHADOW E&M-EST. PATIENT-LVL III: CPT | Mod: PBBFAC,,, | Performed by: FAMILY MEDICINE

## 2018-01-04 PROCEDURE — 77067 SCR MAMMO BI INCL CAD: CPT | Mod: 26,,, | Performed by: RADIOLOGY

## 2018-01-04 RX ORDER — ETODOLAC 400 MG/1
400 TABLET, FILM COATED ORAL 2 TIMES DAILY
Qty: 60 TABLET | Refills: 0 | Status: SHIPPED | OUTPATIENT
Start: 2018-01-04 | End: 2018-04-03 | Stop reason: SDUPTHER

## 2018-01-04 RX ORDER — SULFAMETHOXAZOLE AND TRIMETHOPRIM 800; 160 MG/1; MG/1
1 TABLET ORAL 2 TIMES DAILY
Qty: 20 TABLET | Refills: 0 | Status: SHIPPED | OUTPATIENT
Start: 2018-01-04 | End: 2018-04-03

## 2018-01-04 NOTE — PROGRESS NOTES
Office Visit    Patient Name: Hilary Wilson    : 1955  MRN: [id number]    Subjective:  Hilary is a 62 y.o. female who presents today for:    Weight Check      This patient has multiple medical diagnoses as noted below.  This patient is known to me and to this clinic. She reports that her left knee was slipping out of place.  She has used naproxen for her pain, but that has not improved her current knee pain.      She has multiple boils underneath her pannus.  She is currently draining from one spot.  Patient denies any new symptoms including chest pain, SOB, blurry vision, N/V, diarrhea.      She does hydrate with water       Patient Active Problem List   Diagnosis    Essential hypertension    Prediabetes    JOSUÉ on CPAP    BMI 60.0-69.9, adult       Past Surgical History:   Procedure Laterality Date    BREAST BIOPSY Left     COLONOSCOPY N/A 11/3/2016    Procedure: COLONOSCOPY;  Surgeon: Zhang Diez MD;  Location: [facility];  Service: Endoscopy;  Laterality: N/A;    mass removal      TUBAL LIGATION         Family History   Problem Relation Age of Onset    Hypertension Mother     Kidney disease Mother     Diabetes Father     Stroke Father     Heart disease Sister 49    Kidney disease Sister     Stroke Sister      in 50s       Social History     Social History    Marital status:      Spouse name: N/A    Number of children: N/A    Years of education: N/A     Occupational History    Not on file.     Social History Main Topics    Smoking status: Never Smoker    Smokeless tobacco: Never Used    Alcohol use No    Drug use: Unknown    Sexual activity: Not on file     Other Topics Concern    Not on file     Social History Narrative    No narrative on file       Current Medications  Medications reviewed and updated.     Allergies   Review of patient's allergies indicates:  No Known Allergies      Labs  Lab Results   Component Value Date    HGBA1C 6.4 (H) 2017     Lab  "Results   Component Value Date     03/17/2017    K 4.0 03/17/2017     03/17/2017    CO2 29 03/17/2017    BUN 14 03/17/2017    CREATININE 0.8 03/17/2017    CALCIUM 9.8 03/17/2017    ANIONGAP 9 03/17/2017    ESTGFRAFRICA >60.0 03/17/2017    EGFRNONAA >60.0 03/17/2017     Lab Results   Component Value Date    CHOL 191 03/17/2017    CHOL 152 07/06/2015     Lab Results   Component Value Date    HDL 36 (L) 03/17/2017    HDL 34 (L) 07/06/2015     Lab Results   Component Value Date    LDLCALC 135.6 03/17/2017    LDLCALC 96.2 07/06/2015     Lab Results   Component Value Date    TRIG 97 03/17/2017    TRIG 109 07/06/2015     Lab Results   Component Value Date    CHOLHDL 18.8 (L) 03/17/2017    CHOLHDL 22.4 07/06/2015     Last set of blood work has been reviewed as noted above.    Review of Systems   Constitutional: Negative for activity change, appetite change, fatigue, fever and unexpected weight change.   HENT: Negative.  Negative for ear discharge, ear pain, rhinorrhea and sore throat.    Eyes: Negative.    Respiratory: Negative for apnea, cough, chest tightness, shortness of breath and wheezing.    Cardiovascular: Negative for chest pain, palpitations and leg swelling.   Gastrointestinal: Negative for abdominal distention, abdominal pain, constipation, diarrhea and vomiting.   Endocrine: Negative for cold intolerance, heat intolerance, polydipsia and polyuria.   Genitourinary: Negative for decreased urine volume, menstrual problem, urgency, vaginal bleeding, vaginal discharge and vaginal pain.   Musculoskeletal: Negative.    Skin: Negative for rash.   Neurological: Negative for dizziness and headaches.   Hematological: Does not bruise/bleed easily.   Psychiatric/Behavioral: Negative for agitation, sleep disturbance and suicidal ideas.       /70 (BP Location: Left arm, Patient Position: Sitting, BP Method: Thigh Cuff (Manual))   Pulse (!) 122   Temp 98.4 °F (36.9 °C) (Oral)   Ht 5' 2" (1.575 m)   Wt (!) " 155 kg (341 lb 11.4 oz)   LMP 05/23/2014   SpO2 96%   BMI 62.50 kg/m²      Physical Exam   Constitutional: She is oriented to person, place, and time. She appears well-developed and well-nourished.   HENT:   Head: Normocephalic.   Right Ear: External ear normal.   Left Ear: External ear normal.   Nose: Nose normal.   Mouth/Throat: Oropharynx is clear and moist.   Eyes: Conjunctivae and EOM are normal. Pupils are equal, round, and reactive to light.   Cardiovascular: Normal rate, regular rhythm and normal heart sounds.    Pulmonary/Chest: Effort normal and breath sounds normal.   Neurological: She is alert and oriented to person, place, and time.   Skin: Skin is warm and dry.   Vitals reviewed.      Health Maintenance  Health Maintenance       Date Due Completion Date    TETANUS VACCINE 03/29/1973 ---    Influenza Vaccine 08/01/2017 3/17/2017 (Declined)    Override on 3/17/2017: Declined    Mammogram 11/02/2017 11/2/2016    Pap Smear with HPV Cotest 07/15/2018 7/15/2015    Lipid Panel 03/17/2022 3/17/2017    Colonoscopy 11/03/2026 11/3/2016    Override on 5/7/2010: Done (Dr. Roberts, [facility])          Assessment/Plan:  Hilary Wilson is a 62 y.o. female who presents today for :    1. Knee strain, left, initial encounter    2. Recurrent boils    3. BMI 60.0-69.9, adult        Problem List Items Addressed This Visit        Unprioritized    BMI 60.0-69.9, adult  -   Continue with diethyproprion   -  Diets need to be more consistent         Other Visit Diagnoses     Knee strain, left, initial encounter    -  Primary  -   Ice and heat on the area   -  Consider referral to ortho       Recurrent boils        Relevant Medications    sulfamethoxazole-trimethoprim 800-160mg (BACTRIM DS) 800-160 mg Tab  -    I have discussed the common side effects of this medication with the patient and answered all of the questions they had at the time of this visit regarding this medication.            Return in about 4 weeks (around  2/1/2018).     This note was created by combination of typed  and Dragon dictation.  Transcription errors may be present.  If there are any questions, please contact me.

## 2018-01-04 NOTE — PROGRESS NOTES
Office Visit    Patient Name: Hilary Wilson    : 1955  MRN: [id number]    Subjective:  Hilary is a 62 y.o. female who presents today for:    Weight Check      This patient has multiple medical diagnoses as noted below.  This patient is known to me and to this clinic.       Patient Active Problem List   Diagnosis    Essential hypertension    Prediabetes    JOSUÉ on CPAP    BMI 60.0-69.9, adult       Past Surgical History:   Procedure Laterality Date    BREAST SURGERY      biopsy-benign    COLONOSCOPY N/A 11/3/2016    Procedure: COLONOSCOPY;  Surgeon: Zhang Diez MD;  Location: [facility];  Service: Endoscopy;  Laterality: N/A;    mass removal      TUBAL LIGATION         Family History   Problem Relation Age of Onset    Hypertension Mother     Kidney disease Mother     Diabetes Father     Stroke Father     Heart disease Sister 49    Kidney disease Sister     Stroke Sister      in 50s       Social History     Social History    Marital status:      Spouse name: N/A    Number of children: N/A    Years of education: N/A     Occupational History    Not on file.     Social History Main Topics    Smoking status: Never Smoker    Smokeless tobacco: Never Used    Alcohol use No    Drug use: Unknown    Sexual activity: Not on file     Other Topics Concern    Not on file     Social History Narrative    No narrative on file       Current Medications  Medications reviewed and updated.     Allergies   Review of patient's allergies indicates:  No Known Allergies      Labs  Lab Results   Component Value Date    HGBA1C 6.4 (H) 2017     Lab Results   Component Value Date     2017    K 4.0 2017     2017    CO2 29 2017    BUN 14 2017    CREATININE 0.8 2017    CALCIUM 9.8 2017    ANIONGAP 9 2017    ESTGFRAFRICA >60.0 2017    EGFRNONAA >60.0 2017     Lab Results   Component Value Date    CHOL 191 2017    CHOL  "152 07/06/2015     Lab Results   Component Value Date    HDL 36 (L) 03/17/2017    HDL 34 (L) 07/06/2015     Lab Results   Component Value Date    LDLCALC 135.6 03/17/2017    LDLCALC 96.2 07/06/2015     Lab Results   Component Value Date    TRIG 97 03/17/2017    TRIG 109 07/06/2015     Lab Results   Component Value Date    CHOLHDL 18.8 (L) 03/17/2017    CHOLHDL 22.4 07/06/2015     Last set of blood work has been reviewed as noted above.    Review of Systems    /70 (BP Location: Left arm, Patient Position: Sitting, BP Method: Thigh Cuff (Manual))   Pulse (!) 122   Temp 98.4 °F (36.9 °C) (Oral)   Ht 5' 2" (1.575 m)   Wt (!) 155 kg (341 lb 11.4 oz)   LMP 05/23/2014   SpO2 96%   BMI 62.50 kg/m²      Physical Exam    Health Maintenance  Health Maintenance       Date Due Completion Date    TETANUS VACCINE 03/29/1973 ---    Influenza Vaccine 08/01/2017 3/17/2017 (Declined)    Override on 3/17/2017: Declined    Mammogram 11/02/2017 11/2/2016    Pap Smear with HPV Cotest 07/15/2018 7/15/2015    Lipid Panel 03/17/2022 3/17/2017    Colonoscopy 11/03/2026 11/3/2016    Override on 5/7/2010: Done (Dr. Roberts, [facility])          Assessment/Plan:  Hilary Wilson is a 62 y.o. female who presents today for :    No diagnosis found.    Problem List Items Addressed This Visit     None          No Follow-up on file.     This note was created by combination of typed  and Dragon dictation.  Transcription errors may be present.  If there are any questions, please contact me.  "

## 2018-04-03 ENCOUNTER — OFFICE VISIT (OUTPATIENT)
Dept: FAMILY MEDICINE | Facility: CLINIC | Age: 63
End: 2018-04-03
Payer: COMMERCIAL

## 2018-04-03 VITALS
OXYGEN SATURATION: 95 % | TEMPERATURE: 99 F | HEART RATE: 100 BPM | BODY MASS INDEX: 53.92 KG/M2 | WEIGHT: 293 LBS | SYSTOLIC BLOOD PRESSURE: 114 MMHG | DIASTOLIC BLOOD PRESSURE: 70 MMHG | HEIGHT: 62 IN

## 2018-04-03 DIAGNOSIS — I10 ESSENTIAL HYPERTENSION: ICD-10-CM

## 2018-04-03 DIAGNOSIS — R73.03 PREDIABETES: Primary | ICD-10-CM

## 2018-04-03 DIAGNOSIS — E66.01 MORBID OBESITY WITH BMI OF 60.0-69.9, ADULT: ICD-10-CM

## 2018-04-03 DIAGNOSIS — M17.10 PRIMARY OSTEOARTHRITIS OF KNEE, UNSPECIFIED LATERALITY: ICD-10-CM

## 2018-04-03 PROCEDURE — 99214 OFFICE O/P EST MOD 30 MIN: CPT | Mod: S$GLB,,, | Performed by: FAMILY MEDICINE

## 2018-04-03 PROCEDURE — 3074F SYST BP LT 130 MM HG: CPT | Mod: CPTII,S$GLB,, | Performed by: FAMILY MEDICINE

## 2018-04-03 PROCEDURE — 3078F DIAST BP <80 MM HG: CPT | Mod: CPTII,S$GLB,, | Performed by: FAMILY MEDICINE

## 2018-04-03 PROCEDURE — 99999 PR PBB SHADOW E&M-EST. PATIENT-LVL III: CPT | Mod: PBBFAC,,, | Performed by: FAMILY MEDICINE

## 2018-04-03 RX ORDER — DIETHYLPROPION HYDROCHLORIDE 75 MG/1
75 TABLET, EXTENDED RELEASE ORAL DAILY
Qty: 30 TABLET | Refills: 0 | Status: SHIPPED | OUTPATIENT
Start: 2018-04-03 | End: 2018-04-03

## 2018-04-03 RX ORDER — ETODOLAC 400 MG/1
400 TABLET, FILM COATED ORAL 2 TIMES DAILY
Qty: 60 TABLET | Refills: 0 | Status: SHIPPED | OUTPATIENT
Start: 2018-04-03 | End: 2019-03-01 | Stop reason: CLARIF

## 2018-04-03 RX ORDER — PHENTERMINE HYDROCHLORIDE 15 MG/1
15 CAPSULE ORAL EVERY MORNING
Qty: 30 CAPSULE | Refills: 0 | Status: SHIPPED | OUTPATIENT
Start: 2018-04-03 | End: 2018-05-03

## 2018-04-03 NOTE — PROGRESS NOTES
Office Visit    Patient Name: Hilary Wilson    : 1955  MRN: [id number]    Subjective:  Hilary is a 63 y.o. female who presents today for:    Weight Check      This patient has multiple medical diagnoses as noted below.  This patient is known to me and to this clinic. Patient denies any new symptoms including chest pain, SOB, blurry vision, N/V, diarrhea.  She has increased knee pain.  lodine does help with the pain.  She reports pain does not occur everyday.  She has been out of her medication which has cause increased weight gain.        Patient Active Problem List   Diagnosis    Essential hypertension    Prediabetes    JOSUÉ on CPAP    BMI 60.0-69.9, adult       Past Surgical History:   Procedure Laterality Date    BREAST BIOPSY Left     COLONOSCOPY N/A 11/3/2016    Procedure: COLONOSCOPY;  Surgeon: Zhang Diez MD;  Location: [facility];  Service: Endoscopy;  Laterality: N/A;    mass removal      TUBAL LIGATION         Family History   Problem Relation Age of Onset    Hypertension Mother     Kidney disease Mother     Diabetes Father     Stroke Father     Heart disease Sister 49    Kidney disease Sister     Stroke Sister      in 50s       Social History     Social History    Marital status:      Spouse name: N/A    Number of children: N/A    Years of education: N/A     Occupational History    Not on file.     Social History Main Topics    Smoking status: Never Smoker    Smokeless tobacco: Never Used    Alcohol use No    Drug use: Unknown    Sexual activity: Not on file     Other Topics Concern    Not on file     Social History Narrative    No narrative on file       Current Medications  Medications reviewed and updated.     Allergies   Review of patient's allergies indicates:  No Known Allergies      Labs  Lab Results   Component Value Date    HGBA1C 6.4 (H) 2017     Lab Results   Component Value Date     2017    K 4.0 2017      "03/17/2017    CO2 29 03/17/2017    BUN 14 03/17/2017    CREATININE 0.8 03/17/2017    CALCIUM 9.8 03/17/2017    ANIONGAP 9 03/17/2017    ESTGFRAFRICA >60.0 03/17/2017    EGFRNONAA >60.0 03/17/2017     Lab Results   Component Value Date    CHOL 191 03/17/2017    CHOL 152 07/06/2015     Lab Results   Component Value Date    HDL 36 (L) 03/17/2017    HDL 34 (L) 07/06/2015     Lab Results   Component Value Date    LDLCALC 135.6 03/17/2017    LDLCALC 96.2 07/06/2015     Lab Results   Component Value Date    TRIG 97 03/17/2017    TRIG 109 07/06/2015     Lab Results   Component Value Date    CHOLHDL 18.8 (L) 03/17/2017    CHOLHDL 22.4 07/06/2015     Last set of blood work has been reviewed as noted above.    Review of Systems   Constitutional: Negative for activity change, appetite change, fatigue, fever and unexpected weight change.   HENT: Negative.  Negative for ear discharge, ear pain, rhinorrhea and sore throat.    Eyes: Negative.    Respiratory: Negative for apnea, cough, chest tightness, shortness of breath and wheezing.    Cardiovascular: Negative for chest pain, palpitations and leg swelling.   Gastrointestinal: Negative for abdominal distention, abdominal pain, constipation, diarrhea and vomiting.   Endocrine: Negative for cold intolerance, heat intolerance, polydipsia and polyuria.   Genitourinary: Negative for decreased urine volume, menstrual problem, urgency, vaginal bleeding, vaginal discharge and vaginal pain.   Musculoskeletal: Positive for arthralgias and joint swelling.   Skin: Negative for rash.   Neurological: Negative for dizziness and headaches.   Hematological: Does not bruise/bleed easily.   Psychiatric/Behavioral: Negative for agitation, sleep disturbance and suicidal ideas.       /70 (BP Location: Left arm, Patient Position: Sitting, BP Method: Large (Manual))   Pulse 100   Temp 98.7 °F (37.1 °C) (Oral)   Ht 5' 2" (1.575 m)   Wt (!) 156.2 kg (344 lb 5.7 oz)   LMP 05/23/2014   SpO2 95%  "  BMI 62.98 kg/m²      Physical Exam   Constitutional: She is oriented to person, place, and time. She appears well-developed and well-nourished.   HENT:   Head: Normocephalic.   Right Ear: External ear normal.   Left Ear: External ear normal.   Nose: Nose normal.   Mouth/Throat: Oropharynx is clear and moist.   Eyes: Conjunctivae and EOM are normal. Pupils are equal, round, and reactive to light.   Cardiovascular: Normal rate, regular rhythm and normal heart sounds.    Pulmonary/Chest: Effort normal and breath sounds normal.   Neurological: She is alert and oriented to person, place, and time.   Skin: Skin is warm and dry.   Vitals reviewed.      Health Maintenance  Health Maintenance       Date Due Completion Date    TETANUS VACCINE 03/29/1973 ---    Influenza Vaccine 08/01/2017 3/17/2017 (Declined)    Override on 3/17/2017: Declined    Pap Smear with HPV Cotest 07/15/2018 7/15/2015    Mammogram 01/04/2019 1/4/2018    Lipid Panel 03/17/2022 3/17/2017    Colonoscopy 11/03/2026 11/3/2016    Override on 5/7/2010: Done (Dr. Roberts, [facility])          Assessment/Plan:  Hilary Wilson is a 63 y.o. female who presents today for :    1. Prediabetes    2. Essential hypertension    3. Morbid obesity with BMI of 60.0-69.9, adult    4. Primary osteoarthritis of knee, unspecified laterality        Problem List Items Addressed This Visit        Unprioritized    Essential hypertension  -   Pt is currently stable on medication regimen.  Continue current therapy as scheduled.  Contact office with any questions about adjustments on medications.       Prediabetes - Primary    Current Assessment & Plan     Working on weight less to improve control.            Other Visit Diagnoses     Morbid obesity with BMI of 60.0-69.9, adult        Relevant Medications    phentermine 15 MG capsule    Primary osteoarthritis of knee, unspecified laterality        Relevant Medications    etodolac (LODINE) 400 MG tablet          Follow-up in  about 4 weeks (around 5/1/2018).     This note was created by combination of typed  and Dragon dictation.  Transcription errors may be present.  If there are any questions, please contact me.

## 2018-05-07 RX ORDER — ALBUTEROL SULFATE 90 UG/1
AEROSOL, METERED RESPIRATORY (INHALATION)
Qty: 24 INHALER | Refills: 1 | Status: SHIPPED | OUTPATIENT
Start: 2018-05-07 | End: 2018-06-11 | Stop reason: SDUPTHER

## 2018-05-07 RX ORDER — HYDROCHLOROTHIAZIDE 25 MG/1
TABLET ORAL
Qty: 90 TABLET | Refills: 1 | Status: SHIPPED | OUTPATIENT
Start: 2018-05-07 | End: 2018-06-11 | Stop reason: SDUPTHER

## 2018-06-11 ENCOUNTER — OFFICE VISIT (OUTPATIENT)
Dept: FAMILY MEDICINE | Facility: CLINIC | Age: 63
End: 2018-06-11
Payer: COMMERCIAL

## 2018-06-11 VITALS
TEMPERATURE: 99 F | HEART RATE: 104 BPM | OXYGEN SATURATION: 97 % | SYSTOLIC BLOOD PRESSURE: 136 MMHG | DIASTOLIC BLOOD PRESSURE: 80 MMHG | BODY MASS INDEX: 53.92 KG/M2 | WEIGHT: 293 LBS | HEIGHT: 62 IN

## 2018-06-11 DIAGNOSIS — M54.32 SCIATICA OF LEFT SIDE: ICD-10-CM

## 2018-06-11 DIAGNOSIS — L02.91 ABSCESS: Primary | ICD-10-CM

## 2018-06-11 PROCEDURE — 99999 PR PBB SHADOW E&M-EST. PATIENT-LVL III: CPT | Mod: PBBFAC,,, | Performed by: FAMILY MEDICINE

## 2018-06-11 PROCEDURE — 96372 THER/PROPH/DIAG INJ SC/IM: CPT | Mod: S$GLB,,, | Performed by: FAMILY MEDICINE

## 2018-06-11 PROCEDURE — 3079F DIAST BP 80-89 MM HG: CPT | Mod: CPTII,S$GLB,, | Performed by: FAMILY MEDICINE

## 2018-06-11 PROCEDURE — 3008F BODY MASS INDEX DOCD: CPT | Mod: CPTII,S$GLB,, | Performed by: FAMILY MEDICINE

## 2018-06-11 PROCEDURE — 3075F SYST BP GE 130 - 139MM HG: CPT | Mod: CPTII,S$GLB,, | Performed by: FAMILY MEDICINE

## 2018-06-11 PROCEDURE — 99214 OFFICE O/P EST MOD 30 MIN: CPT | Mod: 25,S$GLB,, | Performed by: FAMILY MEDICINE

## 2018-06-11 RX ORDER — PHENTERMINE HYDROCHLORIDE 15 MG/1
15 CAPSULE ORAL EVERY MORNING
Qty: 30 CAPSULE | Refills: 0 | Status: SHIPPED | OUTPATIENT
Start: 2018-06-11 | End: 2018-07-11

## 2018-06-11 RX ORDER — IBUPROFEN 800 MG/1
800 TABLET ORAL 3 TIMES DAILY
Qty: 30 TABLET | Refills: 0 | Status: ON HOLD | OUTPATIENT
Start: 2018-06-11 | End: 2019-01-25 | Stop reason: HOSPADM

## 2018-06-11 RX ORDER — KETOROLAC TROMETHAMINE 30 MG/ML
30 INJECTION, SOLUTION INTRAMUSCULAR; INTRAVENOUS ONCE
Status: COMPLETED | OUTPATIENT
Start: 2018-06-11 | End: 2018-06-11

## 2018-06-11 RX ORDER — TRAMADOL HYDROCHLORIDE 50 MG/1
50 TABLET ORAL EVERY 6 HOURS PRN
Qty: 20 TABLET | Refills: 0 | Status: SHIPPED | OUTPATIENT
Start: 2018-06-11 | End: 2018-06-21

## 2018-06-11 RX ORDER — PHENTERMINE HYDROCHLORIDE 15 MG/1
15 CAPSULE ORAL EVERY MORNING
Qty: 30 CAPSULE | Refills: 0 | Status: SHIPPED | OUTPATIENT
Start: 2018-06-11 | End: 2018-06-11 | Stop reason: SDUPTHER

## 2018-06-11 RX ORDER — AMOXICILLIN 500 MG/1
500 TABLET, FILM COATED ORAL EVERY 12 HOURS
Qty: 20 TABLET | Refills: 0 | Status: SHIPPED | OUTPATIENT
Start: 2018-06-11 | End: 2018-06-21

## 2018-06-11 RX ADMIN — KETOROLAC TROMETHAMINE 30 MG: 30 INJECTION, SOLUTION INTRAMUSCULAR; INTRAVENOUS at 03:06

## 2018-06-11 NOTE — PROGRESS NOTES
Assessment & Plan  Problem List Items Addressed This Visit        Unprioritized    BMI 60.0-69.9, adult    Relevant Medications    phentermine 15 MG capsule      Other Visit Diagnoses     Abscess    -  Primary    Relevant Medications    amoxicillin (AMOXIL) 500 MG Tab    Sciatica of left side        Relevant Medications    ibuprofen (ADVIL,MOTRIN) 800 MG tablet    traMADol (ULTRAM) 50 mg tablet    ketorolac injection 30 mg (Start on 6/11/2018  4:15 PM)            Health Maintenance reviewed.    Follow-up: No Follow-up on file.    ______________________________________________________________________    Chief Complaint  Chief Complaint   Patient presents with    Sciatica     on left side of hip    Cyst     around private area    Weight Check       HPI  Hilary Wilson is a 63 y.o. female with multiple medical diagnoses as listed in the medical history and problem list that presents for boil developed on Thursday.  Pt is known to me with last appointment 4/3/2018.      She has used triple antibiotic ointment.  Patient denies any new symptoms including chest pain, SOB, blurry vision, N/V, diarrhea.  She reports increased drainage with increased pain.      She reports increased sciatic pain on the left side of the hip.  She has previously been hospitalized for the pain.  She has used ibuprofen in the past for her pain. This did improve her pain.      She has lost 3 lbs since the last time I saw her in the office.  She is eating 3 meals a day.  She has been successful this past 2 months.      PAST MEDICAL HISTORY:  Past Medical History:   Diagnosis Date    Asthma     Hyperlipidemia     Hypertension     JOSUÉ on CPAP        PAST SURGICAL HISTORY:  Past Surgical History:   Procedure Laterality Date    BREAST BIOPSY Left     COLONOSCOPY N/A 11/3/2016    Procedure: COLONOSCOPY;  Surgeon: Zhang Diez MD;  Location: [facility];  Service: Endoscopy;  Laterality: N/A;    mass removal      TUBAL LIGATION          SOCIAL HISTORY:  Social History     Social History    Marital status:      Spouse name: N/A    Number of children: N/A    Years of education: N/A     Occupational History    Not on file.     Social History Main Topics    Smoking status: Never Smoker    Smokeless tobacco: Never Used    Alcohol use No    Drug use: Unknown    Sexual activity: Not on file     Other Topics Concern    Not on file     Social History Narrative    No narrative on file       FAMILY HISTORY:  Family History   Problem Relation Age of Onset    Hypertension Mother     Kidney disease Mother     Diabetes Father     Stroke Father     Heart disease Sister 49    Kidney disease Sister     Stroke Sister         in 50s       ALLERGIES AND MEDICATIONS: updated and reviewed.  Review of patient's allergies indicates:  No Known Allergies  Current Outpatient Prescriptions   Medication Sig Dispense Refill    albuterol (PROAIR HFA) 90 mcg/actuation inhaler Inhale 2 puffs into the lungs every 6 (six) hours as needed for Wheezing. 18 g 2    albuterol (PROVENTIL) 2.5 mg /3 mL (0.083 %) nebulizer solution Take 3 mLs (2.5 mg total) by nebulization every 6 (six) hours as needed for Wheezing. Rescue 45 mL 2    hydroCHLOROthiazide (HYDRODIURIL) 25 MG tablet Take 1 tablet (25 mg total) by mouth once daily. 30 tablet 0    amoxicillin (AMOXIL) 500 MG Tab Take 1 tablet (500 mg total) by mouth every 12 (twelve) hours. 20 tablet 0    etodolac (LODINE) 400 MG tablet Take 1 tablet (400 mg total) by mouth 2 (two) times daily. 60 tablet 0    ibuprofen (ADVIL,MOTRIN) 800 MG tablet Take 1 tablet (800 mg total) by mouth 3 (three) times daily. 30 tablet 0    mupirocin (BACTROBAN) 2 % ointment Apply topically 3 (three) times daily. 30 g 0    phentermine 15 MG capsule Take 1 capsule (15 mg total) by mouth every morning. 30 capsule 0    simvastatin (ZOCOR) 20 MG tablet Take 1 tablet (20 mg total) by mouth every evening. 90 tablet 0    traMADol  "(ULTRAM) 50 mg tablet Take 1 tablet (50 mg total) by mouth every 6 (six) hours as needed. 20 tablet 0     Current Facility-Administered Medications   Medication Dose Route Frequency Provider Last Rate Last Dose    ketorolac injection 30 mg  30 mg Intramuscular Once Ene Dorantes MD             ROS  Review of Systems   Constitutional: Negative for activity change, appetite change, fatigue, fever and unexpected weight change.   HENT: Negative.  Negative for ear discharge, ear pain, rhinorrhea and sore throat.    Eyes: Negative.    Respiratory: Negative for apnea, cough, chest tightness, shortness of breath and wheezing.    Cardiovascular: Negative for chest pain, palpitations and leg swelling.   Gastrointestinal: Negative for abdominal distention, abdominal pain, constipation, diarrhea and vomiting.   Endocrine: Negative for cold intolerance, heat intolerance, polydipsia and polyuria.   Genitourinary: Negative for decreased urine volume, menstrual problem, urgency, vaginal bleeding, vaginal discharge and vaginal pain.   Musculoskeletal: Negative.    Skin: Negative for rash.   Neurological: Negative for dizziness and headaches.   Hematological: Does not bruise/bleed easily.   Psychiatric/Behavioral: Negative for agitation, sleep disturbance and suicidal ideas.           Physical Exam  Vitals:    06/11/18 1450   BP: 136/80   BP Location: Left arm   Patient Position: Sitting   BP Method: X-Large (Manual)   Pulse: 104   Temp: 98.7 °F (37.1 °C)   TempSrc: Oral   SpO2: 97%   Weight: (!) 155.1 kg (341 lb 14.9 oz)   Height: 5' 2" (1.575 m)    Body mass index is 62.54 kg/m².  Weight: (!) 155.1 kg (341 lb 14.9 oz)   Height: 5' 2" (157.5 cm)   Physical Exam   Constitutional: She is oriented to person, place, and time. She appears well-developed and well-nourished.   HENT:   Head: Normocephalic.   Right Ear: External ear normal.   Left Ear: External ear normal.   Nose: Nose normal.   Mouth/Throat: Oropharynx is clear and " moist.   Eyes: Conjunctivae and EOM are normal. Pupils are equal, round, and reactive to light.   Cardiovascular: Normal rate, regular rhythm and normal heart sounds.    Pulmonary/Chest: Effort normal and breath sounds normal.   Neurological: She is alert and oriented to person, place, and time.   Skin: Skin is warm and dry.   Vitals reviewed.        Health Maintenance       Date Due Completion Date    TETANUS VACCINE 03/29/1973 ---    Pap Smear with HPV Cotest 07/15/2018 7/15/2015    Influenza Vaccine 08/01/2018 3/17/2017 (Declined)    Override on 3/17/2017: Declined    Mammogram 01/04/2019 1/4/2018    Lipid Panel 03/17/2022 3/17/2017    Colonoscopy 11/03/2026 11/3/2016    Override on 5/7/2010: Done (Dr. Roberts, Elena DORADO)

## 2018-07-17 ENCOUNTER — OFFICE VISIT (OUTPATIENT)
Dept: FAMILY MEDICINE | Facility: CLINIC | Age: 63
End: 2018-07-17
Payer: COMMERCIAL

## 2018-07-17 VITALS
DIASTOLIC BLOOD PRESSURE: 80 MMHG | OXYGEN SATURATION: 96 % | HEART RATE: 98 BPM | BODY MASS INDEX: 53.92 KG/M2 | WEIGHT: 293 LBS | TEMPERATURE: 99 F | HEIGHT: 62 IN | SYSTOLIC BLOOD PRESSURE: 114 MMHG

## 2018-07-17 DIAGNOSIS — R73.03 PREDIABETES: ICD-10-CM

## 2018-07-17 DIAGNOSIS — I10 ESSENTIAL HYPERTENSION: ICD-10-CM

## 2018-07-17 DIAGNOSIS — L02.91 ABSCESS: Primary | ICD-10-CM

## 2018-07-17 PROCEDURE — 99214 OFFICE O/P EST MOD 30 MIN: CPT | Mod: S$GLB,,, | Performed by: FAMILY MEDICINE

## 2018-07-17 PROCEDURE — 3079F DIAST BP 80-89 MM HG: CPT | Mod: CPTII,S$GLB,, | Performed by: FAMILY MEDICINE

## 2018-07-17 PROCEDURE — 99999 PR PBB SHADOW E&M-EST. PATIENT-LVL III: CPT | Mod: PBBFAC,,, | Performed by: FAMILY MEDICINE

## 2018-07-17 PROCEDURE — 3008F BODY MASS INDEX DOCD: CPT | Mod: CPTII,S$GLB,, | Performed by: FAMILY MEDICINE

## 2018-07-17 PROCEDURE — 3074F SYST BP LT 130 MM HG: CPT | Mod: CPTII,S$GLB,, | Performed by: FAMILY MEDICINE

## 2018-07-17 RX ORDER — AMOXICILLIN 500 MG/1
500 TABLET, FILM COATED ORAL EVERY 12 HOURS
Qty: 20 TABLET | Refills: 0 | Status: SHIPPED | OUTPATIENT
Start: 2018-07-17 | End: 2018-07-27

## 2018-07-17 RX ORDER — PHENTERMINE HYDROCHLORIDE 15 MG/1
15 CAPSULE ORAL EVERY MORNING
Qty: 30 CAPSULE | Refills: 0 | Status: SHIPPED | OUTPATIENT
Start: 2018-07-17 | End: 2018-08-16 | Stop reason: SDUPTHER

## 2018-07-19 NOTE — PROGRESS NOTES
Assessment & Plan  Problem List Items Addressed This Visit        Unprioritized    BMI 60.0-69.9, adult    Relevant Medications    phentermine 15 MG capsule    Essential hypertension    Relevant Orders    CBC auto differential    Comprehensive metabolic panel    Hemoglobin A1c    TSH    Prediabetes    Relevant Orders    CBC auto differential    Comprehensive metabolic panel    Hemoglobin A1c    TSH      Other Visit Diagnoses     Abscess    -  Primary    Relevant Medications    amoxicillin (AMOXIL) 500 MG Tab            Health Maintenance reviewed    Follow-up: No Follow-up on file.    ______________________________________________________________________    Chief Complaint  Chief Complaint   Patient presents with    Hypertension    Diabetes    Medication Refill       HPI  Hilary Wilson is a 63 y.o. female with multiple medical diagnoses as listed in the medical history and problem list that presents for diabetes and hypertension.  She would also like to discuss weight loss..  Pt is known to me with last appointment 6/11/2018.      Patient reports that she developed a new abscess in the groin region.  She reports that the previous abscess that he will.  Patient does state that she has been doing well with weight loss.  She is currently 337 lb.  She has lost 4 lb since last evaluated.  She has monitored her diet.  She is doing well with weight loss at this time.  She states that her blood pressure is doing well.  She denies any chest pain, shortness of breath, dizziness, blurry vision.  She does take her medication on a regular basis.  She denies any complications from her current medication regimen.      PAST MEDICAL HISTORY:  Past Medical History:   Diagnosis Date    Asthma     Hyperlipidemia     Hypertension     JOSUÉ on CPAP        PAST SURGICAL HISTORY:  Past Surgical History:   Procedure Laterality Date    BREAST BIOPSY Left     COLONOSCOPY N/A 11/3/2016    Procedure: COLONOSCOPY;  Surgeon: Zhang  TENISHA Diez MD;  Location: [facility];  Service: Endoscopy;  Laterality: N/A;    mass removal      TUBAL LIGATION         SOCIAL HISTORY:  Social History     Social History    Marital status:      Spouse name: N/A    Number of children: N/A    Years of education: N/A     Occupational History    Not on file.     Social History Main Topics    Smoking status: Never Smoker    Smokeless tobacco: Never Used    Alcohol use No    Drug use: Unknown    Sexual activity: Not on file     Other Topics Concern    Not on file     Social History Narrative    No narrative on file       FAMILY HISTORY:  Family History   Problem Relation Age of Onset    Hypertension Mother     Kidney disease Mother     Diabetes Father     Stroke Father     Heart disease Sister 49    Kidney disease Sister     Stroke Sister         in 50s       ALLERGIES AND MEDICATIONS: updated and reviewed.  Review of patient's allergies indicates:  No Known Allergies  Current Outpatient Prescriptions   Medication Sig Dispense Refill    albuterol (PROAIR HFA) 90 mcg/actuation inhaler Inhale 2 puffs into the lungs every 6 (six) hours as needed for Wheezing. 18 g 2    albuterol (PROVENTIL) 2.5 mg /3 mL (0.083 %) nebulizer solution Take 3 mLs (2.5 mg total) by nebulization every 6 (six) hours as needed for Wheezing. Rescue 45 mL 2    etodolac (LODINE) 400 MG tablet Take 1 tablet (400 mg total) by mouth 2 (two) times daily. 60 tablet 0    hydroCHLOROthiazide (HYDRODIURIL) 25 MG tablet Take 1 tablet (25 mg total) by mouth once daily. 30 tablet 0    ibuprofen (ADVIL,MOTRIN) 800 MG tablet Take 1 tablet (800 mg total) by mouth 3 (three) times daily. 30 tablet 0    simvastatin (ZOCOR) 20 MG tablet Take 1 tablet (20 mg total) by mouth every evening. 90 tablet 0    amoxicillin (AMOXIL) 500 MG Tab Take 1 tablet (500 mg total) by mouth every 12 (twelve) hours. for 10 days 20 tablet 0    mupirocin (BACTROBAN) 2 % ointment Apply topically 3 (three)  "times daily. 30 g 0    phentermine 15 MG capsule Take 1 capsule (15 mg total) by mouth every morning. 30 capsule 0     No current facility-administered medications for this visit.          ROS  Review of Systems   Constitutional: Negative for activity change, appetite change, fatigue, fever and unexpected weight change.   HENT: Negative.  Negative for ear discharge, ear pain, rhinorrhea and sore throat.    Eyes: Negative.    Respiratory: Negative for apnea, cough, chest tightness, shortness of breath and wheezing.    Cardiovascular: Negative for chest pain, palpitations and leg swelling.   Gastrointestinal: Negative for abdominal distention, abdominal pain, constipation, diarrhea and vomiting.   Endocrine: Negative for cold intolerance, heat intolerance, polydipsia and polyuria.   Genitourinary: Negative for decreased urine volume, menstrual problem, urgency, vaginal bleeding, vaginal discharge and vaginal pain.   Musculoskeletal: Negative.    Skin: Negative for rash.   Neurological: Negative for dizziness and headaches.   Hematological: Does not bruise/bleed easily.   Psychiatric/Behavioral: Negative for agitation, sleep disturbance and suicidal ideas.           Physical Exam  Vitals:    07/17/18 1441   BP: 114/80   BP Location: Right arm   Patient Position: Sitting   BP Method: Thigh Cuff (Manual)   Pulse: 98   Temp: 98.6 °F (37 °C)   TempSrc: Oral   SpO2: 96%   Weight: (!) 153.3 kg (337 lb 15.4 oz)   Height: 5' 2" (1.575 m)    Body mass index is 61.81 kg/m².  Weight: (!) 153.3 kg (337 lb 15.4 oz)   Height: 5' 2" (157.5 cm)   Physical Exam   Constitutional: She is oriented to person, place, and time. She appears well-developed and well-nourished.   HENT:   Head: Normocephalic.   Right Ear: External ear normal.   Left Ear: External ear normal.   Nose: Nose normal.   Mouth/Throat: Oropharynx is clear and moist.   Eyes: Conjunctivae and EOM are normal. Pupils are equal, round, and reactive to light. "   Cardiovascular: Normal rate, regular rhythm and normal heart sounds.    Pulmonary/Chest: Effort normal and breath sounds normal.   Neurological: She is alert and oriented to person, place, and time.   Skin: Skin is warm and dry.   Vitals reviewed.        Health Maintenance       Date Due Completion Date    TETANUS VACCINE 03/29/1973 ---    Pap Smear with HPV Cotest 07/15/2018 7/15/2015    Influenza Vaccine 08/01/2018 3/17/2017 (Declined)    Override on 3/17/2017: Declined    Mammogram 01/04/2019 1/4/2018    Lipid Panel 03/17/2022 3/17/2017    Colonoscopy 11/03/2026 11/3/2016    Override on 5/7/2010: Done (Dr. Roberts, Elena DORADO)

## 2018-08-16 ENCOUNTER — LAB VISIT (OUTPATIENT)
Dept: LAB | Facility: HOSPITAL | Age: 63
End: 2018-08-16
Attending: FAMILY MEDICINE
Payer: COMMERCIAL

## 2018-08-16 ENCOUNTER — OFFICE VISIT (OUTPATIENT)
Dept: FAMILY MEDICINE | Facility: CLINIC | Age: 63
End: 2018-08-16
Payer: COMMERCIAL

## 2018-08-16 VITALS
SYSTOLIC BLOOD PRESSURE: 110 MMHG | HEART RATE: 91 BPM | TEMPERATURE: 99 F | WEIGHT: 293 LBS | OXYGEN SATURATION: 97 % | HEIGHT: 62 IN | DIASTOLIC BLOOD PRESSURE: 70 MMHG | BODY MASS INDEX: 53.92 KG/M2

## 2018-08-16 DIAGNOSIS — I10 ESSENTIAL HYPERTENSION: ICD-10-CM

## 2018-08-16 DIAGNOSIS — Z12.4 CERVICAL CANCER SCREENING: ICD-10-CM

## 2018-08-16 DIAGNOSIS — R73.03 PREDIABETES: ICD-10-CM

## 2018-08-16 DIAGNOSIS — Z00.00 ANNUAL PHYSICAL EXAM: Primary | ICD-10-CM

## 2018-08-16 LAB
ALBUMIN SERPL BCP-MCNC: 3.6 G/DL
ALP SERPL-CCNC: 90 U/L
ALT SERPL W/O P-5'-P-CCNC: 28 U/L
ANION GAP SERPL CALC-SCNC: 12 MMOL/L
AST SERPL-CCNC: 28 U/L
BASOPHILS # BLD AUTO: 0.02 K/UL
BASOPHILS NFR BLD: 0.3 %
BILIRUB SERPL-MCNC: 0.5 MG/DL
BUN SERPL-MCNC: 19 MG/DL
CALCIUM SERPL-MCNC: 9.6 MG/DL
CHLORIDE SERPL-SCNC: 101 MMOL/L
CO2 SERPL-SCNC: 25 MMOL/L
CREAT SERPL-MCNC: 0.9 MG/DL
DIFFERENTIAL METHOD: ABNORMAL
EOSINOPHIL # BLD AUTO: 0.1 K/UL
EOSINOPHIL NFR BLD: 1.2 %
ERYTHROCYTE [DISTWIDTH] IN BLOOD BY AUTOMATED COUNT: 14.3 %
EST. GFR  (AFRICAN AMERICAN): >60 ML/MIN/1.73 M^2
EST. GFR  (NON AFRICAN AMERICAN): >60 ML/MIN/1.73 M^2
GLUCOSE SERPL-MCNC: 88 MG/DL
HCT VFR BLD AUTO: 43 %
HGB BLD-MCNC: 13.4 G/DL
IMM GRANULOCYTES # BLD AUTO: 0.01 K/UL
IMM GRANULOCYTES NFR BLD AUTO: 0.1 %
LYMPHOCYTES # BLD AUTO: 1.7 K/UL
LYMPHOCYTES NFR BLD: 21.2 %
MCH RBC QN AUTO: 26.4 PG
MCHC RBC AUTO-ENTMCNC: 31.2 G/DL
MCV RBC AUTO: 85 FL
MONOCYTES # BLD AUTO: 0.8 K/UL
MONOCYTES NFR BLD: 10.3 %
NEUTROPHILS # BLD AUTO: 5.2 K/UL
NEUTROPHILS NFR BLD: 66.9 %
NRBC BLD-RTO: 0 /100 WBC
PLATELET # BLD AUTO: 230 K/UL
PMV BLD AUTO: 11.6 FL
POTASSIUM SERPL-SCNC: 3.9 MMOL/L
PROT SERPL-MCNC: 8.3 G/DL
RBC # BLD AUTO: 5.07 M/UL
SODIUM SERPL-SCNC: 138 MMOL/L
TSH SERPL DL<=0.005 MIU/L-ACNC: 0.98 UIU/ML
WBC # BLD AUTO: 7.78 K/UL

## 2018-08-16 PROCEDURE — 3074F SYST BP LT 130 MM HG: CPT | Mod: CPTII,S$GLB,, | Performed by: FAMILY MEDICINE

## 2018-08-16 PROCEDURE — 36415 COLL VENOUS BLD VENIPUNCTURE: CPT | Mod: PO

## 2018-08-16 PROCEDURE — 84443 ASSAY THYROID STIM HORMONE: CPT

## 2018-08-16 PROCEDURE — 88175 CYTOPATH C/V AUTO FLUID REDO: CPT

## 2018-08-16 PROCEDURE — 80053 COMPREHEN METABOLIC PANEL: CPT

## 2018-08-16 PROCEDURE — 3078F DIAST BP <80 MM HG: CPT | Mod: CPTII,S$GLB,, | Performed by: FAMILY MEDICINE

## 2018-08-16 PROCEDURE — 85025 COMPLETE CBC W/AUTO DIFF WBC: CPT

## 2018-08-16 PROCEDURE — 99396 PREV VISIT EST AGE 40-64: CPT | Mod: S$GLB,,, | Performed by: FAMILY MEDICINE

## 2018-08-16 PROCEDURE — 83036 HEMOGLOBIN GLYCOSYLATED A1C: CPT

## 2018-08-16 PROCEDURE — 87624 HPV HI-RISK TYP POOLED RSLT: CPT

## 2018-08-16 PROCEDURE — 99999 PR PBB SHADOW E&M-EST. PATIENT-LVL III: CPT | Mod: PBBFAC,,, | Performed by: FAMILY MEDICINE

## 2018-08-16 RX ORDER — PHENTERMINE HYDROCHLORIDE 15 MG/1
15 CAPSULE ORAL EVERY MORNING
Qty: 30 CAPSULE | Refills: 0 | Status: SHIPPED | OUTPATIENT
Start: 2018-08-16 | End: 2018-09-15

## 2018-08-16 NOTE — PROGRESS NOTES
Assessment & Plan  Problem List Items Addressed This Visit        Unprioritized    BMI 60.0-69.9, adult    Relevant Medications    phentermine 15 MG capsule    Essential hypertension      Other Visit Diagnoses     Annual physical exam    -  Primary    Relevant Orders    Liquid-based pap smear, screening    HPV High Risk Genotypes, PCR    Cervical cancer screening        Relevant Orders    Liquid-based pap smear, screening    HPV High Risk Genotypes, PCR      I addressed all major concerns as it related to health maintenance.  All were ordered and scheduled based on the patients wishes.  Any additional health maintenance will be readdressed at the next physical if declined or deferred by the patient.        Health Maintenance reviewed    Follow-up: No Follow-up on file.    ______________________________________________________________________    Chief Complaint  Chief Complaint   Patient presents with    Annual Exam       HPI  Hilary Wilson is a 63 y.o. female with multiple medical diagnoses as listed in the medical history and problem list that presents for annual exam.  Pt is known to me with last appointment 7/17/2018.    Patient denies any new symptoms including chest pain, SOB, blurry vision, N/V, diarrhea.    .        PAST MEDICAL HISTORY:  Past Medical History:   Diagnosis Date    Asthma     Hyperlipidemia     Hypertension     JOSUÉ on CPAP        PAST SURGICAL HISTORY:  Past Surgical History:   Procedure Laterality Date    BREAST BIOPSY Left     mass removal      TUBAL LIGATION         SOCIAL HISTORY:  Social History     Socioeconomic History    Marital status:      Spouse name: Not on file    Number of children: Not on file    Years of education: Not on file    Highest education level: Not on file   Social Needs    Financial resource strain: Not on file    Food insecurity - worry: Not on file    Food insecurity - inability: Not on file    Transportation needs - medical: Not on  file    Transportation needs - non-medical: Not on file   Occupational History    Not on file   Tobacco Use    Smoking status: Never Smoker    Smokeless tobacco: Never Used   Substance and Sexual Activity    Alcohol use: No    Drug use: Not on file    Sexual activity: Not on file   Other Topics Concern    Not on file   Social History Narrative    Not on file       FAMILY HISTORY:  Family History   Problem Relation Age of Onset    Hypertension Mother     Kidney disease Mother     Diabetes Father     Stroke Father     Heart disease Sister 49    Kidney disease Sister     Stroke Sister         in 50s       ALLERGIES AND MEDICATIONS: updated and reviewed.  Review of patient's allergies indicates:  No Known Allergies  Current Outpatient Medications   Medication Sig Dispense Refill    albuterol (PROAIR HFA) 90 mcg/actuation inhaler Inhale 2 puffs into the lungs every 6 (six) hours as needed for Wheezing. 18 g 2    albuterol (PROVENTIL) 2.5 mg /3 mL (0.083 %) nebulizer solution Take 3 mLs (2.5 mg total) by nebulization every 6 (six) hours as needed for Wheezing. Rescue 45 mL 2    etodolac (LODINE) 400 MG tablet Take 1 tablet (400 mg total) by mouth 2 (two) times daily. 60 tablet 0    hydroCHLOROthiazide (HYDRODIURIL) 25 MG tablet Take 1 tablet (25 mg total) by mouth once daily. 30 tablet 0    ibuprofen (ADVIL,MOTRIN) 800 MG tablet Take 1 tablet (800 mg total) by mouth 3 (three) times daily. 30 tablet 0    mupirocin (BACTROBAN) 2 % ointment Apply topically 3 (three) times daily. 30 g 0    phentermine 15 MG capsule Take 1 capsule (15 mg total) by mouth every morning. 30 capsule 0    simvastatin (ZOCOR) 20 MG tablet Take 1 tablet (20 mg total) by mouth every evening. 90 tablet 0     No current facility-administered medications for this visit.          ROS  Review of Systems   Constitutional: Negative for activity change, appetite change, fatigue, fever and unexpected weight change.   HENT: Negative.   "Negative for ear discharge, ear pain, rhinorrhea and sore throat.    Eyes: Negative.    Respiratory: Negative for apnea, cough, chest tightness, shortness of breath and wheezing.    Cardiovascular: Negative for chest pain, palpitations and leg swelling.   Gastrointestinal: Negative for abdominal distention, abdominal pain, constipation, diarrhea and vomiting.   Endocrine: Negative for cold intolerance, heat intolerance, polydipsia and polyuria.   Genitourinary: Negative for decreased urine volume, menstrual problem, urgency, vaginal bleeding, vaginal discharge and vaginal pain.   Musculoskeletal: Negative.    Skin: Negative for rash.   Neurological: Negative for dizziness and headaches.   Hematological: Does not bruise/bleed easily.   Psychiatric/Behavioral: Negative for agitation, sleep disturbance and suicidal ideas.           Physical Exam  Vitals:    08/16/18 1359   BP: 110/70   BP Location: Left arm   Patient Position: Sitting   BP Method: Large (Manual)   Pulse: 91   Temp: 98.5 °F (36.9 °C)   TempSrc: Oral   SpO2: 97%   Weight: (!) 151.8 kg (334 lb 10.5 oz)   Height: 5' 2" (1.575 m)    Body mass index is 61.21 kg/m².  Weight: (!) 151.8 kg (334 lb 10.5 oz)   Height: 5' 2" (157.5 cm)   Physical Exam   Constitutional: She is oriented to person, place, and time. She appears well-developed and well-nourished.   HENT:   Head: Normocephalic and atraumatic.   Right Ear: External ear normal.   Left Ear: External ear normal.   Nose: Nose normal.   Mouth/Throat: Oropharynx is clear and moist.   Eyes: Conjunctivae and EOM are normal. Pupils are equal, round, and reactive to light.   Neck: Normal range of motion. No JVD present. No thyromegaly present.   Cardiovascular: Normal rate, regular rhythm and normal heart sounds.   Pulmonary/Chest: Effort normal and breath sounds normal. She has no wheezes. Right breast exhibits no inverted nipple, no mass, no nipple discharge, no skin change and no tenderness. Left breast " exhibits no inverted nipple, no mass, no nipple discharge, no skin change and no tenderness.   Abdominal: Soft. Bowel sounds are normal. She exhibits no distension. There is no tenderness.   Genitourinary: Vagina normal and uterus normal. Pelvic exam was performed with patient supine. There is no rash, tenderness or lesion on the right labia. There is no rash, tenderness, lesion or injury on the left labia. Cervix exhibits no discharge and no friability. Right adnexum displays no mass, no tenderness and no fullness. Left adnexum displays no mass, no tenderness and no fullness.   Musculoskeletal: Normal range of motion.   Lymphadenopathy:     She has no cervical adenopathy.   Neurological: She is alert and oriented to person, place, and time. She has normal reflexes.   Skin: Skin is warm and dry.   Psychiatric: She has a normal mood and affect. Her behavior is normal. Judgment and thought content normal.   Vitals reviewed.        Health Maintenance       Date Due Completion Date    TETANUS VACCINE 03/29/1973 ---    Influenza Vaccine 08/01/2018 3/17/2017 (Declined)    Override on 3/17/2017: Declined    Pap Smear with HPV Cotest 07/15/2018 7/15/2015    Mammogram 01/04/2019 1/4/2018    Lipid Panel 03/17/2022 3/17/2017    Colonoscopy 11/03/2026 11/3/2016    Override on 5/7/2010: Done (Dr. Roberts, Elena DORADO)

## 2018-08-17 LAB
ESTIMATED AVG GLUCOSE: 131 MG/DL
HBA1C MFR BLD HPLC: 6.2 %

## 2018-08-22 LAB
HPV HR 12 DNA CVX QL NAA+PROBE: NEGATIVE
HPV16 AG SPEC QL: NEGATIVE
HPV18 DNA SPEC QL NAA+PROBE: NEGATIVE

## 2018-08-30 RX ORDER — AMOXICILLIN 500 MG/1
500 TABLET, FILM COATED ORAL 2 TIMES DAILY
Qty: 20 TABLET | Refills: 0 | Status: SHIPPED | OUTPATIENT
Start: 2018-08-30 | End: 2018-09-06

## 2018-09-19 ENCOUNTER — OFFICE VISIT (OUTPATIENT)
Dept: FAMILY MEDICINE | Facility: CLINIC | Age: 63
End: 2018-09-19
Payer: COMMERCIAL

## 2018-09-19 VITALS
OXYGEN SATURATION: 97 % | SYSTOLIC BLOOD PRESSURE: 110 MMHG | HEART RATE: 97 BPM | HEIGHT: 62 IN | BODY MASS INDEX: 53.92 KG/M2 | DIASTOLIC BLOOD PRESSURE: 80 MMHG | TEMPERATURE: 98 F | WEIGHT: 293 LBS

## 2018-09-19 DIAGNOSIS — R73.03 PREDIABETES: Primary | ICD-10-CM

## 2018-09-19 PROCEDURE — 99214 OFFICE O/P EST MOD 30 MIN: CPT | Mod: S$GLB,,, | Performed by: FAMILY MEDICINE

## 2018-09-19 PROCEDURE — 3008F BODY MASS INDEX DOCD: CPT | Mod: CPTII,S$GLB,, | Performed by: FAMILY MEDICINE

## 2018-09-19 PROCEDURE — 99999 PR PBB SHADOW E&M-EST. PATIENT-LVL III: CPT | Mod: PBBFAC,,, | Performed by: FAMILY MEDICINE

## 2018-09-19 PROCEDURE — 3079F DIAST BP 80-89 MM HG: CPT | Mod: CPTII,S$GLB,, | Performed by: FAMILY MEDICINE

## 2018-09-19 PROCEDURE — 3074F SYST BP LT 130 MM HG: CPT | Mod: CPTII,S$GLB,, | Performed by: FAMILY MEDICINE

## 2018-09-19 RX ORDER — PHENTERMINE HYDROCHLORIDE 15 MG/1
15 CAPSULE ORAL EVERY MORNING
Qty: 30 CAPSULE | Refills: 0 | Status: SHIPPED | OUTPATIENT
Start: 2018-09-19 | End: 2018-10-19

## 2018-09-19 RX ORDER — FUROSEMIDE 20 MG/1
20 TABLET ORAL DAILY
Qty: 7 TABLET | Refills: 0 | Status: SHIPPED | OUTPATIENT
Start: 2018-09-19 | End: 2019-03-12

## 2018-09-19 RX ORDER — POTASSIUM CHLORIDE 600 MG/1
8 CAPSULE, EXTENDED RELEASE ORAL DAILY
Qty: 7 CAPSULE | Refills: 0 | Status: SHIPPED | OUTPATIENT
Start: 2018-09-19 | End: 2019-10-09

## 2018-10-07 NOTE — PROGRESS NOTES
Assessment & Plan  Problem List Items Addressed This Visit        Unprioritized    BMI 60.0-69.9, adult    Relevant Medications    phentermine 15 MG capsule    Prediabetes - Primary    Relevant Medications    furosemide (LASIX) 20 MG tablet    potassium chloride (MICRO-K) 8 mEq CpSR        Continue with weight loss.  -  Unlimited green vegetables, meat, poultry, seafood, eggs and hard cheeses.   Avoid fried foods  Dressings, seasonings, condiments, etc should have less than 2 g sugars.   beans or nuts can have 1 x a day.   2-3 servings of citrus fruits, berries, pineapple or melon a day (1 cup)  Milk and plain yogurt     No soda, sweet tea, juices or lemonade     Exercise 20 min 3 times a week this month     Patient warned of common side effects of phentermine including anxiety, insomnia, palpitations and increased blood pressure. It was also explained that it is for short-term usage along with diet and exercise, and that stopping the medication without making lifestyle changes will result in regain of weight. Patient states understanding.     Start phentermine with 1/2 pill a day to see if that will control your appetite. Go up to a full pill when needed.         Weight loss medications are controlled substances. They require routine follow up. Prescription or pills that are lost or destroyed will not be replaced.     Inconsistency in diet will cause no change or increase in weight.          Health Maintenance reviewed,.    Follow-up: Follow-up in about 4 weeks (around 10/17/2018).    ______________________________________________________________________    Chief Complaint  Chief Complaint   Patient presents with    weight management       HPI  Pinkyjersey Yolie Wilson is a 63 y.o. female with multiple medical diagnoses as listed in the medical history and problem list that presents for weight loss.  Pt is known to me with last appointment 8/16/2018.      Would like to restart adipex.  Patient continues with weight  loss.  No recent illnesses at this time.  Patient denies any new symptoms including chest pain, SOB, blurry vision, N/V, diarrhea.        PAST MEDICAL HISTORY:  Past Medical History:   Diagnosis Date    Asthma     Hyperlipidemia     Hypertension     JOSUÉ on CPAP        PAST SURGICAL HISTORY:  Past Surgical History:   Procedure Laterality Date    BREAST BIOPSY Left     COLONOSCOPY N/A 11/3/2016    Procedure: COLONOSCOPY;  Surgeon: Zhang Diez MD;  Location: [facility] ENDO;  Service: Endoscopy;  Laterality: N/A;    COLONOSCOPY N/A 11/3/2016    Performed by Zhang Diez MD at [facility] ENDO    mass removal      TUBAL LIGATION         SOCIAL HISTORY:  Social History     Socioeconomic History    Marital status:      Spouse name: Not on file    Number of children: Not on file    Years of education: Not on file    Highest education level: Not on file   Social Needs    Financial resource strain: Not on file    Food insecurity - worry: Not on file    Food insecurity - inability: Not on file    Transportation needs - medical: Not on file    Transportation needs - non-medical: Not on file   Occupational History    Not on file   Tobacco Use    Smoking status: Never Smoker    Smokeless tobacco: Never Used   Substance and Sexual Activity    Alcohol use: No    Drug use: Not on file    Sexual activity: Not on file   Other Topics Concern    Not on file   Social History Narrative    Not on file       FAMILY HISTORY:  Family History   Problem Relation Age of Onset    Hypertension Mother     Kidney disease Mother     Diabetes Father     Stroke Father     Heart disease Sister 49    Kidney disease Sister     Stroke Sister         in 50s       ALLERGIES AND MEDICATIONS: updated and reviewed.  Review of patient's allergies indicates:  No Known Allergies  Current Outpatient Medications   Medication Sig Dispense Refill    albuterol (PROAIR HFA) 90 mcg/actuation inhaler Inhale 2 puffs into the lungs  every 6 (six) hours as needed for Wheezing. 18 g 2    albuterol (PROVENTIL) 2.5 mg /3 mL (0.083 %) nebulizer solution Take 3 mLs (2.5 mg total) by nebulization every 6 (six) hours as needed for Wheezing. Rescue 45 mL 2    hydroCHLOROthiazide (HYDRODIURIL) 25 MG tablet Take 1 tablet (25 mg total) by mouth once daily. 30 tablet 0    mupirocin (BACTROBAN) 2 % ointment Apply topically 3 (three) times daily. 30 g 0    etodolac (LODINE) 400 MG tablet Take 1 tablet (400 mg total) by mouth 2 (two) times daily. 60 tablet 0    furosemide (LASIX) 20 MG tablet Take 1 tablet (20 mg total) by mouth once daily. 7 tablet 0    ibuprofen (ADVIL,MOTRIN) 800 MG tablet Take 1 tablet (800 mg total) by mouth 3 (three) times daily. 30 tablet 0    phentermine 15 MG capsule Take 1 capsule (15 mg total) by mouth every morning. 30 capsule 0    potassium chloride (MICRO-K) 8 mEq CpSR Take 1 capsule (8 mEq total) by mouth once daily. 7 capsule 0     No current facility-administered medications for this visit.          ROS  Review of Systems   Constitutional: Negative for activity change, appetite change, fatigue, fever and unexpected weight change.   HENT: Negative.  Negative for ear discharge, ear pain, rhinorrhea and sore throat.    Eyes: Negative.    Respiratory: Negative for apnea, cough, chest tightness, shortness of breath and wheezing.    Cardiovascular: Negative for chest pain, palpitations and leg swelling.   Gastrointestinal: Negative for abdominal distention, abdominal pain, constipation, diarrhea and vomiting.   Endocrine: Negative for cold intolerance, heat intolerance, polydipsia and polyuria.   Genitourinary: Negative for decreased urine volume, menstrual problem, urgency, vaginal bleeding, vaginal discharge and vaginal pain.   Musculoskeletal: Negative.    Skin: Negative for rash.   Neurological: Negative for dizziness and headaches.   Hematological: Does not bruise/bleed easily.   Psychiatric/Behavioral: Negative for  "agitation, sleep disturbance and suicidal ideas.           Physical Exam  Vitals:    09/19/18 1423   BP: 110/80   BP Location: Left arm   Patient Position: Sitting   BP Method: X-Large (Manual)   Pulse: 97   Temp: 98.2 °F (36.8 °C)   TempSrc: Oral   SpO2: 97%   Weight: (!) 154.5 kg (340 lb 9.8 oz)   Height: 5' 2" (1.575 m)    Body mass index is 62.3 kg/m².  Weight: (!) 154.5 kg (340 lb 9.8 oz)   Height: 5' 2" (157.5 cm)   Physical Exam   Constitutional: She is oriented to person, place, and time. She appears well-developed and well-nourished.   HENT:   Head: Normocephalic.   Right Ear: External ear normal.   Left Ear: External ear normal.   Nose: Nose normal.   Mouth/Throat: Oropharynx is clear and moist.   Eyes: Conjunctivae and EOM are normal. Pupils are equal, round, and reactive to light.   Cardiovascular: Normal rate, regular rhythm and normal heart sounds.   Pulmonary/Chest: Effort normal and breath sounds normal.   Neurological: She is alert and oriented to person, place, and time.   Skin: Skin is warm and dry.   Vitals reviewed.        Health Maintenance       Date Due Completion Date    TETANUS VACCINE 03/29/1973 ---    Influenza Vaccine 08/01/2018 3/17/2017 (Declined)    Override on 3/17/2017: Declined    Mammogram 01/04/2019 1/4/2018    Pap Smear with HPV Cotest 08/16/2021 8/16/2018    Lipid Panel 03/17/2022 3/17/2017    Colonoscopy 11/03/2026 11/3/2016    Override on 5/7/2010: Done (Dr. Roberts, [facility])              "

## 2018-10-11 ENCOUNTER — TELEPHONE (OUTPATIENT)
Dept: FAMILY MEDICINE | Facility: CLINIC | Age: 63
End: 2018-10-11

## 2018-10-11 NOTE — TELEPHONE ENCOUNTER
Patient came in for her annual physical on august 16 th and she said that she shouldn't have to pay a co-payment but she received a bill for $25 she spoke with billing  and the billing office told her that the visit was put in as a preventative visit. She dose not know why. Please advise

## 2018-10-11 NOTE — TELEPHONE ENCOUNTER
----- Message from Gemma Reynolds sent at 10/11/2018 10:37 AM CDT -----  Contact: pt            Name of Who is Calling: pt      What is the request in detail: pt is requesting to speak to nurse regarding her co pay that was billed incorrectly. Call pt      Can the clinic reply by [facility]: no      What Number to [facility] Back [facility]: [phone]

## 2018-10-12 NOTE — TELEPHONE ENCOUNTER
Spoke with jacionia in billing  R/t patient concern of visit charge, states will place concern in review ref#[id number], results in 7-10 days.Spoke with patient to give above information.

## 2018-10-12 NOTE — TELEPHONE ENCOUNTER
Her preventative visit is an annual exam.  She should not have been charged a copay.  Please contact billing to determine why she was charged.

## 2018-10-17 ENCOUNTER — TELEPHONE (OUTPATIENT)
Dept: FAMILY MEDICINE | Facility: CLINIC | Age: 63
End: 2018-10-17

## 2018-10-17 NOTE — TELEPHONE ENCOUNTER
----- Message from Shelby adrianajavier sent at 10/17/2018  9:25 AM CDT -----  Contact: self - [phone]  Pt states she missed a call from staff. Please contact back at earliest convenience.

## 2018-12-13 ENCOUNTER — OFFICE VISIT (OUTPATIENT)
Dept: FAMILY MEDICINE | Facility: CLINIC | Age: 63
End: 2018-12-13
Payer: COMMERCIAL

## 2018-12-13 VITALS
HEIGHT: 62 IN | TEMPERATURE: 99 F | SYSTOLIC BLOOD PRESSURE: 100 MMHG | HEART RATE: 103 BPM | DIASTOLIC BLOOD PRESSURE: 70 MMHG | BODY MASS INDEX: 53.92 KG/M2 | OXYGEN SATURATION: 98 % | WEIGHT: 293 LBS

## 2018-12-13 DIAGNOSIS — I10 ESSENTIAL HYPERTENSION: Primary | ICD-10-CM

## 2018-12-13 PROCEDURE — 3078F DIAST BP <80 MM HG: CPT | Mod: CPTII,S$GLB,, | Performed by: FAMILY MEDICINE

## 2018-12-13 PROCEDURE — 3008F BODY MASS INDEX DOCD: CPT | Mod: CPTII,S$GLB,, | Performed by: FAMILY MEDICINE

## 2018-12-13 PROCEDURE — 99999 PR PBB SHADOW E&M-EST. PATIENT-LVL III: CPT | Mod: PBBFAC,,, | Performed by: FAMILY MEDICINE

## 2018-12-13 PROCEDURE — 99214 OFFICE O/P EST MOD 30 MIN: CPT | Mod: S$GLB,,, | Performed by: FAMILY MEDICINE

## 2018-12-13 PROCEDURE — 3074F SYST BP LT 130 MM HG: CPT | Mod: CPTII,S$GLB,, | Performed by: FAMILY MEDICINE

## 2018-12-13 RX ORDER — PHENTERMINE HYDROCHLORIDE 15 MG/1
15 CAPSULE ORAL EVERY MORNING
Qty: 30 CAPSULE | Refills: 0 | Status: SHIPPED | OUTPATIENT
Start: 2019-01-12 | End: 2019-02-11

## 2018-12-13 RX ORDER — PHENTERMINE HYDROCHLORIDE 15 MG/1
15 CAPSULE ORAL EVERY MORNING
Qty: 30 CAPSULE | Refills: 0 | Status: SHIPPED | OUTPATIENT
Start: 2019-02-11 | End: 2019-03-13

## 2018-12-13 RX ORDER — PHENTERMINE HYDROCHLORIDE 15 MG/1
15 CAPSULE ORAL EVERY MORNING
Qty: 30 CAPSULE | Refills: 0 | Status: SHIPPED | OUTPATIENT
Start: 2018-12-13 | End: 2019-01-12

## 2018-12-13 NOTE — PROGRESS NOTES
Assessment & Plan  Problem List Items Addressed This Visit        Cardiac/Vascular    Essential hypertension - Primary    Current Assessment & Plan     Well controlled.             Endocrine    BMI 60.0-69.9, adult    Current Assessment & Plan     Pt is currently stable on medication regimen.  Continue current therapy as scheduled.  Contact office with any questions about adjustments on medications.            Relevant Medications    phentermine 15 MG capsule    phentermine 15 MG capsule (Start on 1/12/2019)    phentermine 15 MG capsule (Start on 2/11/2019)            Health Maintenance reviewed    Follow-up: Follow-up in about 3 months (around 3/13/2019).    ______________________________________________________________________    Chief Complaint  Chief Complaint   Patient presents with    weight management    Shortness of Breath       HPI  Hilary Wilson is a 63 y.o. female with multiple medical diagnoses as listed in the medical history and problem list that presents for weight management.  Pt is known to me with last appointment 9/19/2018.    She has lost 3 lbs since her last evaluation.  She is currently on her cycle.  Her cycle is 4-5 days.  She is doing well with the medication.    She has increased her intake of protein shakes.       PAST MEDICAL HISTORY:  Past Medical History:   Diagnosis Date    Asthma     Hyperlipidemia     Hypertension     JOSUÉ on CPAP        PAST SURGICAL HISTORY:  Past Surgical History:   Procedure Laterality Date    BREAST BIOPSY Left     COLONOSCOPY N/A 11/3/2016    Procedure: COLONOSCOPY;  Surgeon: Zhang Diez MD;  Location: [facility] ENDO;  Service: Endoscopy;  Laterality: N/A;    COLONOSCOPY N/A 11/3/2016    Performed by Zhang Diez MD at [facility] ENDO    mass removal      TUBAL LIGATION         SOCIAL HISTORY:  Social History     Socioeconomic History    Marital status:      Spouse name: Not on file    Number of children: Not on file    Years of  education: Not on file    Highest education level: Not on file   Social Needs    Financial resource strain: Not on file    Food insecurity - worry: Not on file    Food insecurity - inability: Not on file    Transportation needs - medical: Not on file    Transportation needs - non-medical: Not on file   Occupational History    Not on file   Tobacco Use    Smoking status: Never Smoker    Smokeless tobacco: Never Used   Substance and Sexual Activity    Alcohol use: No    Drug use: Not on file    Sexual activity: Not on file   Other Topics Concern    Not on file   Social History Narrative    Not on file       FAMILY HISTORY:  Family History   Problem Relation Age of Onset    Hypertension Mother     Kidney disease Mother     Diabetes Father     Stroke Father     Heart disease Sister 49    Kidney disease Sister     Stroke Sister         in 50s       ALLERGIES AND MEDICATIONS: updated and reviewed.  Review of patient's allergies indicates:  No Known Allergies  Current Outpatient Medications   Medication Sig Dispense Refill    albuterol (PROAIR HFA) 90 mcg/actuation inhaler Inhale 2 puffs into the lungs every 6 (six) hours as needed for Wheezing. 18 g 2    etodolac (LODINE) 400 MG tablet Take 1 tablet (400 mg total) by mouth 2 (two) times daily. 60 tablet 0    furosemide (LASIX) 20 MG tablet Take 1 tablet (20 mg total) by mouth once daily. 7 tablet 0    hydroCHLOROthiazide (HYDRODIURIL) 25 MG tablet Take 1 tablet (25 mg total) by mouth once daily. 30 tablet 0    ibuprofen (ADVIL,MOTRIN) 800 MG tablet Take 1 tablet (800 mg total) by mouth 3 (three) times daily. 30 tablet 0    mupirocin (BACTROBAN) 2 % ointment Apply topically 3 (three) times daily. 30 g 0    potassium chloride (MICRO-K) 8 mEq CpSR Take 1 capsule (8 mEq total) by mouth once daily. 7 capsule 0    phentermine 15 MG capsule Take 1 capsule (15 mg total) by mouth every morning. 30 capsule 0    [START ON 1/12/2019] phentermine 15 MG  "capsule Take 1 capsule (15 mg total) by mouth every morning. 30 capsule 0    [START ON 2/11/2019] phentermine 15 MG capsule Take 1 capsule (15 mg total) by mouth every morning. 30 capsule 0     No current facility-administered medications for this visit.          ROS  Review of Systems   Constitutional: Negative for activity change, appetite change, fatigue, fever and unexpected weight change.   HENT: Negative.  Negative for ear discharge, ear pain, rhinorrhea and sore throat.    Eyes: Negative.    Respiratory: Negative for apnea, cough, chest tightness, shortness of breath and wheezing.    Cardiovascular: Negative for chest pain, palpitations and leg swelling.   Gastrointestinal: Negative for abdominal distention, abdominal pain, constipation, diarrhea and vomiting.   Endocrine: Negative for cold intolerance, heat intolerance, polydipsia and polyuria.   Genitourinary: Negative for decreased urine volume and urgency.   Musculoskeletal: Negative.    Skin: Negative for rash.   Neurological: Negative for dizziness and headaches.   Hematological: Does not bruise/bleed easily.   Psychiatric/Behavioral: Negative for agitation, sleep disturbance and suicidal ideas.           Physical Exam  Vitals:    12/13/18 1314   BP: 100/70   BP Location: Left arm   Patient Position: Sitting   BP Method: X-Large (Manual)   Pulse: 103   Temp: 98.5 °F (36.9 °C)   TempSrc: Oral   SpO2: 98%   Weight: (!) 153.1 kg (337 lb 8.4 oz)   Height: 5' 2" (1.575 m)    Body mass index is 61.73 kg/m².  Weight: (!) 153.1 kg (337 lb 8.4 oz)   Height: 5' 2" (157.5 cm)   Physical Exam   Constitutional: She is oriented to person, place, and time. She appears well-developed and well-nourished.   HENT:   Head: Normocephalic.   Right Ear: External ear normal.   Left Ear: External ear normal.   Nose: Nose normal.   Mouth/Throat: Oropharynx is clear and moist.   Eyes: Conjunctivae and EOM are normal. Pupils are equal, round, and reactive to light. "   Cardiovascular: Normal rate, regular rhythm and normal heart sounds.   Pulmonary/Chest: Effort normal and breath sounds normal.   Neurological: She is alert and oriented to person, place, and time.   Skin: Skin is warm and dry.   Vitals reviewed.        Health Maintenance       Date Due Completion Date    TETANUS VACCINE 03/29/1973 ---    Influenza Vaccine 08/01/2018 3/17/2017 (Declined)    Override on 3/17/2017: Declined    Mammogram 01/04/2019 1/4/2018    Pap Smear with HPV Cotest 08/16/2021 8/16/2018    Lipid Panel 03/17/2022 3/17/2017    Colonoscopy 11/03/2026 11/3/2016    Override on 5/7/2010: Done (Dr. Roberts, [facility])              Patient note was created using [employer].  Any errors in syntax or even information may not have been identified and edited on initial review prior to signing this note.

## 2018-12-17 DIAGNOSIS — N92.1 MENORRHAGIA WITH IRREGULAR CYCLE: Primary | ICD-10-CM

## 2018-12-21 RX ORDER — HYDROCHLOROTHIAZIDE 25 MG/1
25 TABLET ORAL DAILY
Qty: 90 TABLET | Refills: 3 | Status: SHIPPED | OUTPATIENT
Start: 2018-12-21 | End: 2019-02-01 | Stop reason: SDUPTHER

## 2018-12-21 NOTE — TELEPHONE ENCOUNTER
----- Message from Landon Woods sent at 12/21/2018  1:58 PM CST -----  Contact: Self/[phone]  Refill:  hydroCHLOROthiazide (HYDRODIURIL) 25 MG tablet      [id number] - [place] - [address]  [address] [address]  Phone: [phone] Fax: [phone]    Thank you.

## 2018-12-27 ENCOUNTER — LAB VISIT (OUTPATIENT)
Dept: LAB | Facility: HOSPITAL | Age: 63
End: 2018-12-27
Attending: OBSTETRICS & GYNECOLOGY
Payer: COMMERCIAL

## 2018-12-27 ENCOUNTER — OFFICE VISIT (OUTPATIENT)
Dept: OBSTETRICS AND GYNECOLOGY | Facility: CLINIC | Age: 63
End: 2018-12-27
Payer: COMMERCIAL

## 2018-12-27 VITALS
BODY MASS INDEX: 53.92 KG/M2 | WEIGHT: 293 LBS | SYSTOLIC BLOOD PRESSURE: 124 MMHG | HEIGHT: 62 IN | DIASTOLIC BLOOD PRESSURE: 59 MMHG

## 2018-12-27 DIAGNOSIS — N93.9 ABNORMAL UTERINE BLEEDING (AUB): Primary | ICD-10-CM

## 2018-12-27 DIAGNOSIS — N95.9 UNSPECIFIED MENOPAUSAL AND PERIMENOPAUSAL DISORDER: ICD-10-CM

## 2018-12-27 LAB
ESTRADIOL SERPL-MCNC: 39 PG/ML
FSH SERPL-ACNC: 36.3 MIU/ML
LH SERPL-ACNC: 22.5 MIU/ML

## 2018-12-27 PROCEDURE — 99999 PR PBB SHADOW E&M-EST. PATIENT-LVL III: CPT | Mod: PBBFAC,,, | Performed by: OBSTETRICS & GYNECOLOGY

## 2018-12-27 PROCEDURE — 87660 TRICHOMONAS VAGIN DIR PROBE: CPT

## 2018-12-27 PROCEDURE — 87591 N.GONORRHOEAE DNA AMP PROB: CPT

## 2018-12-27 PROCEDURE — 82670 ASSAY OF TOTAL ESTRADIOL: CPT

## 2018-12-27 PROCEDURE — 36415 COLL VENOUS BLD VENIPUNCTURE: CPT

## 2018-12-27 PROCEDURE — 87491 CHLMYD TRACH DNA AMP PROBE: CPT

## 2018-12-27 PROCEDURE — 83002 ASSAY OF GONADOTROPIN (LH): CPT

## 2018-12-27 PROCEDURE — 83001 ASSAY OF GONADOTROPIN (FSH): CPT

## 2018-12-27 PROCEDURE — 3008F BODY MASS INDEX DOCD: CPT | Mod: CPTII,S$GLB,, | Performed by: OBSTETRICS & GYNECOLOGY

## 2018-12-27 PROCEDURE — 3074F SYST BP LT 130 MM HG: CPT | Mod: CPTII,S$GLB,, | Performed by: OBSTETRICS & GYNECOLOGY

## 2018-12-27 PROCEDURE — 99204 OFFICE O/P NEW MOD 45 MIN: CPT | Mod: S$GLB,,, | Performed by: OBSTETRICS & GYNECOLOGY

## 2018-12-27 PROCEDURE — 3078F DIAST BP <80 MM HG: CPT | Mod: CPTII,S$GLB,, | Performed by: OBSTETRICS & GYNECOLOGY

## 2018-12-27 NOTE — LETTER
December 27, 2018      Theodora Morales, FNP-C  [address] [facility] [address]           [facility] - OB/ GYN  [address] [facility] [address]  [facility] [address]  Phone: [phone]          Patient: Hilary Wilson   MR Number: [id number]   YOB: 1955   Date of Visit: 12/27/2018       Dear Theodora Morales:    Thank you for referring Hilary Wilson to me for evaluation. Attached you will find relevant portions of my assessment and plan of care.    If you have questions, please do not hesitate to call me. I look forward to following Hilary Wilson along with you.    Sincerely,    Elías Nolasco MD    Enclosure  CC:  No Recipients    If you would like to receive this communication electronically, please contact [email or web address] or [phone] to request more information on [employer] access.    For providers and/or their staff who would like to refer a patient to [facility], please contact us through our one-stop-shop provider referral line, [facility] , at [phone].    If you feel you have received this communication in error or would no longer like to receive these types of communications, please e-mail [email or web address]

## 2018-12-27 NOTE — PATIENT INSTRUCTIONS
Endometrial Biopsy    Endometrial biopsy is a procedure used to study the endometrium (lining of the uterus). It is usually done in your health care providers office. During the biopsy, small tissue samples are taken from inside the uterus. These are then sent to a lab for study. If any problems are found, you and your health care provider will discuss treatment options. The biopsy usually takes only a few minutes, and you can often go back to your normal routine as soon as the procedure is over.  Reasons for the procedure  Endometrial biopsy may help pinpoint the cause of certain problems. These include:  · Bleeding after menopause  · Heavy or irregular menstrual periods  · Bleeding associated with hormone therapy  · Prolonged bleeding  · Abnormal Pap test results  · Having certain types of cancer  · Trouble getting pregnant (fertility problems)  What are the risks?  Problems with endometrial biopsy are rare, but can include:  · Bleeding  · Infection  · Damage to the uterine wall (very rare)  Getting ready for the procedure  Your health care provider will ask about your health and any medicines you take, like blood thinners. Before your biopsy, you may have tests to make sure youre not pregnant or have an infection. You may also be asked to sign a consent form. A day or 2 before the procedure:   · Avoid using creams or other vaginal medicines.  · Avoid douching.  · Ask your health care provider if you should take pain medicines shortly before the test.  During the biopsy  During the biopsy, you will likely experience the following:  · You will be asked to lie on an exam table with your knees bent, just as you do for a Pap test.  · You may have a brief pelvic exam. An instrument called a speculum is then inserted into the vagina to hold it open.  · An antiseptic solution may be applied to the cervix. The cervix may also be numbed with an anesthetic or dilated to widen the opening.  · A small tube is passed  through the cervix into the uterus.  · It is normal to feel some cramping when the tube is inserted. But tell your health care provider if you have severe cramping or are very uncomfortable.  · Using mild suction, samples are taken from the uterine lining. You may feel pinching or additional cramping when this is done.  · The tube and speculum are then removed and the samples are sent to a lab for study.  After the procedure  After the procedure, you may experience the following:  · If you feel lightheaded or dizzy, you can rest on the table until youre ready to get dressed.  · For a few hours, you may feel some mild cramping. This can usually be relieved with over-the-counter pain medicines.  · You may have some bleeding for a few days. Use pads instead of tampons.  · Dont douche or use any vaginal medicines unless your health care provider says its OK.  · Ask your health care provider when its OK to have sex again.  Follow-up care  It will take about a week for the biopsy results to come back from the lab. Then you and your health care provider can discuss the results. These may show that no treatment is required. Or, you may be scheduled for a follow-up appointment and more tests. If your biopsy was done for fertility problems, be sure to record the day when your next period begins.     Call your health care provider   Contact your health care provider if you have any of the following:  · Heavy bleeding (more than a pad an hour for 2 hours).  · Severe cramping or increasing pain.  · Fever over 100.4°F (38.0°C)  · Foul-smelling or unusual vaginal discharge.   Date Last Reviewed: 5/10/2015  © 6439-2465 [email or web address]. [address] [place] [address], [place] [address]. All rights reserved. This information is not intended as a substitute for professional medical care. Always follow your healthcare professional's instructions.

## 2018-12-28 LAB
C TRACH DNA SPEC QL NAA+PROBE: NOT DETECTED
CANDIDA RRNA VAG QL PROBE: NEGATIVE
G VAGINALIS RRNA GENITAL QL PROBE: NEGATIVE
N GONORRHOEA DNA SPEC QL NAA+PROBE: NOT DETECTED
T VAGINALIS RRNA GENITAL QL PROBE: NEGATIVE

## 2019-01-03 ENCOUNTER — HOSPITAL ENCOUNTER (OUTPATIENT)
Dept: RADIOLOGY | Facility: HOSPITAL | Age: 64
Discharge: HOME | End: 2019-01-03
Attending: OBSTETRICS & GYNECOLOGY
Payer: COMMERCIAL

## 2019-01-03 DIAGNOSIS — N93.9 ABNORMAL UTERINE BLEEDING (AUB): ICD-10-CM

## 2019-01-03 PROCEDURE — 76856 US PELVIS COMP WITH TRANSVAG NON-OB (XPD): ICD-10-PCS | Mod: 26,,, | Performed by: RADIOLOGY

## 2019-01-03 PROCEDURE — 76830 TRANSVAGINAL US NON-OB: CPT | Mod: 26,,, | Performed by: RADIOLOGY

## 2019-01-03 PROCEDURE — 76856 US EXAM PELVIC COMPLETE: CPT | Mod: 26,,, | Performed by: RADIOLOGY

## 2019-01-03 PROCEDURE — 76830 TRANSVAGINAL US NON-OB: CPT | Mod: TC

## 2019-01-03 PROCEDURE — 76830 US PELVIS COMP WITH TRANSVAG NON-OB (XPD): ICD-10-PCS | Mod: 26,,, | Performed by: RADIOLOGY

## 2019-01-08 ENCOUNTER — PROCEDURE VISIT (OUTPATIENT)
Dept: OBSTETRICS AND GYNECOLOGY | Facility: CLINIC | Age: 64
End: 2019-01-08
Payer: COMMERCIAL

## 2019-01-08 VITALS
BODY MASS INDEX: 53.92 KG/M2 | HEIGHT: 62 IN | DIASTOLIC BLOOD PRESSURE: 61 MMHG | SYSTOLIC BLOOD PRESSURE: 127 MMHG | WEIGHT: 293 LBS

## 2019-01-08 DIAGNOSIS — N95.0 POSTMENOPAUSAL BLEEDING: Primary | ICD-10-CM

## 2019-01-08 DIAGNOSIS — Z01.812 PRE-PROCEDURE LAB EXAM: ICD-10-CM

## 2019-01-08 LAB
B-HCG UR QL: NEGATIVE
CTP QC/QA: YES

## 2019-01-08 PROCEDURE — 88305 TISSUE EXAM BY PATHOLOGIST: CPT | Mod: 26,,, | Performed by: PATHOLOGY

## 2019-01-08 PROCEDURE — 58100 PR BIOPSY OF UTERUS LINING: ICD-10-PCS | Mod: S$GLB,,, | Performed by: OBSTETRICS & GYNECOLOGY

## 2019-01-08 PROCEDURE — 88305 TISSUE EXAM BY PATHOLOGIST: CPT | Performed by: PATHOLOGY

## 2019-01-08 PROCEDURE — 81025 URINE PREGNANCY TEST: CPT | Mod: S$GLB,,, | Performed by: OBSTETRICS & GYNECOLOGY

## 2019-01-08 PROCEDURE — 81025 POCT URINE PREGNANCY: ICD-10-PCS | Mod: S$GLB,,, | Performed by: OBSTETRICS & GYNECOLOGY

## 2019-01-08 PROCEDURE — 88305 TISSUE SPECIMEN TO PATHOLOGY, OBSTETRICS/GYNECOLOGY: ICD-10-PCS | Mod: 26,,, | Performed by: PATHOLOGY

## 2019-01-08 PROCEDURE — 99214 PR OFFICE/OUTPT VISIT, EST, LEVL IV, 30-39 MIN: ICD-10-PCS | Mod: 25,S$GLB,, | Performed by: OBSTETRICS & GYNECOLOGY

## 2019-01-08 PROCEDURE — 99214 OFFICE O/P EST MOD 30 MIN: CPT | Mod: 25,S$GLB,, | Performed by: OBSTETRICS & GYNECOLOGY

## 2019-01-08 PROCEDURE — 58100 BIOPSY OF UTERUS LINING: CPT | Mod: S$GLB,,, | Performed by: OBSTETRICS & GYNECOLOGY

## 2019-01-08 NOTE — PATIENT INSTRUCTIONS
Endometrial Biopsy    Endometrial biopsy is a procedure used to study the endometrium (lining of the uterus). It is usually done in your health care providers office. During the biopsy, small tissue samples are taken from inside the uterus. These are then sent to a lab for study. If any problems are found, you and your health care provider will discuss treatment options. The biopsy usually takes only a few minutes, and you can often go back to your normal routine as soon as the procedure is over.  Reasons for the procedure  Endometrial biopsy may help pinpoint the cause of certain problems. These include:  · Bleeding after menopause  · Heavy or irregular menstrual periods  · Bleeding associated with hormone therapy  · Prolonged bleeding  · Abnormal Pap test results  · Having certain types of cancer  · Trouble getting pregnant (fertility problems)  What are the risks?  Problems with endometrial biopsy are rare, but can include:  · Bleeding  · Infection  · Damage to the uterine wall (very rare)  Getting ready for the procedure  Your health care provider will ask about your health and any medicines you take, like blood thinners. Before your biopsy, you may have tests to make sure youre not pregnant or have an infection. You may also be asked to sign a consent form. A day or 2 before the procedure:   · Avoid using creams or other vaginal medicines.  · Avoid douching.  · Ask your health care provider if you should take pain medicines shortly before the test.  During the biopsy  During the biopsy, you will likely experience the following:  · You will be asked to lie on an exam table with your knees bent, just as you do for a Pap test.  · You may have a brief pelvic exam. An instrument called a speculum is then inserted into the vagina to hold it open.  · An antiseptic solution may be applied to the cervix. The cervix may also be numbed with an anesthetic or dilated to widen the opening.  · A small tube is passed  through the cervix into the uterus.  · It is normal to feel some cramping when the tube is inserted. But tell your health care provider if you have severe cramping or are very uncomfortable.  · Using mild suction, samples are taken from the uterine lining. You may feel pinching or additional cramping when this is done.  · The tube and speculum are then removed and the samples are sent to a lab for study.  After the procedure  After the procedure, you may experience the following:  · If you feel lightheaded or dizzy, you can rest on the table until youre ready to get dressed.  · For a few hours, you may feel some mild cramping. This can usually be relieved with over-the-counter pain medicines.  · You may have some bleeding for a few days. Use pads instead of tampons.  · Dont douche or use any vaginal medicines unless your health care provider says its OK.  · Ask your health care provider when its OK to have sex again.  Follow-up care  It will take about a week for the biopsy results to come back from the lab. Then you and your health care provider can discuss the results. These may show that no treatment is required. Or, you may be scheduled for a follow-up appointment and more tests. If your biopsy was done for fertility problems, be sure to record the day when your next period begins.     Call your health care provider   Contact your health care provider if you have any of the following:  · Heavy bleeding (more than a pad an hour for 2 hours).  · Severe cramping or increasing pain.  · Fever over 100.4°F (38.0°C)  · Foul-smelling or unusual vaginal discharge.   Date Last Reviewed: 5/10/2015  © 7185-5257 [employer]. [address] [place] [address], [place] [address]. All rights reserved. This information is not intended as a substitute for professional medical care. Always follow your healthcare professional's instructions.

## 2019-01-08 NOTE — PROCEDURES
History & Physical  Gynecology      SUBJECTIVE:     Chief Complaint: Endometrial Biopsy and Follow-up (discuss U/S)       History of Present Illness:  Ms. Wilson presents today for EMBx and to discuss lab results. Discussed lab and TVUS results with patient. She understands that she should not be having periods. According to her age and labs, patient should be PMB. She reports she has had vaginal bleeding daily for the past month stopping for only a few days at a time. She even reports having episodes of heavy vaginal bleeding with golf ball sized blood clots. She denies hot flashes, night sweats, and a 12 month period of no menses. She reports that before this episode she would have normal menses monthly for 2-3 days. She reports that her periods only stopped for 3 months a few years ago.     Review of patient's allergies indicates:  No Known Allergies    Past Medical History:   Diagnosis Date    Asthma     Hyperlipidemia     Hypertension     JOSUÉ on CPAP      Past Surgical History:   Procedure Laterality Date    BREAST BIOPSY Left     COLONOSCOPY N/A 11/3/2016    [facility]formed by [facility]    mass removal      TUBAL LIGATION       OB History      Para Term  AB Living    3 3 3     3    SAB TAB Ectopic Multiple Live Births                     Family History   Problem Relation Age of Onset    Hypertension Mother     Kidney disease Mother     Diabetes Father     Stroke Father     Heart disease Sister 49    Kidney disease Sister     Stroke Sister         in 50s     Social History     Tobacco Use    Smoking status: Never Smoker    Smokeless tobacco: Never Used   Substance Use Topics    Alcohol use: No    Drug use: No       Current Outpatient Medications   Medication Sig    albuterol (PROAIR HFA) 90 mcg/actuation inhaler Inhale 2 puffs into the lungs every 6 (six) hours as needed for Wheezing.    etodolac (LODINE) 400 MG tablet Take 1 tablet (400 mg total) by mouth 2  (two) times daily.    furosemide (LASIX) 20 MG tablet Take 1 tablet (20 mg total) by mouth once daily.    hydroCHLOROthiazide (HYDRODIURIL) 25 MG tablet Take 1 tablet (25 mg total) by mouth once daily.    ibuprofen (ADVIL,MOTRIN) 800 MG tablet Take 1 tablet (800 mg total) by mouth 3 (three) times daily.    mupirocin (BACTROBAN) 2 % ointment Apply topically 3 (three) times daily.    phentermine 15 MG capsule Take 1 capsule (15 mg total) by mouth every morning.    [START ON 1/12/2019] phentermine 15 MG capsule Take 1 capsule (15 mg total) by mouth every morning.    [START ON 2/11/2019] phentermine 15 MG capsule Take 1 capsule (15 mg total) by mouth every morning.    potassium chloride (MICRO-K) 8 mEq CpSR Take 1 capsule (8 mEq total) by mouth once daily.     No current facility-administered medications for this visit.      Review of Systems:  Review of Systems   Constitutional: Negative for chills and fever.   Respiratory: Positive for shortness of breath. Negative for cough and wheezing.    Cardiovascular: Negative for chest pain and palpitations.   Gastrointestinal: Negative for abdominal pain, nausea and vomiting.   Genitourinary: Positive for postmenopausal bleeding. Negative for dysuria, frequency, hematuria, pelvic pain, vaginal bleeding, vaginal discharge and vaginal pain.   Neurological: Negative for headaches.        OBJECTIVE:     Physical Exam:  Physical Exam   Constitutional: She is oriented to person, place, and time. She appears well-developed and well-nourished.   Cardiovascular: Normal rate.   Pulmonary/Chest: Effort normal.   Genitourinary:   Genitourinary Comments: Vaginal bleeding   Neurological: She is alert and oriented to person, place, and time.   Skin: Skin is warm and dry.   Psychiatric: She has a normal mood and affect.   Nursing note and vitals reviewed.    ASSESSMENT:       ICD-10-CM ICD-9-CM    1. Postmenopausal bleeding N95.0 627.1 Tissue Specimen To Pathology,  Obstetrics/Gynecology      Endometrial Biopsy- Today      Biopsy (Gynecological)   2. Pre-procedure lab exam Z01.812 V72.63 POCT urine pregnancy      Plan:      Hilary was seen today for endometrial biopsy and follow-up.    Diagnoses and all orders for this visit:    Postmenopausal bleeding  -     Tissue Specimen To Pathology, Obstetrics/Gynecology  -     Endometrial Biopsy- Today  -     Biopsy (Gynecological)  - Discussed with patient the etiologies of PMB. Offered Hysteroscopy D&C which could also be therapeutic but patient declined stating that she will only go under general anesthesia once. She will discussed with family but will likely proceed with hysterectomy for uterine fibroids.     Pre-procedure lab exam  -     POCT urine pregnancy        Orders Placed This Encounter   Procedures    Endometrial Biopsy- Today    Biopsy (Gynecological)    POCT urine pregnancy       Follow-up if symptoms worsen or fail to improve.    Counseling time: 30 minutes    Elías Nolasco

## 2019-01-08 NOTE — PROCEDURES
Biopsy (Gynecological)  Date/Time: 1/8/2019 1:58 PM  Performed by: Elías Nolasco MD  Authorized by: Elías Nolasco MD     Consent Done?:  Yes (Verbal)   Patient was prepped and draped in the normal sterile fashion.  Local anesthesia used?: No      Biopsy Location:  Uterus   Patient tolerated the procedure well with no immediate complications.     DATE: January8th, 2019    TIME: 1:42 PM    PROCEDURE: Endometrial biopsy    INDICATION: Postmenopausal Bleeding    PATIENT CONSENT:     PRE ENDOMETRIAL BIOPSY COUNSELING:  The patient was informed of the risk of bleeding, infection, uterine perforation and pain and that the test will rule-out endometrial cancer with accuracy greater than 95%. She was counseled on the alternatives to endometrial biopsy. All of her questions were answered. Patient gives verbal consent    ANESTHESIA: None    Labs: UPT performed prior to the procedure was negative.    PROCEDURE NOTE:  The cervix was visualized with a speculum and swabbed with Betadinex3.  The anterior lip of the cervix was grasped with a single toothed tenaculum, and a sterile endometrial pipelle was inserted into the uterus to a sound length of 14 cm. 3 passes were made with the pipelle and heavy amount of tissue was obtained. The specimen was placed in formalin and sent to Pathology for evaluation.     COMPLICATIONS: None    DISPOSITION: The patient tolerated the above procedure well.      POST ENDOMETRIAL BIOPSY COUNSELING:  - Manage post biopsy cramping with NSAIDs or Tylenol.  - Expect spotting or light bleeding for a few days.  - Report bleeding heavier than a period, fever > 101.0 F, worsening pain or a foul  smelling vaginal discharge.      Elías Nolasco MD 01/08/2019 1:58 PM

## 2019-01-17 ENCOUNTER — OFFICE VISIT (OUTPATIENT)
Dept: OBSTETRICS AND GYNECOLOGY | Facility: CLINIC | Age: 64
End: 2019-01-17
Payer: COMMERCIAL

## 2019-01-17 VITALS
SYSTOLIC BLOOD PRESSURE: 122 MMHG | BODY MASS INDEX: 53.92 KG/M2 | WEIGHT: 293 LBS | HEIGHT: 62 IN | DIASTOLIC BLOOD PRESSURE: 64 MMHG

## 2019-01-17 DIAGNOSIS — N85.02 ENDOMETRIAL HYPERPLASIA WITH ATYPIA: ICD-10-CM

## 2019-01-17 DIAGNOSIS — N93.9 ABNORMAL UTERINE BLEEDING (AUB): Primary | ICD-10-CM

## 2019-01-17 PROCEDURE — 3078F DIAST BP <80 MM HG: CPT | Mod: CPTII,S$GLB,, | Performed by: OBSTETRICS & GYNECOLOGY

## 2019-01-17 PROCEDURE — 3008F BODY MASS INDEX DOCD: CPT | Mod: CPTII,S$GLB,, | Performed by: OBSTETRICS & GYNECOLOGY

## 2019-01-17 PROCEDURE — 99214 OFFICE O/P EST MOD 30 MIN: CPT | Mod: S$GLB,,, | Performed by: OBSTETRICS & GYNECOLOGY

## 2019-01-17 PROCEDURE — 3074F PR MOST RECENT SYSTOLIC BLOOD PRESSURE < 130 MM HG: ICD-10-PCS | Mod: CPTII,S$GLB,, | Performed by: OBSTETRICS & GYNECOLOGY

## 2019-01-17 PROCEDURE — 99999 PR PBB SHADOW E&M-EST. PATIENT-LVL IV: ICD-10-PCS | Mod: PBBFAC,,, | Performed by: OBSTETRICS & GYNECOLOGY

## 2019-01-17 PROCEDURE — 99214 PR OFFICE/OUTPT VISIT, EST, LEVL IV, 30-39 MIN: ICD-10-PCS | Mod: S$GLB,,, | Performed by: OBSTETRICS & GYNECOLOGY

## 2019-01-17 PROCEDURE — 3008F PR BODY MASS INDEX (BMI) DOCUMENTED: ICD-10-PCS | Mod: CPTII,S$GLB,, | Performed by: OBSTETRICS & GYNECOLOGY

## 2019-01-17 PROCEDURE — 3074F SYST BP LT 130 MM HG: CPT | Mod: CPTII,S$GLB,, | Performed by: OBSTETRICS & GYNECOLOGY

## 2019-01-17 PROCEDURE — 99999 PR PBB SHADOW E&M-EST. PATIENT-LVL IV: CPT | Mod: PBBFAC,,, | Performed by: OBSTETRICS & GYNECOLOGY

## 2019-01-17 PROCEDURE — 3078F PR MOST RECENT DIASTOLIC BLOOD PRESSURE < 80 MM HG: ICD-10-PCS | Mod: CPTII,S$GLB,, | Performed by: OBSTETRICS & GYNECOLOGY

## 2019-01-17 NOTE — PROGRESS NOTES
History & Physical  Gynecology      SUBJECTIVE:     Chief Complaint: Follow-up (discuss results )       History of Present Illness:  Ms. Wilson is a 63 year old female who presented to clinic c/o vaginal bleeding x 3 weeks. She reported that she would stop for a day or so but mostly has been constant. She reported that she uses 2 pads at one time. She reported that she changes the pads 3 times a day. She reported that she is passing blood clots up to the size of a golf ball. She also reported that she  she recalls soiling her clothing. She denies hot flashes, night sweats, and a 12 month period of no menses. She reported that before this episode she would have normal menses monthly for 2-3 days. She reported that her periods only stopped for 3 months a few years ago.     TVUS was done and EMS was thickened. EMBx was then done with a small sample size but atypical endometrial hyperplasia was found. The patient presents today to discuss results.    FINAL PATHOLOGIC DIAGNOSIS  Endometrial biopsy:  - A fragment of atypical endometrial hyperplasia among blood clots .  Note: Due to the small size of the sampling, a high-grade lesion cannot be completely ruled out. Re-biopsy or  clinical and imaging correlation is recommended.    Review of patient's allergies indicates:  No Known Allergies    Past Medical History:   Diagnosis Date    Asthma     Hyperlipidemia     Hypertension     JOSUÉ on CPAP      Past Surgical History:   Procedure Laterality Date    BREAST BIOPSY Left     COLONOSCOPY N/A 11/3/2016    [facility] ENDO    mass removal      TUBAL LIGATION       OB History      Para Term  AB Living    3 3 3     3    SAB TAB Ectopic Multiple Live Births                     Family History   Problem Relation Age of Onset    Hypertension Mother     Kidney disease Mother     Diabetes Father     Stroke Father     Heart disease Sister 49    Kidney disease Sister      Stroke Sister         in 50s     Social History     Tobacco Use    Smoking status: Never Smoker    Smokeless tobacco: Never Used   Substance Use Topics    Alcohol use: No    Drug use: No       Current Outpatient Medications   Medication Sig    albuterol (PROAIR HFA) 90 mcg/actuation inhaler Inhale 2 puffs into the lungs every 6 (six) hours as needed for Wheezing.    etodolac (LODINE) 400 MG tablet Take 1 tablet (400 mg total) by mouth 2 (two) times daily.    furosemide (LASIX) 20 MG tablet Take 1 tablet (20 mg total) by mouth once daily.    hydroCHLOROthiazide (HYDRODIURIL) 25 MG tablet Take 1 tablet (25 mg total) by mouth once daily.    ibuprofen (ADVIL,MOTRIN) 800 MG tablet Take 1 tablet (800 mg total) by mouth 3 (three) times daily.    mupirocin (BACTROBAN) 2 % ointment Apply topically 3 (three) times daily.    phentermine 15 MG capsule Take 1 capsule (15 mg total) by mouth every morning.    [START ON 2/11/2019] phentermine 15 MG capsule Take 1 capsule (15 mg total) by mouth every morning.    potassium chloride (MICRO-K) 8 mEq CpSR Take 1 capsule (8 mEq total) by mouth once daily.     No current facility-administered medications for this visit.        Review of Systems:  Review of Systems   Constitutional: Negative for chills and fever.   Eyes: Negative for visual disturbance.   Respiratory: Negative for cough and wheezing.    Cardiovascular: Negative for chest pain and palpitations.   Gastrointestinal: Negative for abdominal pain, nausea and vomiting.   Genitourinary: Positive for postmenopausal bleeding. Negative for dysuria, frequency, hematuria, pelvic pain, vaginal bleeding, vaginal discharge and vaginal pain.   Neurological: Negative for headaches.        OBJECTIVE:     Physical Exam:  Physical Exam   Constitutional: She is oriented to person, place, and time. She appears well-developed and well-nourished. No distress.   Cardiovascular: Normal rate and normal heart sounds.   Pulmonary/Chest:  Effort normal.   Musculoskeletal: Normal range of motion.   Neurological: She is alert and oriented to person, place, and time.   Skin: Skin is warm and dry.   Psychiatric: She has a normal mood and affect.   Nursing note and vitals reviewed.    ASSESSMENT:       ICD-10-CM ICD-9-CM    1. Abnormal uterine bleeding (AUB) N93.9 626.9 Vital signs      [place] to [place]      POCT glucose      Notify physician if BS > 180 for hysterectomy patients      Chlorhexidine (CHG) 2% Wipes      Notify Physician/Vital Signs Parameters Urine output less than 0.5mL/kg/hr (with indwelling catheter) or 30 mL/hr (without indwelling catheter) or blood glucose greater than 200 mg/dL      Notify physician      Notify Physician - Potential Need of Opioid Reversal      Chlorohexidine Gluconate Bath      Case Request Operating Room: HYSTEROSCOPY, WITH DILATION AND CURETTAGE OF UTERUS      Full code      Place in Outpatient      Diet NPO      Place sequential compression device      Place MARCIO hose      Notify Physician - Potential Need of Opioid Reversal      Full code      Place sequential compression device      Place MARCIO hose      EKG 12-lead      X-Ray Chest 1 View Pre-OP      Ambulatory consult to Anesthesiology   2. Endometrial hyperplasia with atypia N85.02 621.33       Plan:      Hilary was seen today for follow-up.    Diagnoses and all orders for this visit:    Abnormal uterine bleeding (AUB)  -     Vital signs; Standing  -     [place] to [place]; Standing  -     POCT glucose; Standing  -     Notify physician if BS > 180 for hysterectomy patients; Standing  -     Chlorhexidine (CHG) 2% Wipes; Standing  -     Notify Physician/Vital Signs Parameters Urine output less than 0.5mL/kg/hr (with indwelling catheter) or 30 mL/hr (without indwelling catheter) or blood glucose greater than 200 mg/dL; Standing  -     Notify physician; Standing  -     Notify Physician - Potential Need of Opioid Reversal; Standing  -     Chlorohexidine Gluconate Bath;  Standing  -     Case Request Operating Room: HYSTEROSCOPY, WITH DILATION AND CURETTAGE OF UTERUS  -     Full code; Standing  -     Place in Outpatient; Standing  -     Diet NPO; Standing  -     Place sequential compression device; Standing  -     Place MARCIO hose; Standing  -     Notify Physician - Potential Need of Opioid Reversal  -     Full code  -     Place sequential compression device  -     Place MARCIO hose  -     EKG 12-lead; Future  -     X-Ray Chest 1 View Pre-OP; Future  -     Ambulatory consult to Anesthesiology    Endometrial hyperplasia with atypia  - DIscussed     Other orders  -     IP VTE LOW RISK PATIENT; Standing  -     Progressive Mobility Protocol (mobilize patient to their highest level of functioning at least twice daily); Standing  -     IP VTE LOW RISK PATIENT      Patient is to have Hysteroscopy D&C for abnormal uterine bleeding/endometrial hyperplasia on 01/25/2018  - Labs: not obtained  - CXR/EKG: ordered, but not yet obtained  - PAP: Normal  - TVUS reviewed  - EMBX endometrial hyperplasia  - Medical clearance required: Anesthesiology preop consult placed   - Case request placed & pre-op orders completed  - Consents signed today  - Anticoagulation : Patient is not on antiocoagulation.     Orders Placed This Encounter   Procedures    X-Ray Chest 1 View Pre-OP    Ambulatory consult to Anesthesiology    Notify Physician - Potential Need of Opioid Reversal    Place sequential compression device    Place MARCIO hose    Full code    EKG 12-lead       Follow-up in about 6 weeks (around 2/28/2019) for postop hysteroscopy D&c.     Counseling time: 30 minutes    Elías Nolasco

## 2019-01-17 NOTE — H&P (VIEW-ONLY)
History & Physical  Gynecology      SUBJECTIVE:     Chief Complaint: Follow-up (discuss results )       History of Present Illness:  Ms. Wilson is a 63 year old female who presented to clinic c/o vaginal bleeding x 3 weeks. She reported that she would stop for a day or so but mostly has been constant. She reported that she uses 2 pads at one time. She reported that she changes the pads 3 times a day. She reported that she is passing blood clots up to the size of a golf ball. She also reported that she  she recalls soiling her clothing. She denies hot flashes, night sweats, and a 12 month period of no menses. She reported that before this episode she would have normal menses monthly for 2-3 days. She reported that her periods only stopped for 3 months a few years ago.     TVUS was done and EMS was thickened. EMBx was then done with a small sample size but atypical endometrial hyperplasia was found. The patient presents today to discuss results.    FINAL PATHOLOGIC DIAGNOSIS  Endometrial biopsy:  - A fragment of atypical endometrial hyperplasia among blood clots .  Note: Due to the small size of the sampling, a high-grade lesion cannot be completely ruled out. Re-biopsy or  clinical and imaging correlation is recommended.    Review of patient's allergies indicates:  No Known Allergies    Past Medical History:   Diagnosis Date    Asthma     Hyperlipidemia     Hypertension     JOSUÉ on CPAP      Past Surgical History:   Procedure Laterality Date    BREAST BIOPSY Left     COLONOSCOPY N/A 11/3/2016    Performed by Zhang Diez [facility] at [facility] END[facility]    mass removal      TUBAL LIGATION       OB History      Para Term  AB Living    3 3 3     3    SAB TAB Ectopic Multiple Live Births                     Family History   Problem Relation Age of Onset    Hypertension Mother     Kidney disease Mother     Diabetes Father     Stroke Father     Heart disease Sister 49    Kidney disease Sister      Stroke Sister         in 50s     Social History     Tobacco Use    Smoking status: Never Smoker    Smokeless tobacco: Never Used   Substance Use Topics    Alcohol use: No    Drug use: No       Current Outpatient Medications   Medication Sig    albuterol (PROAIR HFA) 90 mcg/actuation inhaler Inhale 2 puffs into the lungs every 6 (six) hours as needed for Wheezing.    etodolac (LODINE) 400 MG tablet Take 1 tablet (400 mg total) by mouth 2 (two) times daily.    furosemide (LASIX) 20 MG tablet Take 1 tablet (20 mg total) by mouth once daily.    hydroCHLOROthiazide (HYDRODIURIL) 25 MG tablet Take 1 tablet (25 mg total) by mouth once daily.    ibuprofen (ADVIL,MOTRIN) 800 MG tablet Take 1 tablet (800 mg total) by mouth 3 (three) times daily.    mupirocin (BACTROBAN) 2 % ointment Apply topically 3 (three) times daily.    phentermine 15 MG capsule Take 1 capsule (15 mg total) by mouth every morning.    [START ON 2/11/2019] phentermine 15 MG capsule Take 1 capsule (15 mg total) by mouth every morning.    potassium chloride (MICRO-K) 8 mEq CpSR Take 1 capsule (8 mEq total) by mouth once daily.     No current facility-administered medications for this visit.        Review of Systems:  Review of Systems   Constitutional: Negative for chills and fever.   Eyes: Negative for visual disturbance.   Respiratory: Negative for cough and wheezing.    Cardiovascular: Negative for chest pain and palpitations.   Gastrointestinal: Negative for abdominal pain, nausea and vomiting.   Genitourinary: Positive for postmenopausal bleeding. Negative for dysuria, frequency, hematuria, pelvic pain, vaginal bleeding, vaginal discharge and vaginal pain.   Neurological: Negative for headaches.        OBJECTIVE:     Physical Exam:  Physical Exam   Constitutional: She is oriented to person, place, and time. She appears well-developed and well-nourished. No distress.   Cardiovascular: Normal rate and normal heart sounds.   Pulmonary/Chest:  Effort normal.   Musculoskeletal: Normal range of motion.   Neurological: She is alert and oriented to person, place, and time.   Skin: Skin is warm and dry.   Psychiatric: She has a normal mood and affect.   Nursing note and vitals reviewed.    ASSESSMENT:       ICD-10-CM ICD-9-CM    1. Abnormal uterine bleeding (AUB) N93.9 626.9 Vital signs      Espinosa to [place]      POCT glucose      Notify physician if BS > 180 for hysterectomy patients      Chlorhexidine (CHG) 2% Wipes      Notify Physician/Vital Signs Parameters Urine output less than 0.5mL/kg/hr (with indwelling catheter) or 30 mL/hr (without indwelling catheter) or blood glucose greater than 200 mg/dL      Notify physician      Notify Physician - Potential Need of Opioid Reversal      Chlorohexidine Gluconate Bath      Case Request Operating Room: HYSTEROSCOPY, WITH DILATION AND CURETTAGE OF UTERUS      Full code      Place in Outpatient      Diet NPO      Place sequential compression device      Place MARCIO hose      Notify Physician - Potential Need of Opioid Reversal      Full code      Place sequential compression device      Place MARCIO hose      EKG 12-lead      X-Ray Chest 1 View Pre-OP      Ambulatory consult to Anesthesiology   2. Endometrial hyperplasia with atypia N85.02 621.33       Plan:      Hilary was seen today for follow-up.    Diagnoses and all orders for this visit:    Abnormal uterine bleeding (AUB)  -     Vital signs; Standing  -     Espinosa to Gravity; Standing  -     POCT glucose; Standing  -     Notify physician if BS > 180 for hysterectomy patients; Standing  -     Chlorhexidine (CHG) 2% Wipes; Standing  -     Notify Physician/Vital Signs Parameters Urine output less than 0.5mL/kg/hr (with indwelling catheter) or 30 mL/hr (without indwelling catheter) or blood glucose greater than 200 mg/dL; Standing  -     Notify physician; Standing  -     Notify Physician - Potential Need of Opioid Reversal; Standing  -     Chlorohexidine Gluconate Bath;  Standing  -     Case Request Operating Room: HYSTEROSCOPY, WITH DILATION AND CURETTAGE OF UTERUS  -     Full code; Standing  -     Place in Outpatient; Standing  -     Diet NPO; Standing  -     Place sequential compression device; Standing  -     Place MARCIO hose; Standing  -     Notify Physician - Potential Need of Opioid Reversal  -     Full code  -     Place sequential compression device  -     Place MARCIO hose  -     EKG 12-lead; Future  -     X-Ray Chest 1 View Pre-OP; Future  -     Ambulatory consult to Anesthesiology    Endometrial hyperplasia with atypia  - DIscussed     Other orders  -     IP VTE LOW RISK PATIENT; Standing  -     Progressive Mobility Protocol (mobilize patient to their highest level of functioning at least twice daily); Standing  -     IP VTE LOW RISK PATIENT      Patient is to have Hysteroscopy D&C for abnormal uterine bleeding/endometrial hyperplasia on 01/25/2018  - Labs: not obtained  - CXR/EKG: ordered, but not yet obtained  - PAP: Normal  - TVUS reviewed  - EMBX endometrial hyperplasia  - Medical clearance required: Anesthesiology preop consult placed   - Case request placed & pre-op orders completed  - Consents signed today  - Anticoagulation : Patient is not on antiocoagulation.     Orders Placed This Encounter   Procedures    X-Ray Chest 1 View Pre-OP    Ambulatory consult to Anesthesiology    Notify Physician - Potential Need of Opioid Reversal    Place sequential compression device    Place MARCIO hose    Full code    EKG 12-lead       Follow-up in about 6 weeks (around 2/28/2019) for postop hysteroscopy D&c.     Counseling time: 30 minutes    Elías Nolasco

## 2019-01-18 NOTE — PATIENT INSTRUCTIONS
Understanding Uterine Bleeding  Your uterine bleeding may be heavy. Or you may have bleeding between periods. These problems may be caused by hormonal imbalance. Or they can be caused by uterine growths, an intrauterine device (IUD), bleeding disorder, or pregnancy.  Hormonal imbalance  Your menstrual cycle is controlled by hormones. The hormones include estrogen and progesterone. Sometimes there is too much or too little of 1 or both of these hormones. This can cause heavy periods. Or it can cause bleeding between periods. Causes of hormonal imbalance can include:  · Hormonal changes in teens and in women nearing menopause  · Diabetes, thyroid disease, or other medical problems  · Obesity  · Stress  · Strenuous exercise  · Anorexia (an eating disorder)  · Pregnancy  Uterine growths  There are different kinds of uterine growths. These include:  · Fibroids. These are round knots of muscle tissue in the uterus.  · Polyps. These are soft tissue growths in the uterine lining. They often hang into the uterus.  · Adenomyosis. This is when the uterine lining grows into the muscle wall.  · Hyperplasia. This is when the uterine lining gets too thick or grows too much.  · Endometrial cancer. This is uncontrolled growth of part of the uterine lining.  Other causes of uterine bleeding  There are other causes of uterine bleeding. These include:  · IUD (intrauterine device). This is a method of birth control. Some IUDs contain hormones.  · Bleeding disorders. This is when the blood can't clot properly.  Treatment  Your health care provider can help diagnose the cause of your bleeding problem. He or she will work then work with you to plan treatment as needed.  Date Last Reviewed: 7/6/2015  © 1699-8650 [employer] [address] [place] [address], [place] [address]. All rights reserved. This information is not intended as a substitute for professional medical care. Always follow your healthcare professional's  instructions.

## 2019-01-22 ENCOUNTER — HOSPITAL ENCOUNTER (OUTPATIENT)
Dept: PREADMISSION TESTING | Facility: HOSPITAL | Age: 64
Discharge: HOME | End: 2019-01-22
Attending: OBSTETRICS & GYNECOLOGY
Payer: COMMERCIAL

## 2019-01-22 ENCOUNTER — HOSPITAL ENCOUNTER (OUTPATIENT)
Dept: RADIOLOGY | Facility: HOSPITAL | Age: 64
Discharge: HOME | End: 2019-01-22
Attending: OBSTETRICS & GYNECOLOGY
Payer: COMMERCIAL

## 2019-01-22 VITALS
HEART RATE: 91 BPM | RESPIRATION RATE: 18 BRPM | SYSTOLIC BLOOD PRESSURE: 126 MMHG | WEIGHT: 293 LBS | OXYGEN SATURATION: 97 % | BODY MASS INDEX: 53.92 KG/M2 | DIASTOLIC BLOOD PRESSURE: 80 MMHG | HEIGHT: 62 IN | TEMPERATURE: 97 F

## 2019-01-22 DIAGNOSIS — Z01.818 PRE-OP TESTING: Primary | ICD-10-CM

## 2019-01-22 DIAGNOSIS — N93.9 ABNORMAL UTERINE BLEEDING (AUB): ICD-10-CM

## 2019-01-22 LAB
ANION GAP SERPL CALC-SCNC: 8 MMOL/L
BASOPHILS # BLD AUTO: 0.01 K/UL
BASOPHILS NFR BLD: 0.1 %
BUN SERPL-MCNC: 12 MG/DL
CALCIUM SERPL-MCNC: 9.6 MG/DL
CHLORIDE SERPL-SCNC: 100 MMOL/L
CO2 SERPL-SCNC: 30 MMOL/L
CREAT SERPL-MCNC: 0.8 MG/DL
DIFFERENTIAL METHOD: ABNORMAL
EOSINOPHIL # BLD AUTO: 0.1 K/UL
EOSINOPHIL NFR BLD: 1 %
ERYTHROCYTE [DISTWIDTH] IN BLOOD BY AUTOMATED COUNT: 14.4 %
EST. GFR  (AFRICAN AMERICAN): >60 ML/MIN/1.73 M^2
EST. GFR  (NON AFRICAN AMERICAN): >60 ML/MIN/1.73 M^2
GLUCOSE SERPL-MCNC: 114 MG/DL
HCT VFR BLD AUTO: 41.2 %
HGB BLD-MCNC: 13.3 G/DL
LYMPHOCYTES # BLD AUTO: 1.5 K/UL
LYMPHOCYTES NFR BLD: 18.2 %
MCH RBC QN AUTO: 26.9 PG
MCHC RBC AUTO-ENTMCNC: 32.3 G/DL
MCV RBC AUTO: 83 FL
MONOCYTES # BLD AUTO: 0.8 K/UL
MONOCYTES NFR BLD: 9.4 %
NEUTROPHILS # BLD AUTO: 5.9 K/UL
NEUTROPHILS NFR BLD: 71.3 %
PLATELET # BLD AUTO: 230 K/UL
PMV BLD AUTO: 11.1 FL
POTASSIUM SERPL-SCNC: 3.8 MMOL/L
RBC # BLD AUTO: 4.94 M/UL
SODIUM SERPL-SCNC: 138 MMOL/L
WBC # BLD AUTO: 8.23 K/UL

## 2019-01-22 PROCEDURE — 93010 EKG 12-LEAD: ICD-10-PCS | Mod: ,,, | Performed by: INTERNAL MEDICINE

## 2019-01-22 PROCEDURE — 71046 XR CHEST PA AND LATERAL PRE-OP: ICD-10-PCS | Mod: 26,,, | Performed by: RADIOLOGY

## 2019-01-22 PROCEDURE — 93005 ELECTROCARDIOGRAM TRACING: CPT

## 2019-01-22 PROCEDURE — 36415 COLL VENOUS BLD VENIPUNCTURE: CPT

## 2019-01-22 PROCEDURE — 71046 X-RAY EXAM CHEST 2 VIEWS: CPT | Mod: TC,FY

## 2019-01-22 PROCEDURE — 85025 COMPLETE CBC W/AUTO DIFF WBC: CPT

## 2019-01-22 PROCEDURE — 71046 X-RAY EXAM CHEST 2 VIEWS: CPT | Mod: 26,,, | Performed by: RADIOLOGY

## 2019-01-22 PROCEDURE — 80048 BASIC METABOLIC PNL TOTAL CA: CPT

## 2019-01-22 PROCEDURE — 93010 ELECTROCARDIOGRAM REPORT: CPT | Mod: ,,, | Performed by: INTERNAL MEDICINE

## 2019-01-22 NOTE — DISCHARGE INSTRUCTIONS
Your surgery is scheduled for _Friday Jan. 25,2019_.    Call [phone] between 2 p.m. and 5 p.m. on   __Thursday__ to find out your arrival time for the day of your surgery.      Please report to SAME DAY SURGERY UNIT on the 2nd FLOOR at _______ a.m.  Use front door entrance. The doors open at 0530 am.    INSTRUCTIONS IMPORTANT!!!  ¨ Do not eat or drink after 12 midnight-including water. OK to brush teeth, no   gum, candy or mints!            _x___  Prep instructions:    SHOWER    ____  Please shower using Hibiclens soap the night before AND  the morning of your surgery/procedure. Do not use Hibiclens on your face or genitals               If your surgery is around your belly button (Navel) be sure to wash inside your  belly button also. Rinse hibiclens off completely.  _x___  No shaving of procedural area at least 4-5 days before surgery due to increased risk of skin irritation and/or possible infection.  _x___  Do not wear makeup, including mascara. WEARING EYE MAKEUP MAY  LEAD TO SERIOUS EYE INJURY during surgery.  _x___  No powder, lotions or creams to your body.  _x___  You may wear only deodorant on the day of surgery.  _x___  Please remove all jewelry, including piercings and leave at home.  _x___  No money or valuables needed. Please leave at home.  You may bring your  cell phone.  ____  Please bring any documents given by your doctor.  _x___  If going home the same day, arrange for a ride home. You will not be able to drive if Anesthesia was used.  ____  Children under 18 years require a parent / guardian present the entire time   they are in surgery / recovery.  __x__  Wear loose fitting clothing. Allow for dressings, bandages.  __x__  Stop Aspirin, Ibuprofen, Motrin and Aleve at least 3-5 days before surgery, unless otherwise instructed by your doctor, or the nurse.              You MAY use Tylenol/acetaminophen until day of surgery.  ____  If you take diabetic medication, do not take am of surgery  unless instructed by Doctor.  __x__  Call MD for temperature above 101 degrees.        __x__ Stop taking any Fish Oil supplement or any Vitamins that contain Vitamin  E at least 5 days prior to surgery.          I have read or had read and explained to me, and understand the above information.  Additional comments or instructions:Please call   [phone] if you have any questions regarding the instructions above.

## 2019-01-24 ENCOUNTER — ANESTHESIA EVENT (OUTPATIENT)
Dept: SURGERY | Facility: HOSPITAL | Age: 64
End: 2019-01-24
Payer: COMMERCIAL

## 2019-01-25 ENCOUNTER — ANESTHESIA (OUTPATIENT)
Dept: SURGERY | Facility: HOSPITAL | Age: 64
End: 2019-01-25
Payer: COMMERCIAL

## 2019-01-25 ENCOUNTER — HOSPITAL ENCOUNTER (OUTPATIENT)
Facility: HOSPITAL | Age: 64
Discharge: HOME | End: 2019-01-25
Attending: OBSTETRICS & GYNECOLOGY | Admitting: OBSTETRICS & GYNECOLOGY
Payer: COMMERCIAL

## 2019-01-25 VITALS
BODY MASS INDEX: 53.92 KG/M2 | HEIGHT: 62 IN | WEIGHT: 293 LBS | DIASTOLIC BLOOD PRESSURE: 70 MMHG | HEART RATE: 90 BPM | SYSTOLIC BLOOD PRESSURE: 148 MMHG | TEMPERATURE: 98 F | RESPIRATION RATE: 15 BRPM | OXYGEN SATURATION: 100 %

## 2019-01-25 DIAGNOSIS — N93.9 ABNORMAL UTERINE BLEEDING (AUB): ICD-10-CM

## 2019-01-25 DIAGNOSIS — Z98.890 STATUS POST HYSTEROSCOPY: Primary | ICD-10-CM

## 2019-01-25 DIAGNOSIS — N85.02 ENDOMETRIAL HYPERPLASIA WITH ATYPIA: ICD-10-CM

## 2019-01-25 PROCEDURE — 82962 GLUCOSE BLOOD TEST: CPT | Performed by: OBSTETRICS & GYNECOLOGY

## 2019-01-25 PROCEDURE — 58558 PR HYSTEROSCOPY,W/ENDO BX: ICD-10-PCS | Mod: ,,, | Performed by: OBSTETRICS & GYNECOLOGY

## 2019-01-25 PROCEDURE — C1782 MORCELLATOR: HCPCS | Performed by: OBSTETRICS & GYNECOLOGY

## 2019-01-25 PROCEDURE — 63600175 PHARM REV CODE 636 W HCPCS: Performed by: ANESTHESIOLOGY

## 2019-01-25 PROCEDURE — 36000706: Performed by: OBSTETRICS & GYNECOLOGY

## 2019-01-25 PROCEDURE — 25000003 PHARM REV CODE 250: Performed by: NURSE ANESTHETIST, CERTIFIED REGISTERED

## 2019-01-25 PROCEDURE — 37000009 HC ANESTHESIA EA ADD 15 MINS: Performed by: OBSTETRICS & GYNECOLOGY

## 2019-01-25 PROCEDURE — 63600175 PHARM REV CODE 636 W HCPCS: Performed by: NURSE ANESTHETIST, CERTIFIED REGISTERED

## 2019-01-25 PROCEDURE — D9220A PRA ANESTHESIA: ICD-10-PCS | Mod: CRNA,,, | Performed by: NURSE ANESTHETIST, CERTIFIED REGISTERED

## 2019-01-25 PROCEDURE — 25000003 PHARM REV CODE 250: Performed by: ANESTHESIOLOGY

## 2019-01-25 PROCEDURE — 71000033 HC RECOVERY, INTIAL HOUR: Performed by: OBSTETRICS & GYNECOLOGY

## 2019-01-25 PROCEDURE — 58558 HYSTEROSCOPY BIOPSY: CPT | Mod: ,,, | Performed by: OBSTETRICS & GYNECOLOGY

## 2019-01-25 PROCEDURE — D9220A PRA ANESTHESIA: ICD-10-PCS | Mod: ANES,,, | Performed by: ANESTHESIOLOGY

## 2019-01-25 PROCEDURE — D9220A PRA ANESTHESIA: Mod: CRNA,,, | Performed by: NURSE ANESTHETIST, CERTIFIED REGISTERED

## 2019-01-25 PROCEDURE — 88305 TISSUE EXAM BY PATHOLOGIST: CPT | Performed by: PATHOLOGY

## 2019-01-25 PROCEDURE — D9220A PRA ANESTHESIA: Mod: ANES,,, | Performed by: ANESTHESIOLOGY

## 2019-01-25 PROCEDURE — 37000008 HC ANESTHESIA 1ST 15 MINUTES: Performed by: OBSTETRICS & GYNECOLOGY

## 2019-01-25 PROCEDURE — 88305 TISSUE EXAM BY PATHOLOGIST: CPT | Mod: 26,,, | Performed by: PATHOLOGY

## 2019-01-25 PROCEDURE — 71000015 HC POSTOP RECOV 1ST HR: Performed by: OBSTETRICS & GYNECOLOGY

## 2019-01-25 PROCEDURE — 88305 TISSUE SPECIMEN TO PATHOLOGY - SURGERY: ICD-10-PCS | Mod: 26,,, | Performed by: PATHOLOGY

## 2019-01-25 PROCEDURE — 36000707: Performed by: OBSTETRICS & GYNECOLOGY

## 2019-01-25 PROCEDURE — 27201423 OPTIME MED/SURG SUP & DEVICES STERILE SUPPLY: Performed by: OBSTETRICS & GYNECOLOGY

## 2019-01-25 PROCEDURE — 25000242 PHARM REV CODE 250 ALT 637 W/ HCPCS: Performed by: ANESTHESIOLOGY

## 2019-01-25 RX ORDER — IBUPROFEN 800 MG/1
800 TABLET ORAL EVERY 8 HOURS PRN
Qty: 40 TABLET | Refills: 0 | Status: SHIPPED | OUTPATIENT
Start: 2019-01-25 | End: 2022-05-30 | Stop reason: SDUPTHER

## 2019-01-25 RX ORDER — HYDROCODONE BITARTRATE AND ACETAMINOPHEN 10; 325 MG/1; MG/1
1 TABLET ORAL EVERY 4 HOURS PRN
Status: DISCONTINUED | OUTPATIENT
Start: 2019-01-25 | End: 2019-01-25 | Stop reason: HOSPADM

## 2019-01-25 RX ORDER — FENTANYL CITRATE 50 UG/ML
25 INJECTION, SOLUTION INTRAMUSCULAR; INTRAVENOUS EVERY 5 MIN PRN
Status: DISCONTINUED | OUTPATIENT
Start: 2019-01-25 | End: 2019-01-25 | Stop reason: HOSPADM

## 2019-01-25 RX ORDER — FENTANYL CITRATE 50 UG/ML
INJECTION, SOLUTION INTRAMUSCULAR; INTRAVENOUS
Status: DISCONTINUED | OUTPATIENT
Start: 2019-01-25 | End: 2019-01-25

## 2019-01-25 RX ORDER — LIDOCAINE HCL/PF 100 MG/5ML
SYRINGE (ML) INTRAVENOUS
Status: DISCONTINUED | OUTPATIENT
Start: 2019-01-25 | End: 2019-01-25

## 2019-01-25 RX ORDER — ONDANSETRON 2 MG/ML
INJECTION INTRAMUSCULAR; INTRAVENOUS
Status: DISCONTINUED | OUTPATIENT
Start: 2019-01-25 | End: 2019-01-25

## 2019-01-25 RX ORDER — SODIUM CHLORIDE 0.9 % (FLUSH) 0.9 %
3 SYRINGE (ML) INJECTION
Status: DISCONTINUED | OUTPATIENT
Start: 2019-01-25 | End: 2019-01-25 | Stop reason: HOSPADM

## 2019-01-25 RX ORDER — NEOSTIGMINE METHYLSULFATE 1 MG/ML
INJECTION, SOLUTION INTRAVENOUS
Status: DISCONTINUED | OUTPATIENT
Start: 2019-01-25 | End: 2019-01-25

## 2019-01-25 RX ORDER — SUCCINYLCHOLINE CHLORIDE 20 MG/ML
INJECTION INTRAMUSCULAR; INTRAVENOUS
Status: DISCONTINUED | OUTPATIENT
Start: 2019-01-25 | End: 2019-01-25

## 2019-01-25 RX ORDER — ONDANSETRON 4 MG/1
8 TABLET, ORALLY DISINTEGRATING ORAL EVERY 8 HOURS PRN
Status: DISCONTINUED | OUTPATIENT
Start: 2019-01-25 | End: 2019-01-25 | Stop reason: HOSPADM

## 2019-01-25 RX ORDER — IBUPROFEN 400 MG/1
800 TABLET ORAL EVERY 6 HOURS PRN
Status: DISCONTINUED | OUTPATIENT
Start: 2019-01-25 | End: 2019-01-25 | Stop reason: HOSPADM

## 2019-01-25 RX ORDER — HYDROMORPHONE HYDROCHLORIDE 2 MG/ML
0.2 INJECTION, SOLUTION INTRAMUSCULAR; INTRAVENOUS; SUBCUTANEOUS EVERY 5 MIN PRN
Status: DISCONTINUED | OUTPATIENT
Start: 2019-01-25 | End: 2019-01-25 | Stop reason: HOSPADM

## 2019-01-25 RX ORDER — HYDROCODONE BITARTRATE AND ACETAMINOPHEN 5; 325 MG/1; MG/1
1 TABLET ORAL EVERY 4 HOURS PRN
Status: DISCONTINUED | OUTPATIENT
Start: 2019-01-25 | End: 2019-01-25 | Stop reason: HOSPADM

## 2019-01-25 RX ORDER — IPRATROPIUM BROMIDE AND ALBUTEROL SULFATE 2.5; .5 MG/3ML; MG/3ML
3 SOLUTION RESPIRATORY (INHALATION)
Status: COMPLETED | OUTPATIENT
Start: 2019-01-25 | End: 2019-01-25

## 2019-01-25 RX ORDER — MIDAZOLAM HYDROCHLORIDE 1 MG/ML
INJECTION, SOLUTION INTRAMUSCULAR; INTRAVENOUS
Status: DISCONTINUED | OUTPATIENT
Start: 2019-01-25 | End: 2019-01-25

## 2019-01-25 RX ORDER — METOCLOPRAMIDE HYDROCHLORIDE 5 MG/ML
INJECTION INTRAMUSCULAR; INTRAVENOUS
Status: DISCONTINUED | OUTPATIENT
Start: 2019-01-25 | End: 2019-01-25

## 2019-01-25 RX ORDER — PHENYLEPHRINE HYDROCHLORIDE 10 MG/ML
INJECTION INTRAVENOUS
Status: DISCONTINUED | OUTPATIENT
Start: 2019-01-25 | End: 2019-01-25

## 2019-01-25 RX ORDER — ROCURONIUM BROMIDE 10 MG/ML
INJECTION, SOLUTION INTRAVENOUS
Status: DISCONTINUED | OUTPATIENT
Start: 2019-01-25 | End: 2019-01-25

## 2019-01-25 RX ORDER — DIPHENHYDRAMINE HYDROCHLORIDE 50 MG/ML
25 INJECTION INTRAMUSCULAR; INTRAVENOUS EVERY 4 HOURS PRN
Status: DISCONTINUED | OUTPATIENT
Start: 2019-01-25 | End: 2019-01-25 | Stop reason: HOSPADM

## 2019-01-25 RX ORDER — ONDANSETRON 2 MG/ML
4 INJECTION INTRAMUSCULAR; INTRAVENOUS EVERY 6 HOURS PRN
Status: DISCONTINUED | OUTPATIENT
Start: 2019-01-25 | End: 2019-01-25 | Stop reason: HOSPADM

## 2019-01-25 RX ORDER — GLYCOPYRROLATE 0.2 MG/ML
INJECTION INTRAMUSCULAR; INTRAVENOUS
Status: DISCONTINUED | OUTPATIENT
Start: 2019-01-25 | End: 2019-01-25

## 2019-01-25 RX ORDER — SODIUM CHLORIDE, SODIUM LACTATE, POTASSIUM CHLORIDE, CALCIUM CHLORIDE 600; 310; 30; 20 MG/100ML; MG/100ML; MG/100ML; MG/100ML
INJECTION, SOLUTION INTRAVENOUS CONTINUOUS
Status: DISCONTINUED | OUTPATIENT
Start: 2019-01-25 | End: 2019-01-25 | Stop reason: HOSPADM

## 2019-01-25 RX ORDER — PROPOFOL 10 MG/ML
VIAL (ML) INTRAVENOUS
Status: DISCONTINUED | OUTPATIENT
Start: 2019-01-25 | End: 2019-01-25

## 2019-01-25 RX ORDER — HYDROCODONE BITARTRATE AND ACETAMINOPHEN 5; 325 MG/1; MG/1
1 TABLET ORAL EVERY 4 HOURS PRN
Qty: 14 TABLET | Refills: 0 | Status: SHIPPED | OUTPATIENT
Start: 2019-01-25 | End: 2019-10-09

## 2019-01-25 RX ORDER — LIDOCAINE HYDROCHLORIDE 10 MG/ML
1 INJECTION, SOLUTION EPIDURAL; INFILTRATION; INTRACAUDAL; PERINEURAL ONCE
Status: DISCONTINUED | OUTPATIENT
Start: 2019-01-25 | End: 2019-01-25 | Stop reason: HOSPADM

## 2019-01-25 RX ORDER — DIPHENHYDRAMINE HCL 25 MG
25 CAPSULE ORAL EVERY 4 HOURS PRN
Status: DISCONTINUED | OUTPATIENT
Start: 2019-01-25 | End: 2019-01-25 | Stop reason: HOSPADM

## 2019-01-25 RX ADMIN — SUCCINYLCHOLINE CHLORIDE 120 MG: 20 INJECTION, SOLUTION INTRAMUSCULAR; INTRAVENOUS at 11:01

## 2019-01-25 RX ADMIN — METOCLOPRAMIDE 10 MG: 5 INJECTION, SOLUTION INTRAMUSCULAR; INTRAVENOUS at 11:01

## 2019-01-25 RX ADMIN — MIDAZOLAM HYDROCHLORIDE 2 MG: 1 INJECTION, SOLUTION INTRAMUSCULAR; INTRAVENOUS at 11:01

## 2019-01-25 RX ADMIN — NEOSTIGMINE METHYLSULFATE 5 MG: 1 INJECTION INTRAVENOUS at 12:01

## 2019-01-25 RX ADMIN — PHENYLEPHRINE HYDROCHLORIDE 100 MCG: 10 INJECTION INTRAVENOUS at 12:01

## 2019-01-25 RX ADMIN — SODIUM CHLORIDE, SODIUM LACTATE, POTASSIUM CHLORIDE, AND CALCIUM CHLORIDE: .6; .31; .03; .02 INJECTION, SOLUTION INTRAVENOUS at 10:01

## 2019-01-25 RX ADMIN — LIDOCAINE HYDROCHLORIDE 80 MG: 20 INJECTION, SOLUTION INTRAVENOUS at 11:01

## 2019-01-25 RX ADMIN — ONDANSETRON 4 MG: 2 INJECTION, SOLUTION INTRAMUSCULAR; INTRAVENOUS at 11:01

## 2019-01-25 RX ADMIN — ROCURONIUM BROMIDE 5 MG: 10 INJECTION, SOLUTION INTRAVENOUS at 11:01

## 2019-01-25 RX ADMIN — IPRATROPIUM BROMIDE AND ALBUTEROL SULFATE 3 ML: .5; 3 SOLUTION RESPIRATORY (INHALATION) at 11:01

## 2019-01-25 RX ADMIN — GLYCOPYRROLATE 0.6 MG: 0.2 INJECTION, SOLUTION INTRAMUSCULAR; INTRAVENOUS at 12:01

## 2019-01-25 RX ADMIN — PHENYLEPHRINE HYDROCHLORIDE 200 MCG: 10 INJECTION INTRAVENOUS at 12:01

## 2019-01-25 RX ADMIN — FENTANYL CITRATE 50 MCG: 50 INJECTION INTRAMUSCULAR; INTRAVENOUS at 11:01

## 2019-01-25 RX ADMIN — PROPOFOL 120 MG: 10 INJECTION, EMULSION INTRAVENOUS at 11:01

## 2019-01-25 RX ADMIN — Medication 10 MG: at 11:01

## 2019-01-25 RX ADMIN — ROCURONIUM BROMIDE 20 MG: 10 INJECTION, SOLUTION INTRAVENOUS at 11:01

## 2019-01-25 RX ADMIN — HYDROMORPHONE HYDROCHLORIDE 0.2 MG: 2 INJECTION, SOLUTION INTRAMUSCULAR; INTRAVENOUS; SUBCUTANEOUS at 01:01

## 2019-01-25 NOTE — TRANSFER OF CARE
"Anesthesia Transfer of Care Note    Patient: Hilary Wilson    Procedure(s) Performed: Procedure(s) (LRB):  HYSTEROSCOPY, WITH DILATION AND CURETTAGE OF UTERUS (N/A)    Patient location: PACU    Anesthesia Type: general    Transport from OR: Transported from OR on room air with adequate spontaneous ventilation    Post pain: adequate analgesia    Post assessment: no apparent anesthetic complications and tolerated procedure well    Post vital signs: stable    Level of consciousness: awake, alert and oriented    Nausea/Vomiting: no nausea/vomiting    Complications: none    Transfer of care protocol was followed      Last vitals:   Visit Vitals  BP (!) 156/66 (BP Location: Right arm)   Pulse 70   Temp 36.3 °C (97.3 °F) (Oral)   Resp 17   Ht 5' 2" (1.575 m)   Wt (!) 151.7 kg (334 lb 7 oz)   LMP 05/23/2014   SpO2 100%   Breastfeeding? No   BMI 61.17 kg/m²     "

## 2019-01-25 NOTE — INTERVAL H&P NOTE
The patient has been examined and the H&P has been reviewed:    I concur with the findings and no changes have occurred since H&P was written.    Surgery risks, benefits and alternative options discussed and understood by patient/family. Procedure was confirmed with Ms. Wilson. She confirmed Hysteroscopy D&C. Patient denies any questions or concerns at this time. Consents in chart and Staff notified. Patient can proceed to OR.        Active Hospital Problems    Diagnosis  POA    *Endometrial hyperplasia with atypia [N85.02]  Yes      Resolved Hospital Problems   No resolved problems to display.

## 2019-01-25 NOTE — PLAN OF CARE
Problem: Adult Inpatient Plan of Care  Goal: Plan of Care Review  Outcome: Ongoing (interventions implemented as appropriate)   01/25/19 1402   Plan of Care Review   Plan of Care Reviewed With patient;family     Meryl 10/10. AAOx4. VSS per flow sheet. Denies pain. Devi pad in place; initial post op bleeding has slowed. See chart for full assessment. Hand off report to Ignacia ROSS RN SDS.    Problem: Bleeding (Surgery Nonspecified)  Goal: Absence of Bleeding  Outcome: Ongoing (interventions implemented as appropriate)  Intervention: Monitor and Manage Bleeding   01/25/19 1402   Prevent and Manage Risk of Hemorrhage   Bleeding Management dressing monitored         Problem: Ongoing Anesthesia Effects (Surgery Nonspecified)  Goal: Anesthesia/Sedation Recovery  Outcome: Outcome(s) achieved Date Met: 01/25/19  Intervention: Optimize Anesthesia Recovery   01/25/19 1402   Optimize Anesthesia Recovery   Anesthesia/Sedation Recovery progressing toward baseline;criteria met for transfer   Optimize Balance and Safe Activity   Safety Promotion/Fall Prevention medications reviewed;pulse ox;side rails raised x 2         Problem: Pain (Surgery Nonspecified)  Goal: Acceptable Pain Control  Outcome: Ongoing (interventions implemented as appropriate)  Intervention: Prevent or Manage Pain   01/25/19 1402   Prevent or Manage Pain   Pain Management Interventions care clustered;medication offered but refused;pain management plan reviewed with patient/caregiver;quiet environment facilitated         Problem: Postoperative Urinary Retention (Surgery Nonspecified)  Goal: Effective Urinary Elimination  Outcome: Ongoing (interventions implemented as appropriate)  Intervention: Monitor and Manage Urinary Retention   01/25/19 1402   Promote Bladder Elimination   Urinary Elimination Promotion toileting offered

## 2019-01-25 NOTE — DISCHARGE INSTRUCTIONS
***  BATHING:                   You may shower after your dressing is removed, but no tub baths, hot tubs, saunas or swimming until you see the doctor.    ACTIVITY LEVEL: If you have received sedation or an anesthetic, you may feel sleepy for   several hours. Rest until you are more awake. Gradually resume your normal activities  ? No heavy lifting or straining, nothing over 10 lbs., like a gallon of milk.  ? Pelvic rest- no sex, tampons or douching until follow up or instructed by doctor.  DIET:  You may resume your home diet. If nausea is present, increase your diet gradually with fluids and bland foods.    Medications:  Pain medication should be taken only if needed and as directed. If antibiotics are prescribed, the medication should be taken until completed. You will be given an updated list of you medications.  ? No driving, alcoholic beverages or signing legal documents for next 24 hours or while taking pain medication    CALL THE DOCTOR:    For any obvious bleeding (some dried blood over the incision is normal).      Redness, swelling, foul smell around incision or fever over 101.   Shortness of breath, Coughing up Bloody Sputum or Pains or Swelling in your calves.   Persistent pain or nausea not relieved by medication.   If vaginal bleeding is in excess of a normal period.   Problems urinating    If any unusual problems or difficulties occur contact your doctor. If you cannot contact your doctor but feel your signs and symptoms warrant a physicians attention return to the emergency room.  Fall Prevention  Millions of people fall every year and injure themselves. You may have had anesthesia or sedation which may increase your risk of falling. You may have health issues that put you at an increased risk of falling.     Here are ways to reduce your risk of falling.  ·   · Make your home safe by keeping walkways clear of objects you may trip over.  · Use non-slip pads under rugs. Do not use area rugs or  small throw rugs.  · Use non-slip mats in bathtubs and showers.  · Install handrails and lights on staircases.  · Do not walk in poorly lit areas.  · Do not stand on chairs or wobbly ladders.  · Use caution when reaching overhead or looking upward. This position can cause a loss of balance.  · Be sure your shoes fit properly, have non-slip bottoms and are in good condition.   · Wear shoes both inside and out. Avoid going barefoot or wearing slippers.  · Be cautious when going up and down stairs, curbs, and when walking on uneven sidewalks.  · If your balance is poor, consider using a cane or walker.  · If your fall was related to alcohol use, stop or limit alcohol intake.   · If your fall was related to use of sleeping medicines, talk to your doctor about this. You may need to reduce your dosage at bedtime if you awaken during the night to go to the bathroom.    · To reduce the need for nighttime bathroom trips:  ¨ Avoid drinking fluids for several hours before going to bed  ¨ Empty your bladder before going to bed  ¨ Men can keep a urinal at the bedside  · Stay as active as you can. Balance, flexibility, strength, and endurance all come from exercise. They all play a role in preventing falls. Ask your healthcare provider which types of activity are right for you.  · Get your vision checked on a regular basis.  · If you have pets, know where they are before you stand up or walk so you don't trip over them.  · Use night lights.

## 2019-01-25 NOTE — ANESTHESIA PREPROCEDURE EVALUATION
01/25/2019  Hilary Wilson is a 63 y.o., female.    Anesthesia Evaluation    I have reviewed the Patient Summary Reports.    I have reviewed the Nursing Notes.   I have reviewed the Medications.     Review of Systems  Anesthesia Hx:  No problems with previous Anesthesia Denies Hx of Anesthetic complications  History of prior surgery of interest to airway management or planning: Denies Family Hx of Anesthesia complications.   Denies Personal Hx of Anesthesia complications.   Social:  Non-Smoker    Hematology/Oncology:  Hematology Normal   Oncology Normal     EENT/Dental:EENT/Dental Normal   Cardiovascular:   Exercise tolerance: good Hypertension Patient gets swelling to feet on occasoin and takes a fluid pill   Pulmonary:   Asthma Sleep Apnea, CPAP    Renal/:  Renal/ Normal     Hepatic/GI:  Hepatic/GI Normal    Musculoskeletal:  Musculoskeletal Normal    Neurological:  Neurology Normal    Endocrine:  Endocrine Normal Prediabetes   Dermatological:  Skin Normal    Psych:  Psychiatric Normal           Physical Exam  General:  Morbid Obesity    Airway/Jaw/Neck:  Airway Findings: Mallampati: II TM Distance: < 4 cm      Dental:  Dental Findings: Periodontal disease, Mild             Anesthesia Plan  Type of Anesthesia, risks & benefits discussed:  Anesthesia Type:  general  Patient's Preference:   Intra-op Monitoring Plan: standard ASA monitors  Intra-op Monitoring Plan Comments:   Post Op Pain Control Plan: multimodal analgesia, IV/PO Opioids PRN and per primary service following discharge from PACU  Post Op Pain Control Plan Comments:   Induction:   IV  Beta Blocker:  Patient is not currently on a Beta-Blocker (No further documentation required).       Informed Consent: Patient understands risks and agrees with Anesthesia plan.  Questions answered. Anesthesia consent signed with patient.  ASA Score: 2      Day of Surgery Review of History & Physical: I have interviewed and examined the patient. I have reviewed the patient's H&P dated: 01/17/19. There are no significant changes.  H&P update referred to the provider.  H&P completed by Anesthesiologist.       Ready For Surgery From Anesthesia Perspective.

## 2019-01-28 LAB — POCT GLUCOSE: 117 MG/DL (ref 70–110)

## 2019-01-28 NOTE — OP NOTE
Obstetrics & Gynecology  OPERATIVE REPORT     DATE: 01/25/2019    PREOPERATIVE DIAGNOSIS  1. Postmenopausal Bleeding  2. Endometrial Hyperplasia with Atypia    POSTOPERATIVE DIAGNOSIS  1. Same    PROCEDURE:  1. Hysteroscopy  2. Dilation and Curettage    SURGEON: Elías Flores MD    ANESTHESIA: General    COMPLICATIONS: None    EBL: Minimal    IV FLUIDS: 500 cc    FINDINGS:   1. 12 week size, Anteverted Uterus with multiple blood clots in vaginal vault  2. There were multiple small blood clots in the uterus.   3. As the uterus was distended, the clots were flushed out of the cavity.   4. The endometrium contained shaggy tissue throughout with areas of hyperplasia.   5. The tubal ostia were identified without difficulty and appeared normal.   6. The Truclear Dense Tissue Mini Shaver device was then attached to the hysteroscope and tissue resection.  7. The uterus was curetted in a clockwise fashion until gritty feeling was noted in all aspects of the uterus    PROCEDURE: Patient was taken to the operating room where general anesthesia was administered and found to be adequate.  She was prepped and draped in the dorsal lithotomy position.  A weighted sterile speculum was placed in the vagina.  The anterior lip of the cervix was grasped with a single tooth tenaculum.  The uterus was sounded to approximately 12 cm.  The hysteroscope was advanced through the cervical os. The uterus distended with normal saline. There were multiple small blood clots in the uterus. As the uterus was distended, the clots were flushed out of the cavity. The endometrium contained shaggy tissue throughout with areas of hyperplasia. The tubal ostia were identified without difficulty and appeared normal.     The Truclear Dense Tissue Mini Shaver device was then attached to the hysteroscope and tissue resection. The hysteroscope was removed. The uterus was curetted in a clockwise fashion until gritty feeling was noted in all aspects of the  uterus. The endometrial scraping and tissue fragments were sent to pathology.    All instruments were removed.  Excellent hemostasis was noted at the puncture sites in the cervix.    Sponge, lap, needle counts were correct x 2.    Patient tolerated the procedure well and was taken to the recovery room in stable condition.        Elías Flores MD

## 2019-01-28 NOTE — DISCHARGE SUMMARY
[facility]  Obstetrics & Gynecology  Discharge Summary    Patient Name: Hilary Wilson  MRN: [id number]  Admission Date: 1/25/2019  Hospital Length of Stay: 0 days  Discharge Date and Time:  01/28/2019 2:48 PM  Attending Physician: No att. providers found   Discharging Provider: Elías Nolasco MD  Primary Care Provider: Ene Dorantes MD      Hospital Course: Ms. Wilson is a 63 year old female who presents for scheduled Hysteroscopy D&C secondary to Postmenopausal Bleeding and Endometrial Hyperplasia. Procedure was without complications and the patient tolerated the procedure well. Please see op note for details. The patient was tolerating PO, voiding, ambulating without difficulty, and pain was well controlled after the procedure. Patient was discharged home on POD #0 with instructions to follow up in clinic in 4 weeks for postoperative check. Patient verbalized understanding.        Procedure(s) (LRB):  HYSTEROSCOPY, WITH DILATION AND CURETTAGE OF UTERUS (N/A)     Consults:     Significant Diagnostic Studies: none    Pending Diagnostic Studies:     None        Final Active Diagnoses:    Diagnosis Date Noted POA    PRINCIPAL PROBLEM:  Endometrial hyperplasia with atypia [N85.02] 01/17/2019 Yes    Status post hysteroscopy [Z98.890] 01/25/2019 Not Applicable      Problems Resolved During this Admission:        Discharged Condition: good    Disposition: Home or Self Care    Follow Up:  Follow-up Information     Elías Nolasco MD In 4 weeks.    Specialty:  Obstetrics and Gynecology  Why:  post-op check  Contact information:  [address]  [address] [place] [id number] [phone]                 Patient Instructions:      Diet general     Call MD for:  temperature >100.4     Call MD for:  persistent nausea and vomiting     Call MD for:  severe uncontrolled pain     Call MD for:  difficulty breathing, headache or visual disturbances     Call MD for:  redness,  tenderness, or signs of infection (pain, swelling, redness, odor or green/yellow discharge around incision site)     Call MD for:  hives     Call MD for:  persistent dizziness or light-headedness     Call MD for:  extreme fatigue     Medications:  Reconciled Home Medications:      Medication List      START taking these medications    HYDROcodone-acetaminophen 5-325 mg per tablet  Commonly known as:  NORCO  Take 1 tablet by mouth every 4 (four) hours as needed for Pain.        CHANGE how you take these medications    ibuprofen 800 MG tablet  Commonly known as:  ADVIL,MOTRIN  Take 1 tablet (800 mg total) by mouth every 8 (eight) hours as needed for Pain.  What changed:    · when to take this  · reasons to take this        CONTINUE taking these medications    albuterol 90 mcg/actuation inhaler  Commonly known as:  PROAIR HFA  Inhale 2 puffs into the lungs every 6 (six) hours as needed for Wheezing.     hydroCHLOROthiazide 25 MG tablet  Commonly known as:  HYDRODIURIL  Take 1 tablet (25 mg total) by mouth once daily.     mupirocin 2 % ointment  Commonly known as:  BACTROBAN  Apply topically 3 (three) times daily.     * phentermine 15 MG capsule  Take 1 capsule (15 mg total) by mouth every morning.     * phentermine 15 MG capsule  Take 1 capsule (15 mg total) by mouth every morning.  Start taking on:  2/11/2019     potassium chloride 8 mEq Cpsr  Commonly known as:  MICRO-K  Take 1 capsule (8 mEq total) by mouth once daily.         * This list has 2 medication(s) that are the same as other medications prescribed for you. Read the directions carefully, and ask your doctor or other care provider to review them with you.            ASK your doctor about these medications    etodolac 400 MG tablet  Commonly known as:  LODINE  Take 1 tablet (400 mg total) by mouth 2 (two) times daily.     furosemide 20 MG tablet  Commonly known as:  LASIX  Take 1 tablet (20 mg total) by mouth once daily.            Elías Nolasco  MD  Obstetrics & Gynecology  [facility]

## 2019-02-01 ENCOUNTER — OFFICE VISIT (OUTPATIENT)
Dept: OBSTETRICS AND GYNECOLOGY | Facility: CLINIC | Age: 64
End: 2019-02-01
Payer: COMMERCIAL

## 2019-02-01 VITALS
WEIGHT: 293 LBS | BODY MASS INDEX: 53.92 KG/M2 | DIASTOLIC BLOOD PRESSURE: 82 MMHG | HEIGHT: 62 IN | SYSTOLIC BLOOD PRESSURE: 118 MMHG

## 2019-02-01 DIAGNOSIS — C54.1 ENDOMETRIAL ADENOCARCINOMA: Primary | ICD-10-CM

## 2019-02-01 PROCEDURE — 99215 OFFICE O/P EST HI 40 MIN: CPT | Mod: S$GLB,,, | Performed by: OBSTETRICS & GYNECOLOGY

## 2019-02-01 PROCEDURE — 99215 PR OFFICE/OUTPT VISIT, EST, LEVL V, 40-54 MIN: ICD-10-PCS | Mod: S$GLB,,, | Performed by: OBSTETRICS & GYNECOLOGY

## 2019-02-01 PROCEDURE — 3074F PR MOST RECENT SYSTOLIC BLOOD PRESSURE < 130 MM HG: ICD-10-PCS | Mod: CPTII,S$GLB,, | Performed by: OBSTETRICS & GYNECOLOGY

## 2019-02-01 PROCEDURE — 3079F PR MOST RECENT DIASTOLIC BLOOD PRESSURE 80-89 MM HG: ICD-10-PCS | Mod: CPTII,S$GLB,, | Performed by: OBSTETRICS & GYNECOLOGY

## 2019-02-01 PROCEDURE — 99999 PR PBB SHADOW E&M-EST. PATIENT-LVL III: CPT | Mod: PBBFAC,,, | Performed by: OBSTETRICS & GYNECOLOGY

## 2019-02-01 PROCEDURE — 3074F SYST BP LT 130 MM HG: CPT | Mod: CPTII,S$GLB,, | Performed by: OBSTETRICS & GYNECOLOGY

## 2019-02-01 PROCEDURE — 3008F BODY MASS INDEX DOCD: CPT | Mod: CPTII,S$GLB,, | Performed by: OBSTETRICS & GYNECOLOGY

## 2019-02-01 PROCEDURE — 99999 PR PBB SHADOW E&M-EST. PATIENT-LVL III: ICD-10-PCS | Mod: PBBFAC,,, | Performed by: OBSTETRICS & GYNECOLOGY

## 2019-02-01 PROCEDURE — 3079F DIAST BP 80-89 MM HG: CPT | Mod: CPTII,S$GLB,, | Performed by: OBSTETRICS & GYNECOLOGY

## 2019-02-01 PROCEDURE — 3008F PR BODY MASS INDEX (BMI) DOCUMENTED: ICD-10-PCS | Mod: CPTII,S$GLB,, | Performed by: OBSTETRICS & GYNECOLOGY

## 2019-02-01 RX ORDER — HYDROCHLOROTHIAZIDE 25 MG/1
TABLET ORAL
Qty: 90 TABLET | Refills: 3 | Status: SHIPPED | OUTPATIENT
Start: 2019-02-01 | End: 2020-06-12 | Stop reason: SDUPTHER

## 2019-02-01 NOTE — PROGRESS NOTES
History & Physical  Gynecology      SUBJECTIVE:     Chief Complaint: Follow-up       History of Present Illness:  Ms. Wilson is a 62 y/o female who presents to clinic to discuss sx results. She initially presented 12/27 c/o vaginal bleeding x 3 weeks. She reported that bleeding would stop for a day or so but was mostly constant. She reported that she had been using 2 pads at once. She reported that she changes pads 3 times a day. She reported that she had been passing blood clots up to the size of a golf ball and soiling her clothing.     Of note, she reports that she never stopped having menses and prior to the start of heavy bleeding she had a period monthly lasting 2-3 days at a time.    Hysteroscopy D&C  Pathology  Specimen submitted as endometrial scrapings:  -Moderately differentiated endometrioid adenocarcinoma  -Carcinoma exhibits a largely glandular growth pattern with moderate to focally severe nuclear atypia  -Fragments of endometrium exhibiting complex hyperplasia with severe atypia  -Fragments of endometrial polyp  -Scant fragments of proliferative endometrium    Endometrial Biopsy 01/08/2019  FINAL PATHOLOGIC DIAGNOSIS  Endometrial biopsy:  - A fragment of atypical endometrial hyperplasia among blood clots .    TVUS 01/03/2019  FINDINGS:  Uterus:  Size: 13.9 x 7.7 x 8.8 cm    Masses: There are at least 4 uterine fibroids identified.  There is a fibroid identified along the posterior body of the uterus in a subserosal location measuring 3.2 x 1.5 x 1.5 cm.  There is a subserosal fibroid identified along the anterior fundus of the uterus on the right measuring 3.3 x 3.9 x 3.1 cm in size.  There is a subserosal fibroid along the posterior body of the uterus measuring 3.8 x 2.3 x 3.4 cm in size.  There is also an intramural fibroid along the anterior aspect of the body of the uterus on the left measuring 2.1 x 1.9 x 1.8 cm.    Endometrium: The endometrial stripe measures 15 mm in thickness.  Given the  patient's age and history of abnormal uterine bleeding, endometrial biopsy should be considered.  Note that the patient reports that she is not postmenopausal and the endometrial thickness could be related to the phase of the patient's menstrual cycle.    Right ovary:  Not visualized.    Left ovary:  Not visualized.    Free Fluid:  None.      Impression       Prominent endometrial stripe measuring 15 mm in this patient with abnormal uterine bleeding.  Although the patient reports being premenopausal, further evaluation should be considered given the abnormal uterine bleeding and patient's age.       Review of patient's allergies indicates:  No Known Allergies    Past Medical History:   Diagnosis Date    Asthma     Hyperlipidemia     Hypertension     JOSUÉ on CPAP     Vaginal delivery     x3     Past Surgical History:   Procedure Laterality Date    BREAST BIOPSY Left     COLONOSCOPY N/A 11/3/2016    Performed [facility]z [facility]    HYSTEROSCOPY, WITH DILATION AND CURETTAGE OF UTERUS N/A 2019    [facility] at [facility]    mass removal      TUBAL LIGATION       OB History      Para Term  AB Living    3 3 3     3    SAB TAB Ectopic Multiple Live Births                     Family History   Problem Relation Age of Onset    Hypertension Mother     Kidney disease Mother     Diabetes Father     Stroke Father     Heart disease Sister 49    Kidney disease Sister     Stroke Sister         in 50s     Social History     Tobacco Use    Smoking status: Never Smoker    Smokeless tobacco: Never Used   Substance Use Topics    Alcohol use: No    Drug use: No       Current Outpatient Medications   Medication Sig    albuterol (PROAIR HFA) 90 mcg/actuation inhaler Inhale 2 puffs into the lungs every 6 (six) hours as needed for Wheezing.    etodolac (LODINE) 400 MG tablet Take 1 tablet (400 mg total) by mouth 2 (two) times daily.    furosemide (LASIX) 20  MG tablet Take 1 tablet (20 mg total) by mouth once daily.    HYDROcodone-acetaminophen (NORCO) 5-325 mg per tablet Take 1 tablet by mouth every 4 (four) hours as needed for Pain.    ibuprofen (ADVIL,MOTRIN) 800 MG tablet Take 1 tablet (800 mg total) by mouth every 8 (eight) hours as needed for Pain.    mupirocin (BACTROBAN) 2 % ointment Apply topically 3 (three) times daily.    phentermine 15 MG capsule Take 1 capsule (15 mg total) by mouth every morning.    [START ON 2/11/2019] phentermine 15 MG capsule Take 1 capsule (15 mg total) by mouth every morning.    potassium chloride (MICRO-K) 8 mEq CpSR Take 1 capsule (8 mEq total) by mouth once daily.    hydroCHLOROthiazide (HYDRODIURIL) 25 MG tablet TAKE 1 TABLET BY MOUTH ONE TIME DAILY     No current facility-administered medications for this visit.      Review of Systems:  Review of Systems   Constitutional: Negative for chills and fever.   Respiratory: Negative for cough and wheezing.    Cardiovascular: Negative for chest pain and palpitations.   Gastrointestinal: Negative for abdominal pain, nausea and vomiting.   Genitourinary: Positive for postmenopausal bleeding. Negative for dysuria, frequency, hematuria, pelvic pain, vaginal bleeding, vaginal discharge and vaginal pain.        OBJECTIVE:     Physical Exam:  Physical Exam   Constitutional: She is oriented to person, place, and time. She appears well-developed and well-nourished.   Cardiovascular: Normal rate.   Pulmonary/Chest: Effort normal.   Neurological: She is alert and oriented to person, place, and time.   Skin: Skin is warm and dry.   Psychiatric: She has a normal mood and affect.   Nursing note and vitals reviewed.    ASSESSMENT:       ICD-10-CM ICD-9-CM    1. Endometrial adenocarcinoma C54.1 182.0 Ambulatory Referral to Gynecologic Oncology     Plan:      Hilary was seen today for follow-up.    Diagnoses and all orders for this visit:    Endometrial adenocarcinoma  -     Ambulatory Referral to  Gynecologic Oncology  - Discussion of pathology results with patient and her daughter. Informed them that the presence of endometrial cancer was found and at this time a referral to Gyn Oncology will be made. Patient prefers female doctor. Questions about stage and invasion (daughter is in medicine) were asked but it was explained to patient stage and depth of invasion would not be known until surgery was done. All patient and family questions answered.     Orders Placed This Encounter   Procedures    Ambulatory Referral to Gynecologic Oncology       Follow-up if symptoms worsen or fail to improve.     Counseling time: 30 minutes    Elías Nolasco

## 2019-02-04 ENCOUNTER — TELEPHONE (OUTPATIENT)
Dept: GYNECOLOGIC ONCOLOGY | Facility: CLINIC | Age: 64
End: 2019-02-04

## 2019-02-04 NOTE — TELEPHONE ENCOUNTER
----- Message from Milly Trivedi sent at 2/4/2019  1:06 PM CST -----  Contact: KIMMY REYNA [[id number]]  Name of Who is Calling: KIMMY REYNA [[id number]]      What is the request in detail: Please call the patient back regarding and appointment.       Can the clinic reply by [facility]: no    What [facility] Back [facility]: [phone]

## 2019-02-04 NOTE — TELEPHONE ENCOUNTER
Left message informing pt message was received regarding scheduling a new pt appt. Pt given new pt process in voicemail. Pt advised to contact the office with any questions or concerns. Phone number given in message

## 2019-02-04 NOTE — TELEPHONE ENCOUNTER
Spoke with pt. Pt informed message regarding becoming a new pt will be given to the physician, physician will review records, schedulers will be informed to contact pt and schedule appt. Pt voiced understanding

## 2019-02-04 NOTE — TELEPHONE ENCOUNTER
----- Message from Elizabeth Augustine sent at 2/4/2019 11:04 AM CST -----  Contact: Tami   Name of Who is Calling: [facility]       What is the request in detail: Per Tami she is requesting a appointment for this patient she  was dx with Endometrial Adenocarcinoma Cancer DX C54.1 patient was referred by Dr.Bradley Flores       Can the clinic reply by [facility]: no      What Number to Call Back if not in [facility]: [phone]

## 2019-02-04 NOTE — PATIENT INSTRUCTIONS
What is Endometrial Cancer?    Cancer occurs when cells in the body change and start growing out of control. Cancer cells can form lumps of tissue called tumors. Cancer that starts in the lining of the uterus is called endometrial cancer.  Understanding the uterus and endometrium  The uterus is part of the female reproductive system. It's the organ that holds the baby when a woman is pregnant. The endometrium is the inside lining of the uterus. Each month, from puberty to menopause, the lining grows and thickens to prepare for pregnancy. This thickened lining helps to nourish a growing baby. If a woman doesnt become pregnant, the lining of the uterus is shed. This is her period.  When endometrial cancer forms  The endometrium is the most common place in the uterus for cancer to begin. It's the most common type of gynecologic cancer (cancer in the female reproductive system). Cancer can destroy tissue in the uterus. Cancer cells may spread to nearby organs. They can also spread to other parts of the body. When cancer spreads, it is called metastasis. In general, the more cancer spreads, the harder it is to treat.  Treatment options for cancer of the uterus  You and your healthcare provider will discuss your treatment options. These may include:  · Surgery to remove the uterus (hysterectomy).  · Radiation therapy. This uses directed rays of high-energy to kill cancer cells.  · Chemotherapy. This uses strong medicine to kill cancer cells.  · Hormone therapy. This may help slow the growth of cancer cells.  · Biologic therapy (immunotherapy). This uses medicines that act like your body's own immune system to fight cancer. (In clinical trials)  Date Last Reviewed: 7/31/2015  © 4557-7803 [employer]. [address] [place] [address], [place] [address]. All rights reserved. This information is not intended as a substitute for professional medical care. Always follow your healthcare professional's  instructions.

## 2019-02-05 ENCOUNTER — TELEPHONE (OUTPATIENT)
Dept: GYNECOLOGIC ONCOLOGY | Facility: CLINIC | Age: 64
End: 2019-02-05

## 2019-02-05 ENCOUNTER — TELEPHONE (OUTPATIENT)
Dept: OBSTETRICS AND GYNECOLOGY | Facility: CLINIC | Age: 64
End: 2019-02-05

## 2019-02-05 NOTE — TELEPHONE ENCOUNTER
----- Message from Delfino Sparks sent at 2/5/2019  9:14 AM CST -----  Contact: Tami   Good Morning, I spoke with Mrs. Wilson she would like to be seen this month she states that she would like to have surgery by March. Please let me know if any of the other providers can see her. Thanks   ----- Message -----  From: Yulisa Livingston MA  Sent: 2/5/2019   9:06 AM  To: Delfino Sparks    Csn you schedule this pt?    she will be a consult     ----- Message -----  From: Laura Carcamo MD  Sent: 2/5/2019   8:01 AM  To: Yulisa Livingston MA    Yes, please schedule. Thank you.   ----- Message -----  From: Yulisa Livingston MA  Sent: 2/4/2019   4:01 PM  To: Laura Carcamo MD    Okay to schedule       ----- Message -----  From: Elizabeth Augustine  Sent: 2/4/2019  11:04 AM  To: Carlos Alberto Sanchez Staff    Name of Who is Calling: [facility]       What is the request in detail: Per Tami she is requesting a appointment for this patient she  was dx with Endometrial Adenocarcinoma Cancer DX C54.1 patient was referred by Dr.Bradley Flores       Can the clinic reply by [facility]: no      What Number to Call Back if not in [facility]: [phone]

## 2019-02-05 NOTE — ANESTHESIA POSTPROCEDURE EVALUATION
"Anesthesia Post Evaluation    Patient: Hilary Wilson    Procedure(s) Performed: Procedure(s) (LRB):  HYSTEROSCOPY, WITH DILATION AND CURETTAGE OF UTERUS (N/A)    Final Anesthesia Type: general  Patient location during evaluation: PACU  Patient participation: Yes- Able to Participate  Level of consciousness: awake and alert and oriented  Post-procedure vital signs: reviewed and stable  Pain management: adequate  Airway patency: patent  PONV status at discharge: No PONV  Anesthetic complications: no      Cardiovascular status: blood pressure returned to baseline and hemodynamically stable  Respiratory status: unassisted, spontaneous ventilation and room air  Hydration status: euvolemic  Follow-up not needed.        Visit Vitals  BP (!) 148/70 (Patient Position: Lying)   Pulse 90   Temp 36.5 °C (97.7 °F) (Oral)   Resp 15   Ht 5' 2" (1.575 m)   Wt (!) 151.7 kg (334 lb 7 oz)   LMP 05/23/2014   SpO2 100%   Breastfeeding? No   BMI 61.17 kg/m²       Pain/Meryl Score: No Data Recorded      "

## 2019-02-05 NOTE — TELEPHONE ENCOUNTER
Appt was already made.       ----- Message from Elías Nolasco MD sent at 2/4/2019 12:21 AM CST -----  Please make gyn onc appt with Dr. Carcamo at [facility]. Thank you

## 2019-02-05 NOTE — TELEPHONE ENCOUNTER
----- Message from Delfino Sparks sent at 2/5/2019  9:14 AM CST -----  Contact: Tami   Good Morning, I spoke with Mrs. Wilson she would like to be seen this month she states that she would like to have surgery by March. Please let me know if any of the other providers can see her. Thanks   ----- Message -----  From: Yulisa Livingston MA  Sent: 2/5/2019   9:06 AM  To: Delfino Sparks    Csn you schedule this pt?    she will be a consult     ----- Message -----  From: Laura Carcamo MD  Sent: 2/5/2019   8:01 AM  To: Yulisa Livingston MA    Yes, please schedule. Thank you.   ----- Message -----  From: Yulisa Livingston MA  Sent: 2/4/2019   4:01 PM  To: Laura Carcamo MD    Okay to schedule       ----- Message -----  From: Elizabeth Augustine  Sent: 2/4/2019  11:04 AM  To: Carlos Alberto Sanchez Staff    Name of Who is Calling: [facility]       What is the request in detail: Per Tami she is requesting a appointment for this patient she  was dx with Endometrial Adenocarcinoma Cancer DX C54.1 patient was referred by Dr.Bradley Flores       Can the clinic reply by [facility]: no      What Number to Call Back if not in [facility]: [phone]

## 2019-02-06 ENCOUNTER — PATIENT MESSAGE (OUTPATIENT)
Dept: OBSTETRICS AND GYNECOLOGY | Facility: CLINIC | Age: 64
End: 2019-02-06

## 2019-02-06 ENCOUNTER — PATIENT MESSAGE (OUTPATIENT)
Dept: GYNECOLOGIC ONCOLOGY | Facility: CLINIC | Age: 64
End: 2019-02-06

## 2019-02-07 ENCOUNTER — TELEPHONE (OUTPATIENT)
Dept: OBSTETRICS AND GYNECOLOGY | Facility: CLINIC | Age: 64
End: 2019-02-07

## 2019-02-07 NOTE — TELEPHONE ENCOUNTER
----- Message from Ailyn Dixon sent at 2/7/2019  2:12 PM CST -----  Contact: Self   Patient would like to know when she can begin to take a bath again instead of a shower. Please call at [phone].      Pt is aware that she can start bathing again

## 2019-02-12 ENCOUNTER — TELEPHONE (OUTPATIENT)
Dept: ADMINISTRATIVE | Facility: HOSPITAL | Age: 64
End: 2019-02-12

## 2019-02-12 DIAGNOSIS — J45.909 UNCOMPLICATED ASTHMA: ICD-10-CM

## 2019-02-12 DIAGNOSIS — Z12.31 ENCOUNTER FOR SCREENING MAMMOGRAM FOR MALIGNANT NEOPLASM OF BREAST: Primary | ICD-10-CM

## 2019-02-12 RX ORDER — ALBUTEROL SULFATE 90 UG/1
AEROSOL, METERED RESPIRATORY (INHALATION)
Qty: 24 INHALER | Refills: 3 | Status: SHIPPED | OUTPATIENT
Start: 2019-02-12 | End: 2021-05-03 | Stop reason: SDUPTHER

## 2019-02-12 NOTE — TELEPHONE ENCOUNTER
Roma Goodson,     This patient is scheduled for an upcoming appointment in a specialty clinic participating in the [facility]. Upon reviewing the screening/pathology report for colon cancer screening, there is a finding that requires a review of the current modifier. Please review with the patients PCP if the modifier for colon cancer screening should be changed based on that finding.    Thank you,     Daniela Herbert  Panel Coordinator   [facility]

## 2019-02-14 ENCOUNTER — TELEPHONE (OUTPATIENT)
Dept: GYNECOLOGIC ONCOLOGY | Facility: CLINIC | Age: 64
End: 2019-02-14

## 2019-02-15 ENCOUNTER — INITIAL CONSULT (OUTPATIENT)
Dept: GYNECOLOGIC ONCOLOGY | Facility: CLINIC | Age: 64
End: 2019-02-15
Payer: COMMERCIAL

## 2019-02-15 VITALS
HEART RATE: 96 BPM | SYSTOLIC BLOOD PRESSURE: 148 MMHG | WEIGHT: 293 LBS | DIASTOLIC BLOOD PRESSURE: 67 MMHG | BODY MASS INDEX: 62.5 KG/M2

## 2019-02-15 DIAGNOSIS — C54.1 ENDOMETRIAL CANCER: ICD-10-CM

## 2019-02-15 DIAGNOSIS — D49.59 ENDOMETRIAL NEOPLASM: ICD-10-CM

## 2019-02-15 PROCEDURE — 99999 PR PBB SHADOW E&M-EST. PATIENT-LVL III: ICD-10-PCS | Mod: PBBFAC,,, | Performed by: OBSTETRICS & GYNECOLOGY

## 2019-02-15 PROCEDURE — 99245 PR OFFICE CONSULTATION,LEVEL V: ICD-10-PCS | Mod: S$GLB,,, | Performed by: OBSTETRICS & GYNECOLOGY

## 2019-02-15 PROCEDURE — 99245 OFF/OP CONSLTJ NEW/EST HI 55: CPT | Mod: S$GLB,,, | Performed by: OBSTETRICS & GYNECOLOGY

## 2019-02-15 PROCEDURE — 99999 PR PBB SHADOW E&M-EST. PATIENT-LVL III: CPT | Mod: PBBFAC,,, | Performed by: OBSTETRICS & GYNECOLOGY

## 2019-02-15 NOTE — PROGRESS NOTES
Subjective:      Patient ID: Hilary Wilson is a 63 y.o. female.    Chief Complaint: Endometrial Cancer (Referred by Dr. Floers)      HPI  Here today at the request of Dr. Nolasco due to recently detected endometrial cancer.  She initially had an EMB due to thickened endometrial stripe revealing hyperplasia.  She subsequently developed fairly heavy VB x 3 weeks and was taken to the OR for HSC D&C on 1/25/19.  Path from that surgery showed G2 endometrial cancer in the background of CAH.  Reports bleeding is better since procedure.  Never stopped bleeding with menopause. PSH significant for BTL.  FH negative for breast/colon/gyn cancer.  TVUS showed uterus was 14 x 8 cm.  Review of Systems   Constitutional: Negative for activity change, appetite change, chills, fatigue and fever.   HENT: Negative for hearing loss, mouth sores, nosebleeds, sore throat and tinnitus.    Eyes: Negative for visual disturbance.   Respiratory: Negative for cough, chest tightness, shortness of breath and wheezing.    Cardiovascular: Negative for chest pain and leg swelling.   Gastrointestinal: Negative for abdominal distention, abdominal pain, blood in stool, constipation, diarrhea, nausea and vomiting.   Genitourinary: Negative for dysuria, flank pain, frequency, hematuria, pelvic pain, vaginal bleeding, vaginal discharge and vaginal pain.   Musculoskeletal: Negative for arthralgias and back pain.   Skin: Negative for rash.   Neurological: Negative for dizziness, seizures, syncope, weakness and numbness.   Hematological: Does not bruise/bleed easily.   Psychiatric/Behavioral: Negative for confusion and sleep disturbance. The patient is not nervous/anxious.        Past Medical History:   Diagnosis Date    Asthma     Hyperlipidemia     Hypertension     JOSUÉ on CPAP     Uterine cancer     Vaginal delivery     x3     Past Surgical History:   Procedure Laterality Date    BREAST BIOPSY Left     COLONOSCOPY N/A 11/3/2016     Performed by [facility]z [facility] at [facility] ENDO    HYSTEROSCOPY, WITH DILATION AND CURETTAGE OF UTERUS N/A 1/25/2019    Performed by Elías [facility]co [facility] at [facility] OR    mass removal      TUBAL LIGATION       Family History   Problem Relation Age of Onset    Hypertension Mother     Kidney disease Mother     Diabetes Father     Stroke Father     Heart disease Sister 49    Kidney disease Sister     Stroke Sister         in 50s     Social History     Socioeconomic History    Marital status:      Spouse name: Not on file    Number of children: Not on file    Years of education: Not on file    Highest education level: Not on file   Social Needs    Financial resource strain: Not on file    Food insecurity - worry: Not on file    Food insecurity - inability: Not on file    Transportation needs - medical: Not on file    Transportation needs - non-medical: Not on file   Occupational History    Not on file   Tobacco Use    Smoking status: Never Smoker    Smokeless tobacco: Never Used   Substance and Sexual Activity    Alcohol use: No    Drug use: No    Sexual activity: Yes     Partners: Male   Other Topics Concern    Not on file   Social History Narrative    Not on file     Current Outpatient Medications   Medication Sig    albuterol (PROVENTIL/VENTOLIN HFA) 90 mcg/actuation inhaler INHALE 2 PUFFS ORALLY EVERY SIX HOURS AS NEEDED FOR WHEEZING    etodolac (LODINE) 400 MG tablet Take 1 tablet (400 mg total) by mouth 2 (two) times daily.    furosemide (LASIX) 20 MG tablet Take 1 tablet (20 mg total) by mouth once daily.    hydroCHLOROthiazide (HYDRODIURIL) 25 MG tablet TAKE 1 TABLET BY MOUTH ONE TIME DAILY    HYDROcodone-acetaminophen (NORCO) 5-325 mg per tablet Take 1 tablet by mouth every 4 (four) hours as needed for Pain.    ibuprofen (ADVIL,MOTRIN) 800 MG tablet Take 1 tablet (800 mg total) by mouth every 8 (eight) hours as needed for Pain.    mupirocin (BACTROBAN) 2 % ointment  Apply topically 3 (three) times daily.    phentermine 15 MG capsule Take 1 capsule (15 mg total) by mouth every morning.    potassium chloride (MICRO-K) 8 mEq CpSR Take 1 capsule (8 mEq total) by mouth once daily.     No current facility-administered medications for this visit.      Review of patient's allergies indicates:  No Known Allergies    Objective:   Physical Exam:   Constitutional: She is oriented to person, place, and time. She appears well-developed and well-nourished. No distress.    HENT:   Head: Normocephalic and atraumatic.    Eyes: No scleral icterus.    Neck: Normal range of motion. Neck supple.    Cardiovascular: Normal rate and intact distal pulses.  Exam reveals no cyanosis and no edema.     Pulmonary/Chest: Effort normal. No respiratory distress. She exhibits no tenderness.        Abdominal: Soft. Normal appearance. She exhibits no distension, no fluid wave, no ascites and no mass. There is no tenderness. There is no rigidity, no rebound and no guarding. No hernia.     Genitourinary: Rectum normal and vagina normal. Pelvic exam was performed with patient supine. There is no rash, tenderness or lesion on the right labia. There is no rash, tenderness or lesion on the left labia. Uterus is enlarged. Uterus is not deviated, not fixed, not tender, not hosting fibroids and not experiencing uterine prolapse. Cervix is normal. Right adnexum displays no mass, no tenderness and no fullness. Left adnexum displays no mass, no tenderness and no fullness. No bleeding in the vagina. No vaginal discharge found. Labial bartholins normal.Cervix exhibits no motion tenderness, no discharge and no friability.        Uterus Size: 14 cm   Musculoskeletal: Normal range of motion and moves all extremeties. She exhibits no edema.      Lymphadenopathy:     She has no cervical adenopathy.        Right: No inguinal adenopathy present.        Left: No inguinal adenopathy present.    Neurological: She is alert and oriented  to person, place, and time.    Skin: Skin is warm. No rash noted. No cyanosis or erythema.    Psychiatric: She has a normal mood and affect. Thought content normal.       Assessment:     1. BMI 60.0-69.9, adult    2. Endometrial cancer        Plan:       Discussed biopsy results and need for hysterectomy.  Recommended RALH/BSO/Poss staging if technically feasible.  The risks, benefits, and indications of the procedure were discussed with the patient and her .  These included bleeding, infection, damage to surrounding tissues, conversion to laparotomy, and the possibility of major complications including death.  She voiced understanding, all questions were answered and consents were signed. Will get CT to r/o obvious distant disease or abdominal wall defects.

## 2019-02-15 NOTE — LETTER
February 15, 2019      Elías Nolasco MD  [address] [facility] [address]  [address]  [place] [address]           [facility] [address]  [address], [address]  [facility] [address]  Phone: [phone]  Fax: [phone]          Patient: Hilary Wilson   MR Number: [id number]   YOB: 1955   Date of Visit: 2/15/2019       Dear Dr. Elías Nolasco:    Thank you for referring Hilary Wilson to me for evaluation. Attached you will find relevant portions of my assessment and plan of care.    If you have questions, please do not hesitate to call me. I look forward to following Hilary Wilson along with you.    Sincerely,    Stiven Geronimo MD    Enclosure  CC:  No Recipients    If you would like to receive this communication electronically, please contact [email or web address] or [phone] to request more information on [facility] Link access.    For providers and/or their staff who would like to refer a patient to [facility], please contact us through our one-stop-shop provider referral line, [facility], at [phone].    If you feel you have received this communication in error or would no longer like to receive these types of communications, please e-mail [email or web address]

## 2019-02-19 ENCOUNTER — TELEPHONE (OUTPATIENT)
Dept: GYNECOLOGIC ONCOLOGY | Facility: CLINIC | Age: 64
End: 2019-02-19

## 2019-02-19 DIAGNOSIS — C54.1 ENDOMETRIAL CANCER: Primary | ICD-10-CM

## 2019-03-01 ENCOUNTER — ANESTHESIA EVENT (OUTPATIENT)
Dept: SURGERY | Facility: HOSPITAL | Age: 64
End: 2019-03-01
Payer: COMMERCIAL

## 2019-03-01 DIAGNOSIS — Z01.818 PREOPERATIVE TESTING: Primary | ICD-10-CM

## 2019-03-01 NOTE — PRE ADMISSION SCREENING
Anesthesia Assessment: Preoperative EQUATION    Planned Procedure: Procedure(s) (LRB):  XI ROBOTIC HYSTERECTOMY (N/A)  XI ROBOTIC SALPINGO-OOPHORECTOMY (Bilateral)  STAGING (N/A)  Requested Anesthesia Type:General  Surgeon: Stiven Geronimo MD  Service: OB/GYN  Known or anticipated Date of Surgery:3/19/2019    Surgeon notes: reviewed    Electronic QUestionnaire Assessment completed via nurse interview with patient.        NO AQ      Triage considerations:     The patient has no apparent active cardiac condition (No unstable coronary Syndrome such as severe unstable angina or recent [<1 month] myocardial infarction, decompensated CHF, severe valvular   disease or significant arrhythmia)    Previous anesthesia records:GETA and MAC    01/25/19 Placement Time: 1148 Method of Intubation: Glidescope Inserted by: CRNA Airway Device: Endotracheal Tube Mask Ventilation: Easy Intubated: Postinduction Blade: Other (see comments) , Glidescope LoPro S3  Airway Device Size: 7.0 Style: Cuffed Cuff Inflation: Minimal occlusive pressure Placement Verified By: Auscultation;Capnometry;ETT Condensation Grade: Grade I Complicating Factors: Oropharyngeal edema or fat;Obesity;Small mouth Findings Post-Intubation: Positive EtCO2;Bilateral breath sounds;Atraumatic/Condition of teeth unchanged Depth of Insertion (cm): 21 Secured at: Lips Complications: None Breath Sounds: Equal Bilateral Insertion attempts (enter comment if more than 2 attempts): 1   Airway/Jaw/Neck:  Airway Findings: Mallampati: II TM Distance: < 4 cm      Dental:  Dental Findings: Periodontal disease, Mild             Last PCP note: 3-6 months ago   Subspecialty notes: GYN ONC    Other important co-morbidities: HTN, morbid obesity, JOSUÉ, endometrial cancer     Tests already available:  Available tests,  within 3 months . 1/22/19 EKG, CBC, BMP. 8/2018 A1c, CMP, TSH.            Instructions given. (See in Nurse's note)    Optimization:  Anesthesia Preop Clinic Assessment   Indicated-out of town pt, has recent anesthesia note, not able to return for POC visit  Medical Opinion Indicated-will ask PCP to clear at upcoming appt on 3/12          Plan:    Testing:  T&S     Consultation:Patient's PCP for a statement of optimization      Patient  has previously scheduled Medical Appointment: PCP 3/12    Navigation:               Consults scheduled.             Results will be tracked by Preop Clinic.

## 2019-03-01 NOTE — ANESTHESIA PREPROCEDURE EVALUATION
Rosanna Ellsworth, RN   Registered Nurse      Pre Admission Screening   Signed                             []Hide copied text    []Hover for details      Anesthesia Assessment: Preoperative EQUATION     Planned Procedure: Procedure(s) (LRB):  XI ROBOTIC HYSTERECTOMY (N/A)  XI ROBOTIC SALPINGO-OOPHORECTOMY (Bilateral)  STAGING (N/A)  Requested Anesthesia Type:General  Surgeon: Stiven Geronimo MD  Service: OB/GYN  Known or anticipated Date of Surgery:3/19/2019     Surgeon notes: reviewed     Electronic QUestionnaire Assessment completed via nurse interview with patient.         NO AQ        Triage considerations:      The patient has no apparent active cardiac condition (No unstable coronary Syndrome such as severe unstable angina or recent [<1 month] myocardial infarction, decompensated CHF, severe valvular   disease or significant arrhythmia)     Previous anesthesia records:GETA and MAC    01/25/19 Placement Time: 1148 Method of Intubation: Glidescope Inserted by: CRNA Airway Device: Endotracheal Tube Mask Ventilation: Easy Intubated: Postinduction Blade: Other (see comments) , Glidescope LoPro S3  Airway Device Size: 7.0 Style: Cuffed Cuff Inflation: Minimal occlusive pressure Placement Verified By: Auscultation;Capnometry;ETT Condensation Grade: Grade I Complicating Factors: Oropharyngeal edema or fat;Obesity;Small mouth Findings Post-Intubation: Positive EtCO2;Bilateral breath sounds;Atraumatic/Condition of teeth unchanged Depth of Insertion (cm): 21 Secured at: Lips Complications: None Breath Sounds: Equal Bilateral Insertion attempts (enter comment if more than 2 attempts): 1   Airway/Jaw/Neck:  Airway Findings: Mallampati: II TM Distance: < 4 cm      Dental:  Dental Findings: Periodontal disease, Mild               Last PCP note: 3-6 months ago   Subspecialty notes: GYN ONC     Other important co-morbidities: HTN, morbid obesity, JOSUÉ, endometrial cancer     Tests already available:  Available tests,   within 3 months . 1/22/19 EKG, CBC, BMP. 8/2018 A1c, CMP, TSH.                            Instructions given. (See in Nurse's note)     Optimization:  Anesthesia Preop Clinic Assessment  Indicated-out of town pt, has recent anesthesia note, not able to return for POC visit  Medical Opinion Indicated-will ask PCP to clear at upcoming appt on 3/12                                        Plan:    Testing:  T&S                           Consultation:Patient's PCP for a statement of optimization                            Patient  has previously scheduled Medical Appointment: PCP 3/12     Navigation:                          Consults scheduled.                        Results will be tracked by Preop Clinic.                               Electronically signed by Rosanna Ellsworth RN at 3/1/2019  3:12 PM       Pre-admit on 3/19/2019            Detailed Report    3/15/19-PCP clearance note in Epic on 3/12/19.                                                                                                          03/01/2019  Hilary Wilson is a 63 y.o., female.    Anesthesia Evaluation    I have reviewed the Patient Summary Reports.     I have reviewed the Medications.     Review of Systems  Anesthesia Hx:  Hx of Anesthetic complications  History of prior surgery of interest to airway management or planning: Previous anesthesia: General 1/25/19 D & C with general anesthesia.  Procedure performed at an [facility] Facility. Denies Family Hx of Anesthesia complications.  Personal Hx of Anesthesia complications  Difficult Intubation, glidescope used successfully   Social:  Non-Smoker    Hematology/Oncology:  Hematology Normal       Endometrial Current/Recent Cancer.   EENT/Dental:EENT/Dental Normal   Cardiovascular:   Exercise tolerance: poor Hypertension  Denies Angina.  Functional Capacity 3 METS  Hypertension , Well Controlled on Rx    Pulmonary:   Asthma mild Denies Shortness of breath.  Denies Recent URI. Sleep Apnea, CPAP   Asthma:  last episode was > 1 year ago.   Inhaler use is rescue inhaler PRN. Current breathing status is optimal, free of wheezing.  Obstructive Sleep Apnea (JOSUÉ), CPAP used.   Renal/:  Renal/ Normal     Hepatic/GI:  Hepatic/GI Normal    Musculoskeletal:   Arthritis   Musculoskeletal General/Symptoms: Functional capacity is ambulatory with cane.    Neurological:  Neurology Normal    Endocrine:   Prediabetes A1c 6.2 Metabolic Disorders, Morbid Obesity / BMI > 40  Dermatological:  Skin Normal    Psych:  Psychiatric Normal           Physical Exam  General:  Well nourished, Morbid Obesity    Airway/Jaw/Neck:  Airway Findings: Mouth Opening: Small, but > 3cm Tongue: Normal  General Airway Assessment: Adult  Mallampati: III  TM Distance: Normal, at least 6 cm  Jaw/Neck Findings:  Neck Findings:  Girth Increased     Eyes/Ears/Nose:  EYES/EARS/NOSE FINDINGS: Normal   Dental:  Dental Findings: Upper partial dentures    Chest/Lungs:  Chest/Lungs Findings: Normal Respiratory Rate     Heart/Vascular:  Heart Findings: Rate: Normal  Rhythm: Regular Rhythm     Abdomen:  Abdomen Findings: Normal      Mental Status:  Mental Status Findings:  Cooperative         Anesthesia Plan  Type of Anesthesia, risks & benefits discussed:  Anesthesia Type:  general  Patient's Preference: General  Intra-op Monitoring Plan: standard ASA monitors  Intra-op Monitoring Plan Comments:   Post Op Pain Control Plan: per primary service following discharge from PACU and IV/PO Opioids PRN  Post Op Pain Control Plan Comments:   Induction:    Beta Blocker:  Patient is not currently on a Beta-Blocker (No further documentation required).       Informed Consent: Patient understands risks and agrees with Anesthesia plan.  Questions answered. Anesthesia consent signed with patient.  ASA Score: 3     Day of Surgery Review of History & Physical:    H&P update referred to the surgeon.         Ready For Surgery From Anesthesia Perspective.

## 2019-03-06 ENCOUNTER — PATIENT MESSAGE (OUTPATIENT)
Dept: SURGERY | Facility: HOSPITAL | Age: 64
End: 2019-03-06

## 2019-03-12 ENCOUNTER — OFFICE VISIT (OUTPATIENT)
Dept: FAMILY MEDICINE | Facility: CLINIC | Age: 64
End: 2019-03-12
Payer: COMMERCIAL

## 2019-03-12 VITALS
WEIGHT: 293 LBS | HEART RATE: 96 BPM | BODY MASS INDEX: 53.92 KG/M2 | TEMPERATURE: 98 F | SYSTOLIC BLOOD PRESSURE: 120 MMHG | DIASTOLIC BLOOD PRESSURE: 70 MMHG | HEIGHT: 62 IN | OXYGEN SATURATION: 97 %

## 2019-03-12 DIAGNOSIS — H61.22 LEFT EAR IMPACTED CERUMEN: ICD-10-CM

## 2019-03-12 DIAGNOSIS — R73.03 PREDIABETES: ICD-10-CM

## 2019-03-12 DIAGNOSIS — C54.1 ENDOMETRIAL CANCER: ICD-10-CM

## 2019-03-12 DIAGNOSIS — G47.33 OSA ON CPAP: ICD-10-CM

## 2019-03-12 DIAGNOSIS — Z01.818 PRE-OP EVALUATION: Primary | ICD-10-CM

## 2019-03-12 DIAGNOSIS — I10 ESSENTIAL HYPERTENSION: ICD-10-CM

## 2019-03-12 PROCEDURE — 3008F PR BODY MASS INDEX (BMI) DOCUMENTED: ICD-10-PCS | Mod: CPTII,S$GLB,, | Performed by: FAMILY MEDICINE

## 2019-03-12 PROCEDURE — 3074F SYST BP LT 130 MM HG: CPT | Mod: CPTII,S$GLB,, | Performed by: FAMILY MEDICINE

## 2019-03-12 PROCEDURE — 69209 REMOVE IMPACTED EAR WAX UNI: CPT | Mod: LT,S$GLB,, | Performed by: FAMILY MEDICINE

## 2019-03-12 PROCEDURE — 93000 ELECTROCARDIOGRAM COMPLETE: CPT | Mod: S$GLB,,, | Performed by: INTERNAL MEDICINE

## 2019-03-12 PROCEDURE — 3008F BODY MASS INDEX DOCD: CPT | Mod: CPTII,S$GLB,, | Performed by: FAMILY MEDICINE

## 2019-03-12 PROCEDURE — 93000 EKG 12-LEAD: ICD-10-PCS | Mod: S$GLB,,, | Performed by: INTERNAL MEDICINE

## 2019-03-12 PROCEDURE — 99214 PR OFFICE/OUTPT VISIT, EST, LEVL IV, 30-39 MIN: ICD-10-PCS | Mod: 25,S$GLB,, | Performed by: FAMILY MEDICINE

## 2019-03-12 PROCEDURE — 69209 PR REMOVAL IMPACTED CERUMEN USING IRRIGATION/LAVAGE, UNILATERAL: ICD-10-PCS | Mod: LT,S$GLB,, | Performed by: FAMILY MEDICINE

## 2019-03-12 PROCEDURE — 3078F PR MOST RECENT DIASTOLIC BLOOD PRESSURE < 80 MM HG: ICD-10-PCS | Mod: CPTII,S$GLB,, | Performed by: FAMILY MEDICINE

## 2019-03-12 PROCEDURE — 99999 PR PBB SHADOW E&M-EST. PATIENT-LVL III: CPT | Mod: PBBFAC,,, | Performed by: FAMILY MEDICINE

## 2019-03-12 PROCEDURE — 99999 PR PBB SHADOW E&M-EST. PATIENT-LVL III: ICD-10-PCS | Mod: PBBFAC,,, | Performed by: FAMILY MEDICINE

## 2019-03-12 PROCEDURE — 99214 OFFICE O/P EST MOD 30 MIN: CPT | Mod: 25,S$GLB,, | Performed by: FAMILY MEDICINE

## 2019-03-12 PROCEDURE — 3074F PR MOST RECENT SYSTOLIC BLOOD PRESSURE < 130 MM HG: ICD-10-PCS | Mod: CPTII,S$GLB,, | Performed by: FAMILY MEDICINE

## 2019-03-12 PROCEDURE — 3078F DIAST BP <80 MM HG: CPT | Mod: CPTII,S$GLB,, | Performed by: FAMILY MEDICINE

## 2019-03-12 NOTE — ASSESSMENT & PLAN NOTE
Pt is currently stable on medication regimen.  Continue current therapy as scheduled.  Contact office with any questions about adjustments on medications.   -  Will need to be on CPAP or BIPAP after surgery.

## 2019-03-12 NOTE — ASSESSMENT & PLAN NOTE
Blood pressure well controlled.  Pt is currently stable on medication regimen.  Continue current therapy as scheduled.  Contact office with any questions about adjustments on medications.

## 2019-03-12 NOTE — ASSESSMENT & PLAN NOTE
Scheduled to have surgery for robotic hysterectomy and oophorectomy.      Medically necessary for CT at this time.  This would assist with surgical procedure and extension of endometrial cancer.  Concerned for lymphadenopathy with recent diagnosis.

## 2019-03-13 ENCOUNTER — TELEPHONE (OUTPATIENT)
Dept: GYNECOLOGIC ONCOLOGY | Facility: CLINIC | Age: 64
End: 2019-03-13

## 2019-03-14 ENCOUNTER — HOSPITAL ENCOUNTER (OUTPATIENT)
Dept: RADIOLOGY | Facility: HOSPITAL | Age: 64
Discharge: HOME | End: 2019-03-14
Attending: OBSTETRICS & GYNECOLOGY
Payer: COMMERCIAL

## 2019-03-14 ENCOUNTER — HOSPITAL ENCOUNTER (OUTPATIENT)
Dept: RADIOLOGY | Facility: HOSPITAL | Age: 64
Discharge: HOME | End: 2019-03-14
Attending: FAMILY MEDICINE
Payer: COMMERCIAL

## 2019-03-14 DIAGNOSIS — R73.03 PREDIABETES: ICD-10-CM

## 2019-03-14 DIAGNOSIS — I10 ESSENTIAL HYPERTENSION: ICD-10-CM

## 2019-03-14 DIAGNOSIS — Z01.818 PRE-OP EVALUATION: ICD-10-CM

## 2019-03-14 DIAGNOSIS — D49.59 ENDOMETRIAL NEOPLASM: ICD-10-CM

## 2019-03-14 PROCEDURE — 25500020 PHARM REV CODE 255: Performed by: OBSTETRICS & GYNECOLOGY

## 2019-03-14 PROCEDURE — 74177 CT ABD & PELVIS W/CONTRAST: CPT | Mod: 26,,, | Performed by: RADIOLOGY

## 2019-03-14 PROCEDURE — 74177 CT ABD & PELVIS W/CONTRAST: CPT | Mod: TC

## 2019-03-14 PROCEDURE — 71046 XR CHEST PA AND LATERAL: ICD-10-PCS | Mod: 26,,, | Performed by: RADIOLOGY

## 2019-03-14 PROCEDURE — 71046 X-RAY EXAM CHEST 2 VIEWS: CPT | Mod: TC,FY

## 2019-03-14 PROCEDURE — 71046 X-RAY EXAM CHEST 2 VIEWS: CPT | Mod: 26,,, | Performed by: RADIOLOGY

## 2019-03-14 PROCEDURE — 74177 CT ABDOMEN PELVIS WITH CONTRAST: ICD-10-PCS | Mod: 26,,, | Performed by: RADIOLOGY

## 2019-03-14 RX ADMIN — IOHEXOL 15 ML: 300 INJECTION, SOLUTION INTRAVENOUS at 04:03

## 2019-03-14 RX ADMIN — IOHEXOL 100 ML: 350 INJECTION, SOLUTION INTRAVENOUS at 04:03

## 2019-03-15 ENCOUNTER — PATIENT MESSAGE (OUTPATIENT)
Dept: GYNECOLOGIC ONCOLOGY | Facility: CLINIC | Age: 64
End: 2019-03-15

## 2019-03-18 ENCOUNTER — TELEPHONE (OUTPATIENT)
Dept: GYNECOLOGIC ONCOLOGY | Facility: CLINIC | Age: 64
End: 2019-03-18

## 2019-03-18 NOTE — TELEPHONE ENCOUNTER
03/18/19 advised pt nothing to eat or drink after midnight and the am of surgery. Arrival time is 5:30am. NILDA/MA

## 2019-03-18 NOTE — TELEPHONE ENCOUNTER
----- Message from Brooklynn Cruz MA sent at 3/18/2019  2:08 PM CDT -----  Contact: KIMMY REYNA [[id number]]  See message below and contact patient  ----- Message -----  From: Florencio Lomeli  Sent: 3/18/2019   1:56 PM  To: Stiven Fung Staff    Name of Who is Calling: KIMMY REYNA [[id number]]      What is the request in detail: Patient would like to know what time to stop eating for procedure tomorrow and last time to take medication. Please advise      Can the clinic reply by [facility]: no      What [facility] to [facility] Back [facility]: [phone]

## 2019-03-19 ENCOUNTER — HOSPITAL ENCOUNTER (OUTPATIENT)
Facility: HOSPITAL | Age: 64
Discharge: HOME | End: 2019-03-20
Attending: OBSTETRICS & GYNECOLOGY | Admitting: OBSTETRICS & GYNECOLOGY
Payer: COMMERCIAL

## 2019-03-19 ENCOUNTER — ANESTHESIA (OUTPATIENT)
Dept: SURGERY | Facility: HOSPITAL | Age: 64
End: 2019-03-19
Payer: COMMERCIAL

## 2019-03-19 DIAGNOSIS — C54.1 ENDOMETRIAL CANCER: ICD-10-CM

## 2019-03-19 DIAGNOSIS — Z90.710 S/P LAPAROSCOPIC HYSTERECTOMY: Primary | ICD-10-CM

## 2019-03-19 LAB
ABO + RH BLD: NORMAL
BLD GP AB SCN CELLS X3 SERPL QL: NORMAL
POCT GLUCOSE: 107 MG/DL (ref 70–110)

## 2019-03-19 PROCEDURE — 27201423 OPTIME MED/SURG SUP & DEVICES STERILE SUPPLY: Performed by: OBSTETRICS & GYNECOLOGY

## 2019-03-19 PROCEDURE — 25000003 PHARM REV CODE 250: Performed by: STUDENT IN AN ORGANIZED HEALTH CARE EDUCATION/TRAINING PROGRAM

## 2019-03-19 PROCEDURE — 63600175 PHARM REV CODE 636 W HCPCS: Performed by: NURSE ANESTHETIST, CERTIFIED REGISTERED

## 2019-03-19 PROCEDURE — 58573 TLH W/T/O UTERUS OVER 250 G: CPT | Mod: AS,22,, | Performed by: NURSE PRACTITIONER

## 2019-03-19 PROCEDURE — D9220A PRA ANESTHESIA: Mod: ANES,,, | Performed by: ANESTHESIOLOGY

## 2019-03-19 PROCEDURE — 27000221 HC OXYGEN, UP TO 24 HOURS

## 2019-03-19 PROCEDURE — 88309 TISSUE EXAM BY PATHOLOGIST: CPT | Mod: 26,,, | Performed by: PATHOLOGY

## 2019-03-19 PROCEDURE — 94761 N-INVAS EAR/PLS OXIMETRY MLT: CPT

## 2019-03-19 PROCEDURE — 37000008 HC ANESTHESIA 1ST 15 MINUTES: Performed by: OBSTETRICS & GYNECOLOGY

## 2019-03-19 PROCEDURE — 58573 PR LAPAROSCOPY TOT HYSTERECTOMY UTERUS >250 GRAM W TUBE/OVARY: ICD-10-PCS | Mod: AS,22,, | Performed by: NURSE PRACTITIONER

## 2019-03-19 PROCEDURE — D9220A PRA ANESTHESIA: Mod: CRNA,,, | Performed by: NURSE ANESTHETIST, CERTIFIED REGISTERED

## 2019-03-19 PROCEDURE — 88342 TISSUE SPECIMEN TO PATHOLOGY - SURGERY: ICD-10-PCS | Mod: 26,,, | Performed by: PATHOLOGY

## 2019-03-19 PROCEDURE — D9220A PRA ANESTHESIA: ICD-10-PCS | Mod: ANES,,, | Performed by: ANESTHESIOLOGY

## 2019-03-19 PROCEDURE — 58573 PR LAPAROSCOPY TOT HYSTERECTOMY UTERUS >250 GRAM W TUBE/OVARY: ICD-10-PCS | Mod: 22,,, | Performed by: OBSTETRICS & GYNECOLOGY

## 2019-03-19 PROCEDURE — 36000713 HC OR TIME LEV V EA ADD 15 MIN: Performed by: OBSTETRICS & GYNECOLOGY

## 2019-03-19 PROCEDURE — 71000039 HC RECOVERY, EACH ADD'L HOUR: Performed by: OBSTETRICS & GYNECOLOGY

## 2019-03-19 PROCEDURE — 20600001 HC STEP DOWN PRIVATE ROOM

## 2019-03-19 PROCEDURE — 88309 TISSUE SPECIMEN TO PATHOLOGY - SURGERY: ICD-10-PCS | Mod: 26,,, | Performed by: PATHOLOGY

## 2019-03-19 PROCEDURE — 25000242 PHARM REV CODE 250 ALT 637 W/ HCPCS: Performed by: NURSE ANESTHETIST, CERTIFIED REGISTERED

## 2019-03-19 PROCEDURE — 25000003 PHARM REV CODE 250: Performed by: NURSE ANESTHETIST, CERTIFIED REGISTERED

## 2019-03-19 PROCEDURE — 88341 IMHCHEM/IMCYTCHM EA ADD ANTB: CPT | Mod: 26,,, | Performed by: PATHOLOGY

## 2019-03-19 PROCEDURE — 25000003 PHARM REV CODE 250: Performed by: OBSTETRICS & GYNECOLOGY

## 2019-03-19 PROCEDURE — 88342 IMHCHEM/IMCYTCHM 1ST ANTB: CPT | Performed by: PATHOLOGY

## 2019-03-19 PROCEDURE — 86901 BLOOD TYPING SEROLOGIC RH(D): CPT

## 2019-03-19 PROCEDURE — 36000712 HC OR TIME LEV V 1ST 15 MIN: Performed by: OBSTETRICS & GYNECOLOGY

## 2019-03-19 PROCEDURE — 88341 TISSUE SPECIMEN TO PATHOLOGY - SURGERY: ICD-10-PCS | Mod: 26,,, | Performed by: PATHOLOGY

## 2019-03-19 PROCEDURE — 82962 GLUCOSE BLOOD TEST: CPT | Performed by: OBSTETRICS & GYNECOLOGY

## 2019-03-19 PROCEDURE — 99900035 HC TECH TIME PER 15 MIN (STAT)

## 2019-03-19 PROCEDURE — 37000009 HC ANESTHESIA EA ADD 15 MINS: Performed by: OBSTETRICS & GYNECOLOGY

## 2019-03-19 PROCEDURE — 71000033 HC RECOVERY, INTIAL HOUR: Performed by: OBSTETRICS & GYNECOLOGY

## 2019-03-19 PROCEDURE — 94660 CPAP INITIATION&MGMT: CPT

## 2019-03-19 PROCEDURE — D9220A PRA ANESTHESIA: ICD-10-PCS | Mod: CRNA,,, | Performed by: NURSE ANESTHETIST, CERTIFIED REGISTERED

## 2019-03-19 PROCEDURE — 88342 IMHCHEM/IMCYTCHM 1ST ANTB: CPT | Mod: 26,,, | Performed by: PATHOLOGY

## 2019-03-19 PROCEDURE — 58573 TLH W/T/O UTERUS OVER 250 G: CPT | Mod: 22,,, | Performed by: OBSTETRICS & GYNECOLOGY

## 2019-03-19 RX ORDER — SODIUM CHLORIDE 9 MG/ML
INJECTION, SOLUTION INTRAVENOUS CONTINUOUS PRN
Status: DISCONTINUED | OUTPATIENT
Start: 2019-03-19 | End: 2019-03-19

## 2019-03-19 RX ORDER — KETAMINE HCL IN 0.9 % NACL 50 MG/5 ML
SYRINGE (ML) INTRAVENOUS
Status: DISCONTINUED | OUTPATIENT
Start: 2019-03-19 | End: 2019-03-19

## 2019-03-19 RX ORDER — DIPHENHYDRAMINE HCL 25 MG
25 CAPSULE ORAL EVERY 4 HOURS PRN
Status: DISCONTINUED | OUTPATIENT
Start: 2019-03-19 | End: 2019-03-20 | Stop reason: HOSPADM

## 2019-03-19 RX ORDER — PROPOFOL 10 MG/ML
VIAL (ML) INTRAVENOUS
Status: DISCONTINUED | OUTPATIENT
Start: 2019-03-19 | End: 2019-03-19

## 2019-03-19 RX ORDER — LIDOCAINE HCL/PF 100 MG/5ML
SYRINGE (ML) INTRAVENOUS
Status: DISCONTINUED | OUTPATIENT
Start: 2019-03-19 | End: 2019-03-19

## 2019-03-19 RX ORDER — MUPIROCIN 20 MG/G
1 OINTMENT TOPICAL 2 TIMES DAILY
Status: DISCONTINUED | OUTPATIENT
Start: 2019-03-19 | End: 2019-03-20 | Stop reason: HOSPADM

## 2019-03-19 RX ORDER — HYDROMORPHONE HYDROCHLORIDE 1 MG/ML
0.2 INJECTION, SOLUTION INTRAMUSCULAR; INTRAVENOUS; SUBCUTANEOUS EVERY 5 MIN PRN
Status: DISCONTINUED | OUTPATIENT
Start: 2019-03-19 | End: 2019-03-19 | Stop reason: HOSPADM

## 2019-03-19 RX ORDER — HYDROMORPHONE HYDROCHLORIDE 1 MG/ML
1 INJECTION, SOLUTION INTRAMUSCULAR; INTRAVENOUS; SUBCUTANEOUS EVERY 4 HOURS PRN
Status: DISCONTINUED | OUTPATIENT
Start: 2019-03-19 | End: 2019-03-20 | Stop reason: HOSPADM

## 2019-03-19 RX ORDER — HYDROCODONE BITARTRATE AND ACETAMINOPHEN 10; 325 MG/1; MG/1
1 TABLET ORAL EVERY 4 HOURS PRN
Status: DISCONTINUED | OUTPATIENT
Start: 2019-03-19 | End: 2019-03-20 | Stop reason: HOSPADM

## 2019-03-19 RX ORDER — LIDOCAINE HYDROCHLORIDE 10 MG/ML
1 INJECTION, SOLUTION EPIDURAL; INFILTRATION; INTRACAUDAL; PERINEURAL ONCE
Status: COMPLETED | OUTPATIENT
Start: 2019-03-19 | End: 2019-03-19

## 2019-03-19 RX ORDER — DIPHENHYDRAMINE HYDROCHLORIDE 50 MG/ML
25 INJECTION INTRAMUSCULAR; INTRAVENOUS EVERY 4 HOURS PRN
Status: DISCONTINUED | OUTPATIENT
Start: 2019-03-19 | End: 2019-03-20 | Stop reason: HOSPADM

## 2019-03-19 RX ORDER — MIDAZOLAM HYDROCHLORIDE 1 MG/ML
INJECTION, SOLUTION INTRAMUSCULAR; INTRAVENOUS
Status: DISCONTINUED | OUTPATIENT
Start: 2019-03-19 | End: 2019-03-19

## 2019-03-19 RX ORDER — FAMOTIDINE 20 MG/1
20 TABLET, FILM COATED ORAL 2 TIMES DAILY
Status: DISCONTINUED | OUTPATIENT
Start: 2019-03-19 | End: 2019-03-20 | Stop reason: HOSPADM

## 2019-03-19 RX ORDER — GLYCOPYRROLATE 0.2 MG/ML
INJECTION INTRAMUSCULAR; INTRAVENOUS
Status: DISCONTINUED | OUTPATIENT
Start: 2019-03-19 | End: 2019-03-19

## 2019-03-19 RX ORDER — ALBUTEROL SULFATE 90 UG/1
AEROSOL, METERED RESPIRATORY (INHALATION)
Status: DISCONTINUED | OUTPATIENT
Start: 2019-03-19 | End: 2019-03-19

## 2019-03-19 RX ORDER — SODIUM CHLORIDE 9 MG/ML
INJECTION, SOLUTION INTRAVENOUS CONTINUOUS
Status: DISCONTINUED | OUTPATIENT
Start: 2019-03-19 | End: 2019-03-20

## 2019-03-19 RX ORDER — ROCURONIUM BROMIDE 10 MG/ML
INJECTION, SOLUTION INTRAVENOUS
Status: DISCONTINUED | OUTPATIENT
Start: 2019-03-19 | End: 2019-03-19

## 2019-03-19 RX ORDER — CEFAZOLIN SODIUM 1 G/3ML
INJECTION, POWDER, FOR SOLUTION INTRAMUSCULAR; INTRAVENOUS
Status: DISCONTINUED | OUTPATIENT
Start: 2019-03-19 | End: 2019-03-19

## 2019-03-19 RX ORDER — MUPIROCIN 20 MG/G
OINTMENT TOPICAL
Status: DISCONTINUED | OUTPATIENT
Start: 2019-03-19 | End: 2019-03-19

## 2019-03-19 RX ORDER — BISACODYL 10 MG
10 SUPPOSITORY, RECTAL RECTAL DAILY PRN
Status: DISCONTINUED | OUTPATIENT
Start: 2019-03-19 | End: 2019-03-20 | Stop reason: HOSPADM

## 2019-03-19 RX ORDER — FENTANYL CITRATE 50 UG/ML
INJECTION, SOLUTION INTRAMUSCULAR; INTRAVENOUS
Status: DISCONTINUED | OUTPATIENT
Start: 2019-03-19 | End: 2019-03-19

## 2019-03-19 RX ORDER — IBUPROFEN 600 MG/1
600 TABLET ORAL EVERY 6 HOURS
Status: DISCONTINUED | OUTPATIENT
Start: 2019-03-19 | End: 2019-03-20 | Stop reason: HOSPADM

## 2019-03-19 RX ORDER — SUCCINYLCHOLINE CHLORIDE 20 MG/ML
INJECTION INTRAMUSCULAR; INTRAVENOUS
Status: DISCONTINUED | OUTPATIENT
Start: 2019-03-19 | End: 2019-03-19

## 2019-03-19 RX ORDER — AMOXICILLIN 250 MG
1 CAPSULE ORAL 2 TIMES DAILY
Status: DISCONTINUED | OUTPATIENT
Start: 2019-03-19 | End: 2019-03-20 | Stop reason: HOSPADM

## 2019-03-19 RX ORDER — ONDANSETRON 8 MG/1
8 TABLET, ORALLY DISINTEGRATING ORAL EVERY 8 HOURS PRN
Status: DISCONTINUED | OUTPATIENT
Start: 2019-03-19 | End: 2019-03-20 | Stop reason: HOSPADM

## 2019-03-19 RX ORDER — ONDANSETRON 2 MG/ML
INJECTION INTRAMUSCULAR; INTRAVENOUS
Status: DISCONTINUED | OUTPATIENT
Start: 2019-03-19 | End: 2019-03-19

## 2019-03-19 RX ORDER — HYDROCODONE BITARTRATE AND ACETAMINOPHEN 5; 325 MG/1; MG/1
1 TABLET ORAL EVERY 4 HOURS PRN
Status: DISCONTINUED | OUTPATIENT
Start: 2019-03-19 | End: 2019-03-20 | Stop reason: HOSPADM

## 2019-03-19 RX ORDER — ACETAMINOPHEN 10 MG/ML
INJECTION, SOLUTION INTRAVENOUS
Status: DISCONTINUED | OUTPATIENT
Start: 2019-03-19 | End: 2019-03-19

## 2019-03-19 RX ADMIN — LIDOCAINE HYDROCHLORIDE 0.1 MG: 10 INJECTION, SOLUTION EPIDURAL; INFILTRATION; INTRACAUDAL; PERINEURAL at 06:03

## 2019-03-19 RX ADMIN — ROCURONIUM BROMIDE 40 MG: 10 INJECTION, SOLUTION INTRAVENOUS at 07:03

## 2019-03-19 RX ADMIN — HYDROCODONE BITARTRATE AND ACETAMINOPHEN 1 TABLET: 10; 325 TABLET ORAL at 10:03

## 2019-03-19 RX ADMIN — FENTANYL CITRATE 50 MCG: 50 INJECTION, SOLUTION INTRAMUSCULAR; INTRAVENOUS at 07:03

## 2019-03-19 RX ADMIN — SODIUM CHLORIDE, SODIUM GLUCONATE, SODIUM ACETATE, POTASSIUM CHLORIDE, MAGNESIUM CHLORIDE, SODIUM PHOSPHATE, DIBASIC, AND POTASSIUM PHOSPHATE: .53; .5; .37; .037; .03; .012; .00082 INJECTION, SOLUTION INTRAVENOUS at 08:03

## 2019-03-19 RX ADMIN — PROPOFOL 200 MG: 10 INJECTION, EMULSION INTRAVENOUS at 07:03

## 2019-03-19 RX ADMIN — MUPIROCIN: 20 OINTMENT TOPICAL at 06:03

## 2019-03-19 RX ADMIN — STANDARDIZED SENNA CONCENTRATE AND DOCUSATE SODIUM 1 TABLET: 8.6; 5 TABLET, FILM COATED ORAL at 10:03

## 2019-03-19 RX ADMIN — SODIUM CHLORIDE: 0.9 INJECTION, SOLUTION INTRAVENOUS at 10:03

## 2019-03-19 RX ADMIN — STANDARDIZED SENNA CONCENTRATE AND DOCUSATE SODIUM 1 TABLET: 8.6; 5 TABLET, FILM COATED ORAL at 08:03

## 2019-03-19 RX ADMIN — SODIUM CHLORIDE: 0.9 INJECTION, SOLUTION INTRAVENOUS at 06:03

## 2019-03-19 RX ADMIN — SUGAMMADEX 200 MG: 100 INJECTION, SOLUTION INTRAVENOUS at 09:03

## 2019-03-19 RX ADMIN — IBUPROFEN 600 MG: 200 TABLET, FILM COATED ORAL at 11:03

## 2019-03-19 RX ADMIN — FAMOTIDINE 20 MG: 20 TABLET ORAL at 10:03

## 2019-03-19 RX ADMIN — ONDANSETRON 4 MG: 2 INJECTION INTRAMUSCULAR; INTRAVENOUS at 08:03

## 2019-03-19 RX ADMIN — HYDROCODONE BITARTRATE AND ACETAMINOPHEN 1 TABLET: 10; 325 TABLET ORAL at 03:03

## 2019-03-19 RX ADMIN — LIDOCAINE HYDROCHLORIDE 100 MG: 20 INJECTION, SOLUTION INTRAVENOUS at 07:03

## 2019-03-19 RX ADMIN — ALBUTEROL SULFATE 2 PUFF: 90 AEROSOL, METERED RESPIRATORY (INHALATION) at 06:03

## 2019-03-19 RX ADMIN — MUPIROCIN 1 G: 20 OINTMENT TOPICAL at 08:03

## 2019-03-19 RX ADMIN — SUCCINYLCHOLINE CHLORIDE 120 MG: 20 INJECTION, SOLUTION INTRAMUSCULAR; INTRAVENOUS at 07:03

## 2019-03-19 RX ADMIN — PROPOFOL 50 MG: 10 INJECTION, EMULSION INTRAVENOUS at 09:03

## 2019-03-19 RX ADMIN — MIDAZOLAM HYDROCHLORIDE 2 MG: 1 INJECTION, SOLUTION INTRAMUSCULAR; INTRAVENOUS at 06:03

## 2019-03-19 RX ADMIN — CEFAZOLIN 3 G: 330 INJECTION, POWDER, FOR SOLUTION INTRAMUSCULAR; INTRAVENOUS at 07:03

## 2019-03-19 RX ADMIN — FAMOTIDINE 20 MG: 20 TABLET ORAL at 08:03

## 2019-03-19 RX ADMIN — SODIUM CHLORIDE: 0.9 INJECTION, SOLUTION INTRAVENOUS at 05:03

## 2019-03-19 RX ADMIN — ROCURONIUM BROMIDE 10 MG: 10 INJECTION, SOLUTION INTRAVENOUS at 08:03

## 2019-03-19 RX ADMIN — GLYCOPYRROLATE 0.2 MG: 0.2 INJECTION, SOLUTION INTRAMUSCULAR; INTRAVENOUS at 06:03

## 2019-03-19 RX ADMIN — IBUPROFEN 600 MG: 200 TABLET, FILM COATED ORAL at 05:03

## 2019-03-19 RX ADMIN — ACETAMINOPHEN 1000 MG: 10 INJECTION, SOLUTION INTRAVENOUS at 08:03

## 2019-03-19 RX ADMIN — Medication 20 MG: at 08:03

## 2019-03-19 NOTE — HOSPITAL COURSE
03/19/2019 - Scheduled and uncomplicated RA-TLH/BSO  03/20/2019 - POD # 1 s/p RA-MADHAV/BSO. Meeting post-operative goals. Stable for discharge in good condition after voiding and ambulation. All milestones met. Stable for discharge.

## 2019-03-19 NOTE — ASSESSMENT & PLAN NOTE
- S/P [facility] D&C on 1/25/19.  Path from that surgery showed G2 endometrial cancer in the background of CAH  - Now s/p RA-MADHAV/BSO

## 2019-03-19 NOTE — HPI
Ms. Wilson presents for a scheduled RA-TLH/BSO secondary to Grade 2 endometrial cancer.  She initially had an EMB due to thickened endometrial stripe revealing hyperplasia.  She subsequently developed fairly heavy VB x 3 weeks and was taken to the OR for HSC D&C on 1/25/19.  Path from that surgery showed G2 endometrial cancer in the background of CAH.  Reports bleeding is better since procedure.  Never stopped bleeding with menopause. PSH significant for BTL.  FH negative for breast/colon/gyn cancer.  TVUS showed uterus was 14 x 8 cm.     CT (3/14/19)  Impression         1. History endometrial cancer.  No metastatic disease identified in the abdomen or pelvis.  2. Enlarged, lobular uterus likely indicative of fibroids, though not specific by CT.  Similar changes were reported on the prior pelvic ultrasound 01/03/2019.  3. No acute process identified in abdomen or pelvis.  Other findings as above.  4.  There are no measurable lesions per RECIST criteria.

## 2019-03-19 NOTE — OP NOTE
DATE OF PROCEDURE:  March 19, 2019     SURGEON: Stiven Geronimo       ASSISTANTS: .  Ghazala Nobles nurse practitioner serving as 1st assist, [place] Mason resident     PREOPERATIVE DIAGNOSES:  Grade 2 endometrial cancer, fibroids, BMI 60-69     POSTOPERATIVE DIAGNOSES:  Same     PROCEDURES:  Robotic-assisted laparoscopic hysterectomy and bilateral   salpingo-oophorectomy, minilap for uterine extraction     COMPLICATIONS:  None     ESTIMATED BLOOD LOSS:  Less than 50 cc     ANESTHESIA:  GETA     INTRAOPERATIVE FINDINGS:  No obvious spread of disease, 14 week size uterus    PROCEDURE IN DETAIL: Informed consent was obtained and the patient was taken to   the Operating Suite.  General anesthesia was administered.  Once felt to be   adequate, she was placed in dorsal lithotomy position with her arms tucked.  The   abdomen and pelvis were prepped and draped in the usual fashion and a speculum   was placed in the vagina.  The cervix was visualized, grasped with a   single-tooth tenaculum and the uterus sounded to approximately 14 cm.    Serial dilation of cervix was performed and a medium  VCare manipulator was   placed without difficulty.  Espinosa catheter was placed to gravity drainage and   attention was turned to the abdominal portion of the procedure.  The patient had   a known small umbilical hernia.  Therefore, an incision was made above the   umbilicus and a Veress needle was placed through this.  Pneumoperitoneum was   obtained with carbon dioxide and once this was felt to be adequate, an 8 mm   incision was made and a robotic trocar was placed through this.  Intraperitoneal   placement was confirmed with the camera.  Lateral robotic trocars x3 were   placed through 8 mm incisions.  A right upper quadrant 8 mm AirSeal accessory   port was placed.  The patient was placed in deep Trendelenburg.  The robot was   docked and instruments were passed in the operative field.  The patient's size   and redundant bowel made  visualization somewhat difficult.  Care was taken to   avoid injuring the bladder or bowel.  The patient also had some prolapse, which   was managed with the manipulator.  Attention was first turned to the left side   where the left round ligament was transected after sacrificing with bipolar   cautery.  The anterior and posterior leaflets of the broad ligament were opened.    The pararectal space was developed.  The ureter was identified and a window   was made beneath the infundibulopelvic ligament.  This was sacrificed with   bipolar cautery and transected.  The medial fold of the peritoneum was then   taken to the uterosacrals.  Attention was turned to the right side, which was   taken down in an identical manner.  Anteriorly, the vesicouterine peritoneum was   incised and the bladder was mobilized inferiorly over the ring.  A 10 o'clock   to 2 o'clock colpotomy was performed.  Posteriorly, a 4 o'clock to 8 o'clock   colpotomy was performed and bilateral cardinal ligaments and uterine vessels   skeletonized and their peritoneal attachments sacrificed with bipolar cautery   and transected.  Circumferential colpotomy was completed and the uterus, cervix,   bilateral tubes and ovaries were removed.  The cuff was then closed with a 0   PDS suture tied in the midline.  The pelvis was irrigated and noted to be   hemostatic.  Jared was applied and once hemostasis was confirmed, the   instruments were removed, the robot was undocked and the pneumoperitoneum was   evacuated.  The patient was flattened.  All ports were removed and port sites   were inspected and made hemostatic with electrocautery and closed with   subcuticular 4-0 Monocryl suture.  Minilap was performed in the midline through the camera port incision.  The uteerus was removed, the fascia grasped with Kocher clamps and closed with a running, non-locking 0 PDS suture.  Subcutaneous tissues were made hemostatic with electrocautery and the skin was closed  with 4-0 monocryl suture. Steri-Strips and pressure dressing were applied and the patient was awoken and taken to Recovery Room in stable   condition.  Of note, I was present for and performed all key aspects of   procedure.  Ghazala Nobles's expertise was needed as there was no qualified   resident available.

## 2019-03-19 NOTE — ASSESSMENT & PLAN NOTE
- Will hold HCTZ during the post-op period  - Hydralazine PRN for systolics > 180  - BP: (114-151)/(58-88) 135/65

## 2019-03-19 NOTE — TRANSFER OF CARE
"Anesthesia Transfer of Care Note    Patient: Hilary Wilson    Procedure(s) Performed: Procedure(s) (LRB):  XI ROBOTIC HYSTERECTOMY (N/A)  XI ROBOTIC SALPINGO-OOPHORECTOMY (Bilateral)    Patient location: PACU    Anesthesia Type: general    Transport from OR: Transported from OR on 6-10 L/min O2 by face mask with adequate spontaneous ventilation    Post pain: adequate analgesia    Post assessment: no apparent anesthetic complications and tolerated procedure well    Post vital signs: stable    Level of consciousness: awake    Nausea/Vomiting: no nausea/vomiting    Transfer of care protocol was followed      Last vitals:   Visit Vitals  BP (!) 145/80 (BP Location: Left arm, Patient Position: Lying)   Pulse 94   Temp 36.9 °C (98.4 °F) (Oral)   Resp (!) 21   Ht 5' 2" (1.575 m)   Wt (!) 154.2 kg (340 lb)   LMP 05/23/2014   SpO2 100%   Breastfeeding? No   BMI 62.19 kg/m²     "

## 2019-03-19 NOTE — H&P
Subjective:      Patient ID: Hilary Wilson is a 63 y.o. female.     Chief Complaint: Endometrial Cancer (Referred by Dr. Flores)        HPI  Here today at the request of Dr. Nolasco due to recently detected endometrial cancer.  She initially had an EMB due to thickened endometrial stripe revealing hyperplasia.  She subsequently developed fairly heavy VB x 3 weeks and was taken to the OR for HSC D&C on 1/25/19.  Path from that surgery showed G2 endometrial cancer in the background of CAH.  Reports bleeding is better since procedure.  Never stopped bleeding with menopause. PSH significant for BTL.  FH negative for breast/colon/gyn cancer.  TVUS showed uterus was 14 x 8 cm.    CT (3/14/19)  Impression       1. History endometrial cancer.  No metastatic disease identified in the abdomen or pelvis.  2. Enlarged, lobular uterus likely indicative of fibroids, though not specific by CT.  Similar changes were reported on the prior pelvic ultrasound 01/03/2019.  3. No acute process identified in abdomen or pelvis.  Other findings as above.  4.  There are no measurable lesions per RECIST criteria.       Review of Systems   Constitutional: Negative for activity change, appetite change, chills, fatigue and fever.   HENT: Negative for hearing loss, mouth sores, nosebleeds, sore throat and tinnitus.    Eyes: Negative for visual disturbance.   Respiratory: Negative for cough, chest tightness, shortness of breath and wheezing.    Cardiovascular: Negative for chest pain and leg swelling.   Gastrointestinal: Negative for abdominal distention, abdominal pain, blood in stool, constipation, diarrhea, nausea and vomiting.   Genitourinary: Negative for dysuria, flank pain, frequency, hematuria, pelvic pain, vaginal bleeding, vaginal discharge and vaginal pain.   Musculoskeletal: Negative for arthralgias and back pain.   Skin: Negative for rash.   Neurological: Negative for dizziness, seizures, syncope, weakness and  numbness.   Hematological: Does not bruise/bleed easily.   Psychiatric/Behavioral: Negative for confusion and sleep disturbance. The patient is not nervous/anxious.               Past Medical History:   Diagnosis Date    Asthma      Hyperlipidemia      Hypertension      JOSUÉ on CPAP      Uterine cancer      Vaginal delivery       x3            Past Surgical History:   Procedure Laterality Date    BREAST BIOPSY Left      COLONOSCOPY N/A 11/3/2016     Performed by Zhang Diez MD at [facility] END[facility]    HYSTEROSCOPY, WITH DILATION AND CURETTAGE OF UTERUS N/A 1/25/2019     Performed by Elías Nolasco MD at [facility] OR    mass removal        TUBAL LIGATION                Family History   Problem Relation Age of Onset    Hypertension Mother      Kidney disease Mother      Diabetes Father      Stroke Father      Heart disease Sister 49    Kidney disease Sister      Stroke Sister           in 50s      Social History               Socioeconomic History    Marital status:        Spouse name: Not on file    Number of children: Not on file    Years of education: Not on file    Highest education level: Not on file   Social Needs    Financial resource strain: Not on file    Food insecurity - worry: Not on file    Food insecurity - inability: Not on file    Transportation needs - medical: Not on file    Transportation needs - non-medical: Not on file   Occupational History    Not on file   Tobacco Use    Smoking status: Never Smoker    Smokeless tobacco: Never Used   Substance and Sexual Activity    Alcohol use: No    Drug use: No    Sexual activity: Yes       Partners: Male   Other Topics Concern    Not on file   Social History Narrative    Not on file              Current Outpatient Medications   Medication Sig    albuterol (PROVENTIL/VENTOLIN HFA) 90 mcg/actuation inhaler INHALE 2 PUFFS ORALLY EVERY SIX HOURS AS NEEDED FOR WHEEZING    etodolac (LODINE) 400 MG tablet Take 1 tablet  (400 mg total) by mouth 2 (two) times daily.    furosemide (LASIX) 20 MG tablet Take 1 tablet (20 mg total) by mouth once daily.    hydroCHLOROthiazide (HYDRODIURIL) 25 MG tablet TAKE 1 TABLET BY MOUTH ONE TIME DAILY    HYDROcodone-acetaminophen (NORCO) 5-325 mg per tablet Take 1 tablet by mouth every 4 (four) hours as needed for Pain.    ibuprofen (ADVIL,MOTRIN) 800 MG tablet Take 1 tablet (800 mg total) by mouth every 8 (eight) hours as needed for Pain.    mupirocin (BACTROBAN) 2 % ointment Apply topically 3 (three) times daily.    phentermine 15 MG capsule Take 1 capsule (15 mg total) by mouth every morning.    potassium chloride (MICRO-K) 8 mEq CpSR Take 1 capsule (8 mEq total) by mouth once daily.      No current facility-administered medications for this visit.       Review of patient's allergies indicates:  No Known Allergies     Objective:   Physical Exam:   Constitutional: She is oriented to person, place, and time. She appears well-developed and well-nourished. No distress.    HENT:   Head: Normocephalic and atraumatic.    Eyes: No scleral icterus.    Neck: Normal range of motion. Neck supple.    Cardiovascular: Normal rate and intact distal pulses.  Exam reveals no cyanosis and no edema.     Pulmonary/Chest: Effort normal. No respiratory distress. She exhibits no tenderness.         Abdominal: Soft. Normal appearance. She exhibits no distension, no fluid wave, no ascites and no mass. There is no tenderness. There is no rigidity, no rebound and no guarding. No hernia.     Genitourinary: Rectum normal and vagina normal. Pelvic exam was performed with patient supine. There is no rash, tenderness or lesion on the right labia. There is no rash, tenderness or lesion on the left labia. Uterus is enlarged. Uterus is not deviated, not fixed, not tender, not hosting fibroids and not experiencing uterine prolapse. Cervix is normal. Right adnexum displays no mass, no tenderness and no fullness. Left adnexum  displays no mass, no tenderness and no fullness. No bleeding in the vagina. No vaginal discharge found. Labial bartholins normal.Cervix exhibits no motion tenderness, no discharge and no friability.        Uterus Size: 14 cm   Musculoskeletal: Normal range of motion and moves all extremeties. She exhibits no edema.      Lymphadenopathy:     She has no cervical adenopathy.        Right: No inguinal adenopathy present.        Left: No inguinal adenopathy present.    Neurological: She is alert and oriented to person, place, and time.    Skin: Skin is warm. No rash noted. No cyanosis or erythema.    Psychiatric: She has a normal mood and affect. Thought content normal.         Assessment:      1. BMI 60.0-69.9, adult    2. Endometrial cancer          Plan:       Discussed biopsy results and need for hysterectomy.  Recommended RALH/BSO/Poss staging if technically feasible.  The risks, benefits, and indications of the procedure were discussed with the patient and her .  These included bleeding, infection, damage to surrounding tissues, conversion to laparotomy, and the possibility of major complications including death.  She voiced understanding, all questions were answered and consents were signed.     Ashleigh Thomas M.D.  PGY2 OB/GYN

## 2019-03-19 NOTE — OPERATIVE NOTE ADDENDUM
Certification of Assistant at Surgery       Surgery Date: 3/19/2019     Participating Surgeons:  Surgeon(s) and Role:     * Stiven Geronimo MD - Primary    Procedures:  Procedure(s) (LRB):  XI ROBOTIC HYSTERECTOMY (N/A)  XI ROBOTIC SALPINGO-OOPHORECTOMY (Bilateral)    Assistant Surgeon's Certification of Necessity:  I understand that section 1842 (b) (6) (d) of the Social Security Act generally prohibits Medicare Part B reasonable charge payment for the services of assistants at surgery in teaching hospitals when qualified residents are available to furnish such services. I certify that the services for which payment is claimed were medically necessary, and that no qualified resident was available to perform the services. I further understand that these services are subject to post-payment review by the Medicare carrier.      Ghazala Nobles NP    03/19/2019  9:06 AM

## 2019-03-19 NOTE — NURSING TRANSFER
Nursing Transfer Note      3/19/2019     Transfer To: 855a    Transfer via stretcher    Transfer with khalif    Transported by pct    Medicines sent: no    Chart send with patient: Yes    Notified: spouse

## 2019-03-19 NOTE — PLAN OF CARE
Robot time out completed with pre incision time out.  All DaVinci instruments inspected before case by Esther Espinoza.  All instruments appear to be intact.

## 2019-03-19 NOTE — ASSESSMENT & PLAN NOTE
- Standard post-op advances  - Tolerating Regular diet   - Remove gonzalez in the AM > Void trial > Encourage Ambulation  - Ibuprofen and Norco for Pain Relief  - I.S.   - TEDs and SCD's   - EBL < 50 cc. No AM CBC warranted  - Will D/C IVF @ 125  - Patient to D/C home once has voided successfully and ambulated in the room.

## 2019-03-19 NOTE — PLAN OF CARE
All DaVinci instruments inspected after case by Esther Espinoza.  All Instruments appear intact.  Instrument lives checked by Quita Fenton

## 2019-03-20 ENCOUNTER — TELEPHONE (OUTPATIENT)
Dept: GYNECOLOGIC ONCOLOGY | Facility: CLINIC | Age: 64
End: 2019-03-20

## 2019-03-20 VITALS
WEIGHT: 293 LBS | TEMPERATURE: 99 F | BODY MASS INDEX: 53.92 KG/M2 | RESPIRATION RATE: 17 BRPM | SYSTOLIC BLOOD PRESSURE: 135 MMHG | HEART RATE: 86 BPM | HEIGHT: 62 IN | OXYGEN SATURATION: 92 % | DIASTOLIC BLOOD PRESSURE: 65 MMHG

## 2019-03-20 PROCEDURE — 94761 N-INVAS EAR/PLS OXIMETRY MLT: CPT

## 2019-03-20 PROCEDURE — 25000003 PHARM REV CODE 250: Performed by: STUDENT IN AN ORGANIZED HEALTH CARE EDUCATION/TRAINING PROGRAM

## 2019-03-20 PROCEDURE — 94660 CPAP INITIATION&MGMT: CPT

## 2019-03-20 PROCEDURE — 99900035 HC TECH TIME PER 15 MIN (STAT)

## 2019-03-20 RX ORDER — HYDROCODONE BITARTRATE AND ACETAMINOPHEN 5; 325 MG/1; MG/1
1 TABLET ORAL EVERY 4 HOURS PRN
Qty: 30 TABLET | Refills: 0 | Status: SHIPPED | OUTPATIENT
Start: 2019-03-20 | End: 2019-10-09

## 2019-03-20 RX ORDER — IBUPROFEN 600 MG/1
600 TABLET ORAL EVERY 6 HOURS
Qty: 60 TABLET | Refills: 1 | Status: SHIPPED | OUTPATIENT
Start: 2019-03-20 | End: 2019-10-09

## 2019-03-20 RX ADMIN — FAMOTIDINE 20 MG: 20 TABLET ORAL at 08:03

## 2019-03-20 RX ADMIN — IBUPROFEN 600 MG: 200 TABLET, FILM COATED ORAL at 05:03

## 2019-03-20 RX ADMIN — STANDARDIZED SENNA CONCENTRATE AND DOCUSATE SODIUM 1 TABLET: 8.6; 5 TABLET, FILM COATED ORAL at 08:03

## 2019-03-20 RX ADMIN — MUPIROCIN 1 G: 20 OINTMENT TOPICAL at 08:03

## 2019-03-20 NOTE — PROGRESS NOTES
Discharge instructions and prescriptions given and explained to pt.  Pt. verbalized understanding with no further questions.  Peripheral IV D/C'd with catheter tip intact.  VS WDL.  Patient is awaiting transport and leaving with family.      ROAD TEST  O=SpO2 96% on RA  A=Ambulating around room and hallway, with assistance  D=IV D/C'd  T=Tolerating regular diet  E=Voids  S=Performs self care independently  T=Teaching on wounds care complete

## 2019-03-20 NOTE — SUBJECTIVE & OBJECTIVE
Interval History: Patient is doing well this AM. Has tolerated a diet and passing flatus. Denies N/V. She denies pain this AM. Has not ambulated or voided as gonzalez was in place on rounds.      Scheduled Meds:   famotidine  20 mg Oral BID    ibuprofen  600 mg Oral Q6H    mupirocin  1 g Nasal BID    senna-docusate 8.6-50 mg  1 tablet Oral BID     Continuous Infusions:   sodium chloride 0.9% 125 mL/hr at 03/19/19 1751     PRN Meds:bisacodyl, diphenhydrAMINE, diphenhydrAMINE, HYDROcodone-acetaminophen, HYDROcodone-acetaminophen, HYDROmorphone, ondansetron, promethazine (PHENERGAN) IVPB    Review of patient's allergies indicates:  No Known Allergies    Objective:     Vital Signs (Most Recent):  Temp: 98.5 °F (36.9 °C) (03/20/19 0817)  Pulse: 86 (03/20/19 0817)  Resp: 17 (03/20/19 0817)  BP: 135/65 (03/20/19 0817)  SpO2: (!) 92 % (03/20/19 0817) Vital Signs (24h Range):  Temp:  [97.3 °F (36.3 °C)-99 °F (37.2 °C)] 98.5 °F (36.9 °C)  Pulse:  [76-99] 86  Resp:  [16-21] 17  SpO2:  [92 %-100 %] 92 %  BP: (114-151)/(58-88) 135/65     Weight: (!) 158 kg (348 lb 5.2 oz)  Body mass index is 63.71 kg/m².    Intake/Output - Last 3 Shifts       03/18 0700 - 03/19 0659 03/19 0700 - 03/20 0659 03/20 0700 - 03/21 0659    P.O.  625 250    I.V. (mL/kg)  2466.7 (15.6) 389.6 (2.5)    Total Intake(mL/kg)  3091.7 (19.6) 639.6 (4)    Urine (mL/kg/hr)  1590 (0.4)     Blood  50     Total Output  1640     Net  +1451.7 +639.6           Urine Occurrence   1 x             Physical Exam:   Constitutional: She is oriented to person, place, and time. She appears well-developed and well-nourished. No distress.    HENT:   Head: Normocephalic and atraumatic.    Eyes: Conjunctivae are normal.    Neck: Neck supple.    Cardiovascular: Normal rate, regular rhythm and intact distal pulses.     Pulmonary/Chest: Effort normal. No respiratory distress.        Abdominal: Soft. Bowel sounds are normal. She exhibits abdominal incision (4 trocar sites and  mini-lap site C/D/I with steristrips in place.  No signs of infection of inflammation. ). She exhibits no distension. There is no tenderness. There is no rebound and no guarding.                 Neurological: She is alert and oriented to person, place, and time.    Skin: Skin is warm and dry. She is not diaphoretic.    Psychiatric: She has a normal mood and affect. Her behavior is normal. Judgment and thought content normal.       Lines/Drains/Airways     Peripheral Intravenous Line                 Peripheral IV - Single Lumen 03/19/19 0652 Right Hand 1 day

## 2019-03-20 NOTE — PROGRESS NOTES
[facility]  Gynecologic Oncology  Progress Note      Patient Name: Hilary Wilson  MRN: [id number]  Admission Date: 3/19/2019  Hospital Length of Stay: 0 days  Attending Provider: Stiven Geronimo MD  Primary Care Provider: Ene Dorantes MD  Principal Problem: S/P laparoscopic hysterectomy    Follow-up For: Procedure(s) (LRB):  XI ROBOTIC HYSTERECTOMY (N/A)  XI ROBOTIC SALPINGO-OOPHORECTOMY (Bilateral)  Post-Operative Day: 1 Day Post-Op  Subjective:      History of Present Illness:  Ms. Wilson presents for a scheduled RA-TLH/BSO secondary to Grade 2 endometrial cancer.  She initially had an EMB due to thickened endometrial stripe revealing hyperplasia.  She subsequently developed fairly heavy VB x 3 weeks and was taken to the OR for HSC D&C on 1/25/19.  Path from that surgery showed G2 endometrial cancer in the background of CAH.  Reports bleeding is better since procedure.  Never stopped bleeding with menopause. PSH significant for BTL.  FH negative for breast/colon/gyn cancer.  TVUS showed uterus was 14 x 8 cm.     CT (3/14/19)  Impression         1. History endometrial cancer.  No metastatic disease identified in the abdomen or pelvis.  2. Enlarged, lobular uterus likely indicative of fibroids, though not specific by CT.  Similar changes were reported on the prior pelvic ultrasound 01/03/2019.  3. No acute process identified in abdomen or pelvis.  Other findings as above.  4.  There are no measurable lesions per RECIST criteria.           Hospital Course:  03/19/2019 - Scheduled and uncomplicated RA-TLH/BSO    Interval History: Patient is doing well this AM. Has tolerated a diet and passing flatus. Denies N/V. She denies pain this AM. Has not ambulated or voided as gonzalez was in place on rounds.      Scheduled Meds:   famotidine  20 mg Oral BID    ibuprofen  600 mg Oral Q6H    mupirocin  1 g Nasal BID    senna-docusate 8.6-50 mg  1 tablet Oral BID     Continuous Infusions:   sodium  chloride 0.9% 125 mL/hr at 03/19/19 1751     PRN Meds:bisacodyl, diphenhydrAMINE, diphenhydrAMINE, HYDROcodone-acetaminophen, HYDROcodone-acetaminophen, HYDROmorphone, ondansetron, promethazine (PHENERGAN) IVPB    Review of patient's allergies indicates:  No Known Allergies    Objective:     Vital Signs (Most Recent):  Temp: 98.5 °F (36.9 °C) (03/20/19 0817)  Pulse: 86 (03/20/19 0817)  Resp: 17 (03/20/19 0817)  BP: 135/65 (03/20/19 0817)  SpO2: (!) 92 % (03/20/19 0817) Vital Signs (24h Range):  Temp:  [97.3 °F (36.3 °C)-99 °F (37.2 °C)] 98.5 °F (36.9 °C)  Pulse:  [76-99] 86  Resp:  [16-21] 17  SpO2:  [92 %-100 %] 92 %  BP: (114-151)/(58-88) 135/65     Weight: (!) 158 kg (348 lb 5.2 oz)  Body mass index is 63.71 kg/m².    Intake/Output - Last 3 Shifts       03/18 0700 - 03/19 0659 03/19 0700 - 03/20 0659 03/20 0700 - 03/21 0659    P.O.  625 250    I.V. (mL/kg)  2466.7 (15.6) 389.6 (2.5)    Total Intake(mL/kg)  3091.7 (19.6) 639.6 (4)    Urine (mL/kg/hr)  1590 (0.4)     Blood  50     Total Output  1640     Net  +1451.7 +639.6           Urine Occurrence   1 x             Physical Exam:   Constitutional: She is oriented to person, place, and time. She appears well-developed and well-nourished. No distress.    HENT:   Head: Normocephalic and atraumatic.    Eyes: Conjunctivae are normal.    Neck: Neck supple.    Cardiovascular: Normal rate, regular rhythm and intact distal pulses.     Pulmonary/Chest: Effort normal. No respiratory distress.        Abdominal: Soft. Bowel sounds are normal. She exhibits abdominal incision (4 trocar sites and mini-lap site C/D/I with steristrips in place.  No signs of infection of inflammation. ). She exhibits no distension. There is no tenderness. There is no rebound and no guarding.                 Neurological: She is alert and oriented to person, place, and time.    Skin: Skin is warm and dry. She is not diaphoretic.    Psychiatric: She has a normal mood and affect. Her behavior is  normal. Judgment and thought content normal.       Lines/Drains/Airways     Peripheral Intravenous Line                 Peripheral IV - Single Lumen 03/19/19 0652 Right Hand 1 day              Assessment/Plan:     * S/P RATLH/BSO    - Standard post-op advances  - Tolerating Regular diet   - Remove gonzalez in the AM > Void trial > Encourage Ambulation  - Ibuprofen and Norco for Pain Relief  - I.S.   - TEDs and SCD's   - EBL < 50 cc. No AM CBC warranted  - Will D/C IVF @ 125  - Patient to D/C home once has voided successfully and ambulated in the room.      Endometrial cancer    - [place]/[facility] on [place].  Path from that surgery showed G2 endometrial cancer in the background of CAH  - Now s/p RA-MADHAV/BSO     JOSUÉ on CPAP    - CPAP ordered while inpatient     Essential hypertension    - Will hold HCTZ during the post-op period  - Hydralazine PRN for systolics > 180  - BP: (114-151)/(58-88) 135/65         VTE Risk Mitigation (From admission, onward)        Ordered     IP VTE HIGH RISK PATIENT  Once      03/19/19 1000     Place MARCIO hose  Until discontinued      03/19/19 1000     Place sequential compression device  Until discontinued      03/19/19 1000          Was gonzalez catheter removed? Yes    Ashleigh Thomas MD  Gynecologic Oncology  [facility]

## 2019-03-20 NOTE — PLAN OF CARE
Problem: Adult Inpatient Plan of Care  Goal: Plan of Care Review  Outcome: Ongoing (interventions implemented as appropriate)  Patient remains free from falls and injury this shift. Bed in low, locked position with call bell in reach. Family at bedside. Bed alarm active. Patient encouraged to call for assistance when getting out of bed. Patient verbalized understanding. All belongings within reach. VS stable. Afebrile. No complaints of pain- scheduled Ibuprofen given. 5 lap sites with scant dried drainage. Espinosa catheter to be removed this am. Will continue to monitor.

## 2019-03-20 NOTE — DISCHARGE SUMMARY
[facility]  Gynecologic Oncology  Discharge Summary    Patient Name: Hilary Wilson  MRN: [id number]  Admission Date: 3/19/2019  Hospital Length of Stay: 0 days  Discharge Date and Time:  03/20/2019 9:41 AM  Attending Physician: Stiven Geronimo MD   Discharging Provider: Ashleigh Thomas MD  Primary Care Provider: Ene Dorantes MD    HPI:   Ms. Wilson presents for a scheduled RA-TLH/BSO secondary to Grade 2 endometrial cancer.  She initially had an EMB due to thickened endometrial stripe revealing hyperplasia.  She subsequently developed fairly heavy VB x 3 weeks and was taken to the OR for HSC D&C on 1/25/19.  Path from that surgery showed G2 endometrial cancer in the background of CAH.  Reports bleeding is better since procedure.  Never stopped bleeding with menopause. PSH significant for BTL.  FH negative for breast/colon/gyn cancer.  TVUS showed uterus was 14 x 8 cm.     CT (3/14/19)  Impression         1. History endometrial cancer.  No metastatic disease identified in the abdomen or pelvis.  2. Enlarged, lobular uterus likely indicative of fibroids, though not specific by CT.  Similar changes were reported on the prior pelvic ultrasound 01/03/2019.  3. No acute process identified in abdomen or pelvis.  Other findings as above.  4.  There are no measurable lesions per RECIST criteria.           Hospital Course:  03/19/2019 - Scheduled and uncomplicated RA-TLH/BSO  03/20/2019 - POD # 1 s/p RA-MADHAV/BSO. Meeting post-operative goals. Stable for discharge in good condition after voiding and ambulation. All milestones met. Stable for discharge.    Procedure(s) (LRB):  XI ROBOTIC HYSTERECTOMY (N/A)  XI ROBOTIC SALPINGO-OOPHORECTOMY (Bilateral)         Significant Diagnostic Studies: Labs: All labs within the past 24 hours have been reviewed    Pending Diagnostic Studies:     None        Final Active Diagnoses:    Diagnosis Date Noted POA    PRINCIPAL PROBLEM:  S/P RATLH/BSO [Z90.710]  "03/19/2019 No    Endometrial cancer [C54.1] 02/15/2019 Yes    Essential hypertension [I10] 12/09/2015 Yes    JOSUÉ on CPAP [G47.33, Z99.89]  Not Applicable      Problems Resolved During this Admission:        Does this patient meet criteria for extended DVT prophylaxis? No, because as their is no indcation for post-surgical prophylaxis in this patient.     Discharged Condition: good    Disposition: Home or Self Care    Follow Up:  Follow-up Information     Stiven Geronimo MD In 4 weeks.    Specialties:  Gynecologic Oncology, Gynecology, Oncology  Why:  For Post-Op Visit  Contact information:  [address]  [facility] [address]  [phone]                 Patient Instructions:      Lifting restrictions   Order Comments: Nothing heavier than a gallon of milk for 2-3 weeks.     Other restrictions (specify):   Order Comments: Pelvic Rest - Nothing in the Vagina for 6 weeks.     No driving until:   Order Comments: No longer requiring narcotic medication and able to "stomp on the brake" without abdominal pain.     Notify your health care provider if you experience any of the following:  increased confusion or weakness     Notify your health care provider if you experience any of the following:  persistent dizziness, light-headedness, or visual disturbances     Notify your health care provider if you experience any of the following:  worsening rash     Notify your health care provider if you experience any of the following:  severe persistent headache     Notify your health care provider if you experience any of the following:  difficulty breathing or increased cough     Notify your health care provider if you experience any of the following:  redness, tenderness, or signs of infection (pain, swelling, redness, odor or green/yellow discharge around incision site)     Notify your health care provider if you experience any of the following:  severe uncontrolled pain     Notify your health care provider if you " experience any of the following:  persistent nausea and vomiting or diarrhea     Notify your health care provider if you experience any of the following:  temperature >100.4     Notify your health care provider if you experience any of the following:   Order Comments: Heavy vaginal bleeding greater than vaginal spotting.     Activity as tolerated     Medications:  Reconciled Home Medications:      Medication List      CHANGE how you take these medications    * HYDROcodone-acetaminophen 5-325 mg per tablet  Commonly known as:  NORCO  Take 1 tablet by mouth every 4 (four) hours as needed for Pain.  What changed:  Another medication with the same name was added. Make sure you understand how and when to take each.     * HYDROcodone-acetaminophen 5-325 mg per tablet  Commonly known as:  NORCO  Take 1 tablet by mouth every 4 (four) hours as needed.  What changed:  You were already taking a medication with the same name, and this prescription was added. Make sure you understand how and when to take each.     * ibuprofen 800 MG tablet  Commonly known as:  ADVIL,MOTRIN  Take 1 tablet (800 mg total) by mouth every 8 (eight) hours as needed for Pain.  What changed:  Another medication with the same name was added. Make sure you understand how and when to take each.     * ibuprofen 600 MG tablet  Commonly known as:  ADVIL,MOTRIN  Take 1 tablet (600 mg total) by mouth every 6 (six) hours.  What changed:  You were already taking a medication with the same name, and this prescription was added. Make sure you understand how and when to take each.         * This list has 4 medication(s) that are the same as other medications prescribed for you. Read the directions carefully, and ask your doctor or other care provider to review them with you.            CONTINUE taking these medications    albuterol 90 mcg/actuation inhaler  Commonly known as:  PROVENTIL/VENTOLIN HFA  INHALE 2 PUFFS ORALLY EVERY SIX HOURS AS NEEDED FOR WHEEZING      hydroCHLOROthiazide 25 MG tablet  Commonly known as:  HYDRODIURIL  TAKE 1 TABLET BY MOUTH ONE TIME DAILY     mupirocin 2 % ointment  Commonly known as:  BACTROBAN  Apply topically 3 (three) times daily.     potassium chloride 8 mEq Cpsr  Commonly known as:  MICRO-K  Take 1 capsule (8 mEq total) by mouth once daily.            Ashleigh Thomas MD  Gynecologic Oncology  [facility]

## 2019-04-09 ENCOUNTER — TELEPHONE (OUTPATIENT)
Dept: GYNECOLOGIC ONCOLOGY | Facility: CLINIC | Age: 64
End: 2019-04-09

## 2019-04-11 ENCOUNTER — TELEPHONE (OUTPATIENT)
Dept: GYNECOLOGIC ONCOLOGY | Facility: CLINIC | Age: 64
End: 2019-04-11

## 2019-04-15 ENCOUNTER — OFFICE VISIT (OUTPATIENT)
Dept: GYNECOLOGIC ONCOLOGY | Facility: CLINIC | Age: 64
End: 2019-04-15
Payer: COMMERCIAL

## 2019-04-15 VITALS
HEART RATE: 90 BPM | DIASTOLIC BLOOD PRESSURE: 70 MMHG | WEIGHT: 293 LBS | SYSTOLIC BLOOD PRESSURE: 159 MMHG | BODY MASS INDEX: 63.31 KG/M2

## 2019-04-15 DIAGNOSIS — C54.1 ENDOMETRIAL CANCER: Primary | ICD-10-CM

## 2019-04-15 PROCEDURE — 99999 PR PBB SHADOW E&M-EST. PATIENT-LVL III: CPT | Mod: PBBFAC,,, | Performed by: OBSTETRICS & GYNECOLOGY

## 2019-04-15 PROCEDURE — 99024 POSTOP FOLLOW-UP VISIT: CPT | Mod: S$GLB,,, | Performed by: OBSTETRICS & GYNECOLOGY

## 2019-04-15 PROCEDURE — 99999 PR PBB SHADOW E&M-EST. PATIENT-LVL III: ICD-10-PCS | Mod: PBBFAC,,, | Performed by: OBSTETRICS & GYNECOLOGY

## 2019-04-15 PROCEDURE — 99024 PR POST-OP FOLLOW-UP VISIT: ICD-10-PCS | Mod: S$GLB,,, | Performed by: OBSTETRICS & GYNECOLOGY

## 2019-04-15 NOTE — PROGRESS NOTES
Subjective:      Patient ID: Hilary Wilson is a 64 y.o. female.    Chief Complaint: Post-op Evaluation    Treatment History  Clinical Stage IAG2 endometrial cancer (mainly in polyp, 3mm of 27 mm invasion)  RALH/BSO/Minilap for uterine extraction  Morbid obesity precluding staging    HPI  Here today for post-op check and path discussion.  Has healed well from surgery.  Denies VB, F/C, N/V.  Sates incisions have healed ok.  Review of Systems   Constitutional: Negative for activity change, appetite change, chills, fatigue and fever.   HENT: Negative for hearing loss, mouth sores, nosebleeds, sore throat and tinnitus.    Eyes: Negative for visual disturbance.   Respiratory: Negative for cough, chest tightness, shortness of breath and wheezing.    Cardiovascular: Negative for chest pain and leg swelling.   Gastrointestinal: Negative for abdominal distention, abdominal pain, blood in stool, constipation, diarrhea, nausea and vomiting.   Genitourinary: Negative for dysuria, flank pain, frequency, hematuria, pelvic pain, vaginal bleeding, vaginal discharge and vaginal pain.   Musculoskeletal: Negative for arthralgias and back pain.   Skin: Negative for rash.   Neurological: Negative for dizziness, seizures, syncope, weakness and numbness.   Hematological: Does not bruise/bleed easily.   Psychiatric/Behavioral: Negative for confusion and sleep disturbance. The patient is not nervous/anxious.        Objective:   Physical Exam:   Constitutional: She appears well-developed and well-nourished. No distress.    HENT:   Head: Normocephalic and atraumatic.    Eyes: No scleral icterus.    Neck: Normal range of motion. Neck supple.    Cardiovascular: Normal rate and intact distal pulses.  Exam reveals no cyanosis and no edema.     Pulmonary/Chest: Effort normal. No respiratory distress. She exhibits no tenderness.        Abdominal: Soft. She exhibits no distension (incisions healing well), no fluid wave, no ascites and no mass.  There is no tenderness. There is no rebound and no guarding. No hernia.         Genitourinary: Rectum normal and vagina normal. Pelvic exam was performed with patient supine. There is no rash, tenderness or lesion on the right labia. There is no rash, tenderness or lesion on the left labia. Uterus is absent. There is an absent adnexa. Right adnexum displays no mass, no tenderness and no fullness. Left adnexum displays no mass, no tenderness and no fullness. No bleeding (cuff healing well) or unspecified prolapse of vaginal walls in the vagina. No vaginal discharge found. Vaginal cuff normal.Labial bartholins normal.Cervix exhibits absence.              Lymphadenopathy:     She has no cervical adenopathy.        Right: No inguinal adenopathy present.        Left: No inguinal adenopathy present.     Skin: No cyanosis.        Assessment:     1. Endometrial cancer    2. BMI 60.0-69.9, adult        Plan:       Overall doing well after surgery with no major issues.  Path discussed with her.  Had preop CT that was negative.  Given this, will require no adjuvant treatment.  She voiced understanding.  Will move to q 3 month obs.

## 2019-10-09 ENCOUNTER — OFFICE VISIT (OUTPATIENT)
Dept: FAMILY MEDICINE | Facility: CLINIC | Age: 64
End: 2019-10-09
Payer: COMMERCIAL

## 2019-10-09 VITALS
TEMPERATURE: 99 F | HEIGHT: 62 IN | HEART RATE: 92 BPM | OXYGEN SATURATION: 97 % | SYSTOLIC BLOOD PRESSURE: 120 MMHG | WEIGHT: 293 LBS | DIASTOLIC BLOOD PRESSURE: 80 MMHG | BODY MASS INDEX: 53.92 KG/M2

## 2019-10-09 DIAGNOSIS — I10 ESSENTIAL HYPERTENSION: ICD-10-CM

## 2019-10-09 DIAGNOSIS — Z12.31 ENCOUNTER FOR SCREENING MAMMOGRAM FOR BREAST CANCER: ICD-10-CM

## 2019-10-09 DIAGNOSIS — Z00.00 ANNUAL PHYSICAL EXAM: Primary | ICD-10-CM

## 2019-10-09 DIAGNOSIS — M17.12 LOCALIZED OSTEOARTHRITIS OF LEFT KNEE: ICD-10-CM

## 2019-10-09 DIAGNOSIS — R73.03 PREDIABETES: ICD-10-CM

## 2019-10-09 PROCEDURE — 99396 PR PREVENTIVE VISIT,EST,40-64: ICD-10-PCS | Mod: S$GLB,,, | Performed by: FAMILY MEDICINE

## 2019-10-09 PROCEDURE — 3079F DIAST BP 80-89 MM HG: CPT | Mod: CPTII,S$GLB,, | Performed by: FAMILY MEDICINE

## 2019-10-09 PROCEDURE — 99999 PR PBB SHADOW E&M-EST. PATIENT-LVL IV: ICD-10-PCS | Mod: PBBFAC,,, | Performed by: FAMILY MEDICINE

## 2019-10-09 PROCEDURE — 99396 PREV VISIT EST AGE 40-64: CPT | Mod: S$GLB,,, | Performed by: FAMILY MEDICINE

## 2019-10-09 PROCEDURE — 3074F SYST BP LT 130 MM HG: CPT | Mod: CPTII,S$GLB,, | Performed by: FAMILY MEDICINE

## 2019-10-09 PROCEDURE — 3074F PR MOST RECENT SYSTOLIC BLOOD PRESSURE < 130 MM HG: ICD-10-PCS | Mod: CPTII,S$GLB,, | Performed by: FAMILY MEDICINE

## 2019-10-09 PROCEDURE — 99999 PR PBB SHADOW E&M-EST. PATIENT-LVL IV: CPT | Mod: PBBFAC,,, | Performed by: FAMILY MEDICINE

## 2019-10-09 PROCEDURE — 3079F PR MOST RECENT DIASTOLIC BLOOD PRESSURE 80-89 MM HG: ICD-10-PCS | Mod: CPTII,S$GLB,, | Performed by: FAMILY MEDICINE

## 2019-10-09 RX ORDER — DICLOFENAC SODIUM 10 MG/G
2 GEL TOPICAL 3 TIMES DAILY
Qty: 100 G | Refills: 0 | Status: SHIPPED | OUTPATIENT
Start: 2019-10-09 | End: 2021-05-03 | Stop reason: SDUPTHER

## 2019-10-09 RX ORDER — PHENTERMINE HYDROCHLORIDE 15 MG/1
15 CAPSULE ORAL EVERY MORNING
Qty: 30 CAPSULE | Refills: 2 | Status: SHIPPED | OUTPATIENT
Start: 2019-10-09 | End: 2019-11-08

## 2019-10-09 NOTE — PROGRESS NOTES
Assessment & Plan  Problem List Items Addressed This Visit        Cardiac/Vascular    Essential hypertension    Relevant Orders    Comprehensive metabolic panel (Completed)    CBC auto differential (Completed)    Hemoglobin A1c (Completed)    Lipid panel (Completed)    TSH (Completed)       Endocrine    Prediabetes    Relevant Orders    Comprehensive metabolic panel (Completed)    CBC auto differential (Completed)    Hemoglobin A1c (Completed)    Lipid panel (Completed)    TSH (Completed)    BMI 60.0-69.9, adult    Relevant Medications    phentermine 15 MG capsule      Other Visit Diagnoses     Annual physical exam    -  Primary    Relevant Orders    Comprehensive metabolic panel (Completed)    CBC auto differential (Completed)    Hemoglobin A1c (Completed)    Lipid panel (Completed)    TSH (Completed)    Encounter for screening mammogram for breast cancer        Relevant Orders    Mammo Digital Screening Bilat w/ [place] (Completed)    Localized osteoarthritis of left knee        Relevant Medications    diclofenac sodium (VOLTAREN) 1 % Gel      I addressed all major concerns as it related to health maintenance.  All were ordered and scheduled based on the patients wishes.  Any additional health maintenance will be readdressed at the next physical if declined or deferred by the patient.        Health Maintenance reviewed    Follow-up: Follow up in about 1 year (around 10/9/2020) for annual exam.    ______________________________________________________________________    Chief Complaint  Chief Complaint   Patient presents with    Annual Exam       HPI  Hilary Wilson is a 64 y.o. female with multiple medical diagnoses as listed in the medical history and problem list that presents for annual exam.  Pt is known to me with last appointment 3/12/2019.    Patient denies any new symptoms including chest pain, SOB, blurry vision, N/V, diarrhea.        PAST MEDICAL HISTORY:  Past Medical History:   Diagnosis Date     Asthma     Hyperlipidemia     Hypertension     JOSUÉ on CPAP     Uterine cancer     Vaginal delivery     x3       PAST SURGICAL HISTORY:  Past Surgical History:   Procedure Laterality Date    BREAST BIOPSY Left     COLONOSCOPY N/A 11/3/2016    Procedure: COLONOSCOPY;  Surgeon: Zhang Diez MD;  Location: [facility] ENDO;  Service: Endoscopy;  Laterality: N/A;    HYSTERECTOMY  03/2019    HYSTEROSCOPY WITH DILATION AND CURETTAGE OF UTERUS N/A 1/25/2019    Procedure: HYSTEROSCOPY, WITH DILATION AND CURETTAGE OF UTERUS;  Surgeon: Elías Nolasco MD;  Location: [facility] OR;  Service: OB/GYN;  Laterality: N/A;  RN PRE OP 1-22-19---BMI--61.3  TUCLEAR MYOSURE TAHIRA PRECHTER [phone] SPOKE TO HER ON 1-23-19 @ 12:23PM    mass removal      OOPHORECTOMY  03/2019    OK REMOVAL OF OVARY/TUBE(S)      ROBOT-ASSISTED LAPAROSCOPIC ABDOMINAL HYSTERECTOMY USING DA MALCOLM XI N/A 3/19/2019    Procedure: XI ROBOTIC HYSTERECTOMY;  Surgeon: Stiven Geronimo MD;  Location: [facility] OR [facility] FLR;  Service: OB/GYN;  Laterality: N/A;  POSS DIFF INTUBATION-GLIDESCOPE NEEDED  Wt 341 lbs  T & S am of surgery    ROBOT-ASSISTED LAPAROSCOPIC SALPINGO-OOPHORECTOMY USING DA MALCOLM XI Bilateral 3/19/2019    Procedure: XI ROBOTIC SALPINGO-OOPHORECTOMY;  Surgeon: Stiven Geronimo MD;  Location: [facility] OR 2ND FLR;  Service: OB/GYN;  Laterality: Bilateral;    TUBAL LIGATION         SOCIAL HISTORY:  Social History     Socioeconomic History    Marital status:      Spouse name: Not on file    Number of children: Not on file    Years of education: Not on file    Highest education level: Not on file   Occupational History    Not on file   Social Needs    Financial resource strain: Not on file    Food insecurity:     Worry: Not on file     Inability: Not on file    Transportation needs:     Medical: Not on file     Non-medical: Not on file   Tobacco Use    Smoking status: Never Smoker    Smokeless tobacco: Never Used   Substance and  Sexual Activity    Alcohol use: No    Drug use: No    Sexual activity: Yes     Partners: Male   Lifestyle    Physical activity:     Days per week: Not on file     Minutes per session: Not on file    Stress: Not on file   Relationships    Social connections:     Talks on phone: Not on file     Gets together: Not on file     Attends Jain service: Not on file     Active member of club or organization: Not on file     Attends meetings of clubs or organizations: Not on file     Relationship status: Not on file   Other Topics Concern    Not on file   Social History Narrative    Not on file       FAMILY HISTORY:  Family History   Problem Relation Age of Onset    Hypertension Mother     Kidney disease Mother     Diabetes Father     Stroke Father     Heart disease Sister 49    Kidney disease Sister     Stroke Sister         in 50s       ALLERGIES AND MEDICATIONS: updated and reviewed.  Review of patient's allergies indicates:  No Known Allergies  Current Outpatient Medications   Medication Sig Dispense Refill    albuterol (PROVENTIL/VENTOLIN HFA) 90 mcg/actuation inhaler INHALE 2 PUFFS ORALLY EVERY SIX HOURS AS NEEDED FOR WHEEZING 24 Inhaler 3    hydroCHLOROthiazide (HYDRODIURIL) 25 MG tablet TAKE 1 TABLET BY MOUTH ONE TIME DAILY 90 tablet 3    ibuprofen (ADVIL,MOTRIN) 800 MG tablet Take 1 tablet (800 mg total) by mouth every 8 (eight) hours as needed for Pain. 40 tablet 0    diclofenac sodium (VOLTAREN) 1 % Gel Apply 2 g topically 3 (three) times daily. 100 g 0    mupirocin (BACTROBAN) 2 % ointment Apply topically 3 (three) times daily. for 10 days 30 g 2    phentermine 15 MG capsule Take 1 capsule (15 mg total) by mouth every morning. 30 capsule 2     No current facility-administered medications for this visit.          ROS  Review of Systems   Constitutional: Negative for activity change, appetite change, fatigue, fever and unexpected weight change.   HENT: Negative.  Negative for ear discharge,  "ear pain, rhinorrhea and sore throat.    Eyes: Negative.    Respiratory: Negative for apnea, cough, chest tightness, shortness of breath and wheezing.    Cardiovascular: Negative for chest pain, palpitations and leg swelling.   Gastrointestinal: Negative for abdominal distention, abdominal pain, constipation, diarrhea and vomiting.   Endocrine: Negative for cold intolerance, heat intolerance, polydipsia and polyuria.   Genitourinary: Negative for decreased urine volume and urgency.   Musculoskeletal: Negative.    Skin: Negative for rash.   Neurological: Negative for dizziness and headaches.   Hematological: Does not bruise/bleed easily.   Psychiatric/Behavioral: Negative for agitation, sleep disturbance and suicidal ideas.           Physical Exam  Vitals:    10/09/19 1501   BP: 120/80   BP Location: Left arm   Patient Position: Sitting   BP Method: Large (Manual)   Pulse: 92   Temp: 99.3 °F (37.4 °C)   TempSrc: Oral   SpO2: 97%   Weight: (!) 160.5 kg (353 lb 13.4 oz)   Height: 5' 2" (1.575 m)    Body mass index is 64.72 kg/m².  Weight: (!) 160.5 kg (353 lb 13.4 oz)   Height: 5' 2" (157.5 cm)   Physical Exam   Constitutional: She is oriented to person, place, and time. She appears well-developed and well-nourished.   HENT:   Head: Normocephalic and atraumatic.   Right Ear: External ear normal.   Left Ear: External ear normal.   Nose: Nose normal.   Mouth/Throat: Oropharynx is clear and moist.   Eyes: Pupils are equal, round, and reactive to light. Conjunctivae and EOM are normal.   Cardiovascular: Normal rate, regular rhythm and normal heart sounds.   Pulmonary/Chest: Effort normal and breath sounds normal.   Neurological: She is alert and oriented to person, place, and time.   Skin: Skin is warm and dry.   Vitals reviewed.      Health Maintenance       Date Due Completion Date    TETANUS VACCINE 03/29/1973 ---    Pneumococcal Vaccine (Highest Risk) (1 of 3 - PCV13) 03/29/1974 ---    Shingles Vaccine (1 of 2) " 03/29/2005 ---    Mammogram 01/04/2019 1/4/2018    Influenza Vaccine (1) 09/01/2019 ---    Lipid Panel 03/14/2024 3/14/2019    Colonoscopy 11/03/2026 11/3/2016    Override on 5/7/2010: Done (Dr. Roberts, [facility])              Patient note was created using [employer].  Any errors in syntax or even information may not have been identified and edited on initial review prior to signing this note.

## 2019-10-10 ENCOUNTER — TELEPHONE (OUTPATIENT)
Dept: RADIOLOGY | Facility: HOSPITAL | Age: 64
End: 2019-10-10

## 2019-10-14 ENCOUNTER — TELEPHONE (OUTPATIENT)
Dept: RADIOLOGY | Facility: HOSPITAL | Age: 64
End: 2019-10-14

## 2019-10-15 ENCOUNTER — HOSPITAL ENCOUNTER (OUTPATIENT)
Dept: RADIOLOGY | Facility: HOSPITAL | Age: 64
Discharge: HOME | End: 2019-10-15
Attending: FAMILY MEDICINE
Payer: COMMERCIAL

## 2019-10-15 DIAGNOSIS — Z12.31 ENCOUNTER FOR SCREENING MAMMOGRAM FOR BREAST CANCER: ICD-10-CM

## 2019-10-15 PROCEDURE — 77067 MAMMO DIGITAL SCREENING BILAT WITH TOMOSYNTHESIS_CAD: ICD-10-PCS | Mod: 26,,, | Performed by: RADIOLOGY

## 2019-10-15 PROCEDURE — 77063 BREAST TOMOSYNTHESIS BI: CPT | Mod: TC,PO

## 2019-10-15 PROCEDURE — 77063 BREAST TOMOSYNTHESIS BI: CPT | Mod: 26,,, | Performed by: RADIOLOGY

## 2019-10-15 PROCEDURE — 77067 SCR MAMMO BI INCL CAD: CPT | Mod: 26,,, | Performed by: RADIOLOGY

## 2019-10-15 PROCEDURE — 77063 MAMMO DIGITAL SCREENING BILAT WITH TOMOSYNTHESIS_CAD: ICD-10-PCS | Mod: 26,,, | Performed by: RADIOLOGY

## 2019-10-17 DIAGNOSIS — L02.32 BOIL OF BUTTOCK: ICD-10-CM

## 2019-10-17 RX ORDER — MUPIROCIN 20 MG/G
OINTMENT TOPICAL 3 TIMES DAILY
Qty: 30 G | Refills: 2 | Status: SHIPPED | OUTPATIENT
Start: 2019-10-17 | End: 2019-10-27

## 2019-10-18 ENCOUNTER — TELEPHONE (OUTPATIENT)
Dept: FAMILY MEDICINE | Facility: CLINIC | Age: 64
End: 2019-10-18

## 2019-10-18 NOTE — TELEPHONE ENCOUNTER
----- Message from Ene Dorantes MD sent at 10/18/2019  8:44 AM CDT -----  Please contact patient.  She will need to get back in to discuss her blood work.  She is now a diabetic.  We need to get her set up for diabetic education.

## 2019-10-25 ENCOUNTER — TELEPHONE (OUTPATIENT)
Dept: GYNECOLOGIC ONCOLOGY | Facility: CLINIC | Age: 64
End: 2019-10-25

## 2019-10-28 ENCOUNTER — OFFICE VISIT (OUTPATIENT)
Dept: GYNECOLOGIC ONCOLOGY | Facility: CLINIC | Age: 64
End: 2019-10-28
Payer: COMMERCIAL

## 2019-10-28 VITALS
HEART RATE: 97 BPM | BODY MASS INDEX: 64.56 KG/M2 | WEIGHT: 293 LBS | DIASTOLIC BLOOD PRESSURE: 62 MMHG | SYSTOLIC BLOOD PRESSURE: 130 MMHG

## 2019-10-28 DIAGNOSIS — C54.1 ENDOMETRIAL CANCER: Primary | ICD-10-CM

## 2019-10-28 DIAGNOSIS — I10 ESSENTIAL HYPERTENSION: ICD-10-CM

## 2019-10-28 DIAGNOSIS — R73.03 PREDIABETES: ICD-10-CM

## 2019-10-28 PROCEDURE — 99999 PR PBB SHADOW E&M-EST. PATIENT-LVL III: ICD-10-PCS | Mod: PBBFAC,,, | Performed by: OBSTETRICS & GYNECOLOGY

## 2019-10-28 PROCEDURE — 3008F BODY MASS INDEX DOCD: CPT | Mod: CPTII,S$GLB,, | Performed by: OBSTETRICS & GYNECOLOGY

## 2019-10-28 PROCEDURE — 99214 PR OFFICE/OUTPT VISIT, EST, LEVL IV, 30-39 MIN: ICD-10-PCS | Mod: S$GLB,,, | Performed by: OBSTETRICS & GYNECOLOGY

## 2019-10-28 PROCEDURE — 3075F SYST BP GE 130 - 139MM HG: CPT | Mod: CPTII,S$GLB,, | Performed by: OBSTETRICS & GYNECOLOGY

## 2019-10-28 PROCEDURE — 3008F PR BODY MASS INDEX (BMI) DOCUMENTED: ICD-10-PCS | Mod: CPTII,S$GLB,, | Performed by: OBSTETRICS & GYNECOLOGY

## 2019-10-28 PROCEDURE — 3075F PR MOST RECENT SYSTOLIC BLOOD PRESS GE 130-139MM HG: ICD-10-PCS | Mod: CPTII,S$GLB,, | Performed by: OBSTETRICS & GYNECOLOGY

## 2019-10-28 PROCEDURE — 99214 OFFICE O/P EST MOD 30 MIN: CPT | Mod: S$GLB,,, | Performed by: OBSTETRICS & GYNECOLOGY

## 2019-10-28 PROCEDURE — 99999 PR PBB SHADOW E&M-EST. PATIENT-LVL III: CPT | Mod: PBBFAC,,, | Performed by: OBSTETRICS & GYNECOLOGY

## 2019-10-28 PROCEDURE — 3078F DIAST BP <80 MM HG: CPT | Mod: CPTII,S$GLB,, | Performed by: OBSTETRICS & GYNECOLOGY

## 2019-10-28 PROCEDURE — 3078F PR MOST RECENT DIASTOLIC BLOOD PRESSURE < 80 MM HG: ICD-10-PCS | Mod: CPTII,S$GLB,, | Performed by: OBSTETRICS & GYNECOLOGY

## 2019-10-28 NOTE — PROGRESS NOTES
Subjective:      Patient ID: Hilary Wilson is a 64 y.o. female.    Chief Complaint: Endometrial Cancer (f/u)    Treatment History  Clinical Stage IAG2 endometrial cancer (mainly in polyp, 3mm of 27 mm invasion)  RALH/BSO/Minilap for uterine extraction  Morbid obesity precluding staging    HPI  Here today for continued surveillance.  Has healed well from surgery.  Denies VB, F/C, N/V.  No new symptoms per patient.  Review of Systems   Constitutional: Negative for activity change, appetite change, chills, fatigue and fever.   HENT: Negative for hearing loss, mouth sores, nosebleeds, sore throat and tinnitus.    Eyes: Negative for visual disturbance.   Respiratory: Negative for cough, chest tightness, shortness of breath and wheezing.    Cardiovascular: Negative for chest pain and leg swelling.   Gastrointestinal: Negative for abdominal distention, abdominal pain, blood in stool, constipation, diarrhea, nausea and vomiting.   Genitourinary: Negative for dysuria, flank pain, frequency, hematuria, pelvic pain, vaginal bleeding, vaginal discharge and vaginal pain.   Musculoskeletal: Negative for arthralgias and back pain.   Skin: Negative for rash.   Neurological: Negative for dizziness, seizures, syncope, weakness and numbness.   Hematological: Does not bruise/bleed easily.   Psychiatric/Behavioral: Negative for confusion and sleep disturbance. The patient is not nervous/anxious.        Objective:   Physical Exam:   Constitutional: She appears well-developed and well-nourished. No distress.    HENT:   Head: Normocephalic and atraumatic.    Eyes: No scleral icterus.    Neck: Normal range of motion. Neck supple.    Cardiovascular: Normal rate and intact distal pulses.  Exam reveals no cyanosis and no edema.     Pulmonary/Chest: Effort normal. No respiratory distress. She exhibits no tenderness.        Abdominal: Soft. She exhibits no distension (incisions well healed), no fluid wave, no ascites and no mass. There is  no tenderness. There is no rebound and no guarding. No hernia.         Genitourinary: Rectum normal and vagina normal. Pelvic exam was performed with patient supine. There is no rash, tenderness or lesion on the right labia. There is no rash, tenderness or lesion on the left labia. Uterus is absent. There is an absent adnexa. Right adnexum displays no mass, no tenderness and no fullness. Left adnexum displays no mass, no tenderness and no fullness. No bleeding (cuff without lesion) or unspecified prolapse of vaginal walls in the vagina. No vaginal discharge found. Vaginal cuff normal.Labial bartholins normal.Cervix exhibits absence.              Lymphadenopathy:     She has no cervical adenopathy.     Skin: No cyanosis.        Assessment:     1. Endometrial cancer    2. BMI 60.0-69.9, adult    3. Prediabetes    4. Essential hypertension        Plan:       BRITTNEY on exam today.  Will continue surveillance at 3 month intervals.  RTC 3 months

## 2019-11-12 ENCOUNTER — OFFICE VISIT (OUTPATIENT)
Dept: FAMILY MEDICINE | Facility: CLINIC | Age: 64
End: 2019-11-12
Payer: COMMERCIAL

## 2019-11-12 VITALS
BODY MASS INDEX: 53.92 KG/M2 | SYSTOLIC BLOOD PRESSURE: 120 MMHG | OXYGEN SATURATION: 97 % | HEART RATE: 97 BPM | WEIGHT: 293 LBS | DIASTOLIC BLOOD PRESSURE: 80 MMHG | HEIGHT: 62 IN | TEMPERATURE: 98 F

## 2019-11-12 DIAGNOSIS — R73.03 PREDIABETES: ICD-10-CM

## 2019-11-12 DIAGNOSIS — E66.01 MORBID OBESITY WITH BMI OF 60.0-69.9, ADULT: ICD-10-CM

## 2019-11-12 DIAGNOSIS — I10 ESSENTIAL HYPERTENSION: Primary | ICD-10-CM

## 2019-11-12 DIAGNOSIS — L02.92 RECURRENT BOILS: ICD-10-CM

## 2019-11-12 PROCEDURE — 99214 OFFICE O/P EST MOD 30 MIN: CPT | Mod: S$GLB,,, | Performed by: FAMILY MEDICINE

## 2019-11-12 PROCEDURE — 3008F BODY MASS INDEX DOCD: CPT | Mod: CPTII,S$GLB,, | Performed by: FAMILY MEDICINE

## 2019-11-12 PROCEDURE — 99214 PR OFFICE/OUTPT VISIT, EST, LEVL IV, 30-39 MIN: ICD-10-PCS | Mod: S$GLB,,, | Performed by: FAMILY MEDICINE

## 2019-11-12 PROCEDURE — 3079F DIAST BP 80-89 MM HG: CPT | Mod: CPTII,S$GLB,, | Performed by: FAMILY MEDICINE

## 2019-11-12 PROCEDURE — 99999 PR PBB SHADOW E&M-EST. PATIENT-LVL III: ICD-10-PCS | Mod: PBBFAC,,, | Performed by: FAMILY MEDICINE

## 2019-11-12 PROCEDURE — 3079F PR MOST RECENT DIASTOLIC BLOOD PRESSURE 80-89 MM HG: ICD-10-PCS | Mod: CPTII,S$GLB,, | Performed by: FAMILY MEDICINE

## 2019-11-12 PROCEDURE — 3008F PR BODY MASS INDEX (BMI) DOCUMENTED: ICD-10-PCS | Mod: CPTII,S$GLB,, | Performed by: FAMILY MEDICINE

## 2019-11-12 PROCEDURE — 3074F SYST BP LT 130 MM HG: CPT | Mod: CPTII,S$GLB,, | Performed by: FAMILY MEDICINE

## 2019-11-12 PROCEDURE — 3074F PR MOST RECENT SYSTOLIC BLOOD PRESSURE < 130 MM HG: ICD-10-PCS | Mod: CPTII,S$GLB,, | Performed by: FAMILY MEDICINE

## 2019-11-12 PROCEDURE — 99999 PR PBB SHADOW E&M-EST. PATIENT-LVL III: CPT | Mod: PBBFAC,,, | Performed by: FAMILY MEDICINE

## 2019-11-12 RX ORDER — MUPIROCIN 20 MG/G
OINTMENT TOPICAL 3 TIMES DAILY
Qty: 30 G | Refills: 3 | Status: SHIPPED | OUTPATIENT
Start: 2019-11-12 | End: 2019-11-22

## 2019-11-12 RX ORDER — PHENTERMINE HYDROCHLORIDE 15 MG/1
15 CAPSULE ORAL EVERY MORNING
Qty: 30 CAPSULE | Refills: 2 | Status: SHIPPED | OUTPATIENT
Start: 2019-11-12 | End: 2019-12-12

## 2019-11-12 NOTE — ASSESSMENT & PLAN NOTE
Does not require medication at this time.  Will focus on diet modifications and weight loss.  Strongly encouraged the patient to focus on eating meals consistently.

## 2019-11-12 NOTE — PROGRESS NOTES
Assessment & Plan  Problem List Items Addressed This Visit        Cardiac/Vascular    Essential hypertension - Primary    Current Assessment & Plan     Well controlled.  Pt is currently stable on medication regimen.  Continue current therapy as scheduled.  Contact office with any questions about adjustments on medications.               Endocrine    Prediabetes    Current Assessment & Plan     Does not require medication at this time.  Will focus on diet modifications and weight loss.  Strongly encouraged the patient to focus on eating meals consistently.           Other Visit Diagnoses     Recurrent boils        Relevant Medications    mupirocin (BACTROBAN) 2 % ointment    Morbid obesity with BMI of 60.0-69.9, adult        Relevant Medications    phentermine 15 MG capsule            Health Maintenance reviewed.    Follow-up: No follow-ups on file.    ______________________________________________________________________    Chief Complaint  Chief Complaint   Patient presents with    Diabetes    check boils       HPI  Hilary Wilson is a 64 y.o. female with multiple medical diagnoses as listed in the medical history and problem list that presents for diabetes.  Pt is known to me with last appointment 10/9/2019.    She is very familiar with diabetes.  She recently had hemoglobin A1c that was elevated at 6.6.  Patient is aware that she needs to make modifications to her diet.  Her major concern is skipped meals.  Patient does not eat consistently.  This is cause great fluctuation in her blood sugar levels.  Her father had diabetes.  Discussed medication intervention at this time.  Patient does not want medication intervention.  She would like to focus on her diet.        PAST MEDICAL HISTORY:  Past Medical History:   Diagnosis Date    Asthma     Diabetes mellitus     Hyperlipidemia     Hypertension     JOSUÉ on CPAP     Uterine cancer     Vaginal delivery     x3       PAST SURGICAL HISTORY:  Past  Surgical History:   Procedure Laterality Date    BREAST BIOPSY Left     COLONOSCOPY N/A 11/3/2016    Procedure: COLONOSCOPY;  Surgeon: Zhang Diez MD;  Location: [facility] ENDO;  Service: Endoscopy;  Laterality: N/A;    HYSTERECTOMY  03/2019    HYSTEROSCOPY WITH DILATION AND CURETTAGE OF UTERUS N/A 1/25/2019    Procedure: HYSTEROSCOPY, WITH DILATION AND CURETTAGE OF UTERUS;  Surgeon: Elías Nolasco MD;  Location: [facility] OR;  Service: OB/GYN;  Laterality: N/A;  RN PRE OP 1-22-19---BMI--61.3  TUCLEAR MYOSURE TAHIRA PRECHTER [phone] SPOKE TO HER ON 1-23-19 @ 12:23PM    mass removal      OOPHORECTOMY  03/2019    KS REMOVAL OF OVARY/TUBE(S)      ROBOT-ASSISTED LAPAROSCOPIC ABDOMINAL HYSTERECTOMY USING DA MALCOLM XI N/A 3/19/2019    Procedure: XI ROBOTIC HYSTERECTOMY;  Surgeon: Stiven Geronimo MD;  Location: [facility] OR [address] [place] [address];  Service: OB/GYN;  Laterality: N/A;  POSS DIFF INTUBATION-GLIDESCOPE NEEDED  Wt 341 lbs  T & S am of surgery    ROBOT-ASSISTED LAPAROSCOPIC SALPINGO-OOPHORECTOMY USING DA MALCOLM XI Bilateral 3/19/2019    Procedure: XI ROBOTIC SALPINGO-OOPHORECTOMY;  Surgeon: Stiven Geronimo MD;  [facility];  Service: OB/GYN;  Laterality: Bilateral;    TUBAL LIGATION         SOCIAL HISTORY:  Social History     Socioeconomic History    Marital status:      Spouse name: Not on file    Number of children: Not on file    Years of education: Not on file    Highest education level: Not on file   Occupational History    Not on file   Social Needs    Financial resource strain: Not on file    Food insecurity:     Worry: Not on file     Inability: Not on file    Transportation needs:     Medical: Not on file     Non-medical: Not on file   Tobacco Use    Smoking status: Never Smoker    Smokeless tobacco: Never Used   Substance and Sexual Activity    Alcohol use: No    Drug use: No    Sexual activity: Yes     Partners: Male   Lifestyle    Physical activity:     Days per week:  Not on file     Minutes per session: Not on file    Stress: Not on file   Relationships    Social connections:     Talks on phone: Not on file     Gets together: Not on file     Attends Cheondoism service: Not on file     Active member of club or organization: Not on file     Attends meetings of clubs or organizations: Not on file     Relationship status: Not on file   Other Topics Concern    Not on file   Social History Narrative    Not on file       FAMILY HISTORY:  Family History   Problem Relation Age of Onset    Hypertension Mother     Kidney disease Mother     Diabetes Father     Stroke Father     Heart disease Sister 49    Kidney disease Sister     Stroke Sister         in 50s       ALLERGIES AND MEDICATIONS: updated and reviewed.  Review of patient's allergies indicates:  No Known Allergies  Current Outpatient Medications   Medication Sig Dispense Refill    albuterol (PROVENTIL/VENTOLIN HFA) 90 mcg/actuation inhaler INHALE 2 PUFFS ORALLY EVERY SIX HOURS AS NEEDED FOR WHEEZING 24 Inhaler 3    diclofenac sodium (VOLTAREN) 1 % Gel Apply 2 g topically 3 (three) times daily. 100 g 0    hydroCHLOROthiazide (HYDRODIURIL) 25 MG tablet TAKE 1 TABLET BY MOUTH ONE TIME DAILY 90 tablet 3    ibuprofen (ADVIL,MOTRIN) 800 MG tablet Take 1 tablet (800 mg total) by mouth every 8 (eight) hours as needed for Pain. 40 tablet 0    mupirocin (BACTROBAN) 2 % ointment Apply topically 3 (three) times daily. for 10 days 30 g 3    phentermine 15 MG capsule Take 1 capsule (15 mg total) by mouth every morning. 30 capsule 2     No current facility-administered medications for this visit.          ROS  Review of Systems   Constitutional: Negative for activity change, appetite change, fatigue, fever and unexpected weight change.   HENT: Negative.  Negative for ear discharge, ear pain, rhinorrhea and sore throat.    Eyes: Negative.    Respiratory: Negative for apnea, cough, chest tightness, shortness of breath and wheezing.   "  Cardiovascular: Negative for chest pain, palpitations and leg swelling.   Gastrointestinal: Negative for abdominal distention, abdominal pain, constipation, diarrhea and vomiting.   Endocrine: Negative for cold intolerance, heat intolerance, polydipsia and polyuria.   Genitourinary: Negative for decreased urine volume and urgency.   Musculoskeletal: Negative.    Skin: Negative for rash.   Neurological: Negative for dizziness and headaches.   Hematological: Does not bruise/bleed easily.   Psychiatric/Behavioral: Negative for agitation, sleep disturbance and suicidal ideas.           Physical Exam  Vitals:    11/12/19 1120   BP: 120/80   BP Location: Left arm   Patient Position: Sitting   BP Method: Small (Manual)   Pulse: 97   Temp: 98.4 °F (36.9 °C)   TempSrc: Oral   SpO2: 97%   Weight: (!) 161.5 kg (356 lb 0.7 oz)   Height: 5' 2" (1.575 m)    Body mass index is 65.12 kg/m².  Weight: (!) 161.5 kg (356 lb 0.7 oz)   Height: 5' 2" (157.5 cm)   Physical Exam   Constitutional: She is oriented to person, place, and time. She appears well-developed and well-nourished.   HENT:   Head: Normocephalic and atraumatic.   Right Ear: External ear normal.   Left Ear: External ear normal.   Nose: Nose normal.   Mouth/Throat: Oropharynx is clear and moist.   Eyes: Pupils are equal, round, and reactive to light. Conjunctivae and EOM are normal.   Cardiovascular: Normal rate, regular rhythm and normal heart sounds.   Pulmonary/Chest: Effort normal and breath sounds normal.   Neurological: She is alert and oriented to person, place, and time.   Skin: Skin is warm and dry.   Vitals reviewed.        Health Maintenance       Date Due Completion Date    TETANUS VACCINE 03/29/1973 ---    Pneumococcal Vaccine (Highest Risk) (1 of 3 - PCV13) 03/29/1974 ---    Shingles Vaccine (1 of 2) 03/29/2005 ---    Influenza Vaccine (1) 11/11/2020 (Originally 9/1/2019) ---    Mammogram 10/15/2020 10/15/2019    Lipid Panel 10/15/2024 10/15/2019    " Colonoscopy 11/03/2026 11/3/2016    Override on 5/7/2010: Done (Dr. Roberts, Elena DORADO)              Patient note was created using [employer].  Any errors in syntax or even information may not have been identified and edited on initial review prior to signing this note.

## 2020-01-31 ENCOUNTER — TELEPHONE (OUTPATIENT)
Dept: GYNECOLOGIC ONCOLOGY | Facility: CLINIC | Age: 65
End: 2020-01-31

## 2020-02-03 ENCOUNTER — OFFICE VISIT (OUTPATIENT)
Dept: GYNECOLOGIC ONCOLOGY | Facility: CLINIC | Age: 65
End: 2020-02-03
Payer: COMMERCIAL

## 2020-02-03 VITALS
WEIGHT: 293 LBS | SYSTOLIC BLOOD PRESSURE: 161 MMHG | HEART RATE: 110 BPM | DIASTOLIC BLOOD PRESSURE: 67 MMHG | BODY MASS INDEX: 65.32 KG/M2

## 2020-02-03 DIAGNOSIS — C54.1 ENDOMETRIAL CANCER: Primary | ICD-10-CM

## 2020-02-03 DIAGNOSIS — I10 ESSENTIAL HYPERTENSION: ICD-10-CM

## 2020-02-03 DIAGNOSIS — R73.03 PREDIABETES: ICD-10-CM

## 2020-02-03 PROCEDURE — 99214 PR OFFICE/OUTPT VISIT, EST, LEVL IV, 30-39 MIN: ICD-10-PCS | Mod: S$GLB,,, | Performed by: OBSTETRICS & GYNECOLOGY

## 2020-02-03 PROCEDURE — 3077F PR MOST RECENT SYSTOLIC BLOOD PRESSURE >= 140 MM HG: ICD-10-PCS | Mod: CPTII,S$GLB,, | Performed by: OBSTETRICS & GYNECOLOGY

## 2020-02-03 PROCEDURE — 99214 OFFICE O/P EST MOD 30 MIN: CPT | Mod: S$GLB,,, | Performed by: OBSTETRICS & GYNECOLOGY

## 2020-02-03 PROCEDURE — 99999 PR PBB SHADOW E&M-EST. PATIENT-LVL III: ICD-10-PCS | Mod: PBBFAC,,, | Performed by: OBSTETRICS & GYNECOLOGY

## 2020-02-03 PROCEDURE — 99999 PR PBB SHADOW E&M-EST. PATIENT-LVL III: CPT | Mod: PBBFAC,,, | Performed by: OBSTETRICS & GYNECOLOGY

## 2020-02-03 PROCEDURE — 3008F PR BODY MASS INDEX (BMI) DOCUMENTED: ICD-10-PCS | Mod: CPTII,S$GLB,, | Performed by: OBSTETRICS & GYNECOLOGY

## 2020-02-03 PROCEDURE — 3077F SYST BP >= 140 MM HG: CPT | Mod: CPTII,S$GLB,, | Performed by: OBSTETRICS & GYNECOLOGY

## 2020-02-03 PROCEDURE — 3078F DIAST BP <80 MM HG: CPT | Mod: CPTII,S$GLB,, | Performed by: OBSTETRICS & GYNECOLOGY

## 2020-02-03 PROCEDURE — 3008F BODY MASS INDEX DOCD: CPT | Mod: CPTII,S$GLB,, | Performed by: OBSTETRICS & GYNECOLOGY

## 2020-02-03 PROCEDURE — 3078F PR MOST RECENT DIASTOLIC BLOOD PRESSURE < 80 MM HG: ICD-10-PCS | Mod: CPTII,S$GLB,, | Performed by: OBSTETRICS & GYNECOLOGY

## 2020-02-03 NOTE — PROGRESS NOTES
Subjective:      Patient ID: Hilary Wilson is a 64 y.o. female.    Chief Complaint: Endometrial Cancer (3mth f/u)    Treatment History  Clinical Stage IAG2 endometrial cancer (mainly in polyp, 3mm of 27 mm invasion)  RALH/BSO/[facility] for uterine extraction  Morbid obesity precluding staging    HPI  Here today for continued surveillance.  Denies VB, F/C, N/V.  No new symptoms per patient.  Review of Systems   Constitutional: Negative for activity change, appetite change, chills, fatigue and fever.   HENT: Negative for hearing loss, mouth sores, nosebleeds, sore throat and tinnitus.    Eyes: Negative for visual disturbance.   Respiratory: Negative for cough, chest tightness, shortness of breath and wheezing.    Cardiovascular: Negative for chest pain and leg swelling.   Gastrointestinal: Negative for abdominal distention, abdominal pain, blood in stool, constipation, diarrhea, nausea and vomiting.   Genitourinary: Negative for dysuria, flank pain, frequency, hematuria, pelvic pain, vaginal bleeding, vaginal discharge and vaginal pain.   Musculoskeletal: Negative for arthralgias and back pain.   Skin: Negative for rash.   Neurological: Negative for dizziness, seizures, syncope, weakness and numbness.   Hematological: Does not bruise/bleed easily.   Psychiatric/Behavioral: Negative for confusion and sleep disturbance. The patient is not nervous/anxious.        Objective:   Physical Exam:   Constitutional: She appears well-developed and well-nourished. No distress.    HENT:   Head: Normocephalic and atraumatic.    Eyes: No scleral icterus.    Neck: Normal range of motion. Neck supple.    Cardiovascular: Normal rate and intact distal pulses.  Exam reveals no cyanosis and no edema.     Pulmonary/Chest: Effort normal. No respiratory distress. She exhibits no tenderness.        Abdominal: Soft. She exhibits no distension (incisions well healed), no fluid wave, no ascites and no mass. There is no tenderness. There is  no rebound and no guarding. No hernia.         Genitourinary: Rectum normal and vagina normal. Pelvic exam was performed with patient supine. There is no rash, tenderness or lesion on the right labia. There is no rash, tenderness or lesion on the left labia. Uterus is absent. There is an absent adnexa. Right adnexum displays no mass, no tenderness and no fullness. Left adnexum displays no mass, no tenderness and no fullness. No bleeding (cuff without lesion) or unspecified prolapse of vaginal walls in the vagina. No vaginal discharge found. Vaginal cuff normal.Labial bartholins normal.Cervix exhibits absence.              Lymphadenopathy:     She has no cervical adenopathy.     Skin: No cyanosis.        Assessment:     1. Endometrial cancer    2. Prediabetes    3. BMI 60.0-69.9, adult    4. Essential hypertension        Plan:       BRITTNEY on exam today.  Will continue surveillance at 3 month intervals.  RTC 3 months

## 2020-02-13 ENCOUNTER — OFFICE VISIT (OUTPATIENT)
Dept: FAMILY MEDICINE | Facility: CLINIC | Age: 65
End: 2020-02-13
Payer: COMMERCIAL

## 2020-02-13 ENCOUNTER — LAB VISIT (OUTPATIENT)
Dept: LAB | Facility: HOSPITAL | Age: 65
End: 2020-02-13
Attending: FAMILY MEDICINE
Payer: COMMERCIAL

## 2020-02-13 VITALS
HEIGHT: 62 IN | TEMPERATURE: 99 F | DIASTOLIC BLOOD PRESSURE: 70 MMHG | BODY MASS INDEX: 53.92 KG/M2 | WEIGHT: 293 LBS | SYSTOLIC BLOOD PRESSURE: 120 MMHG | HEART RATE: 86 BPM | OXYGEN SATURATION: 97 %

## 2020-02-13 DIAGNOSIS — I10 ESSENTIAL HYPERTENSION: Primary | ICD-10-CM

## 2020-02-13 DIAGNOSIS — I10 ESSENTIAL HYPERTENSION: ICD-10-CM

## 2020-02-13 DIAGNOSIS — E11.9 TYPE 2 DIABETES MELLITUS WITHOUT COMPLICATION, WITHOUT LONG-TERM CURRENT USE OF INSULIN: ICD-10-CM

## 2020-02-13 PROCEDURE — 36415 COLL VENOUS BLD VENIPUNCTURE: CPT | Mod: PO

## 2020-02-13 PROCEDURE — 99999 PR PBB SHADOW E&M-EST. PATIENT-LVL III: CPT | Mod: PBBFAC,,, | Performed by: FAMILY MEDICINE

## 2020-02-13 PROCEDURE — 3078F PR MOST RECENT DIASTOLIC BLOOD PRESSURE < 80 MM HG: ICD-10-PCS | Mod: CPTII,S$GLB,, | Performed by: FAMILY MEDICINE

## 2020-02-13 PROCEDURE — 3008F PR BODY MASS INDEX (BMI) DOCUMENTED: ICD-10-PCS | Mod: CPTII,S$GLB,, | Performed by: FAMILY MEDICINE

## 2020-02-13 PROCEDURE — 99999 PR PBB SHADOW E&M-EST. PATIENT-LVL III: ICD-10-PCS | Mod: PBBFAC,,, | Performed by: FAMILY MEDICINE

## 2020-02-13 PROCEDURE — 3044F PR MOST RECENT HEMOGLOBIN A1C LEVEL <7.0%: ICD-10-PCS | Mod: CPTII,S$GLB,, | Performed by: FAMILY MEDICINE

## 2020-02-13 PROCEDURE — 99214 OFFICE O/P EST MOD 30 MIN: CPT | Mod: S$GLB,,, | Performed by: FAMILY MEDICINE

## 2020-02-13 PROCEDURE — 3074F SYST BP LT 130 MM HG: CPT | Mod: CPTII,S$GLB,, | Performed by: FAMILY MEDICINE

## 2020-02-13 PROCEDURE — 3044F HG A1C LEVEL LT 7.0%: CPT | Mod: CPTII,S$GLB,, | Performed by: FAMILY MEDICINE

## 2020-02-13 PROCEDURE — 3008F BODY MASS INDEX DOCD: CPT | Mod: CPTII,S$GLB,, | Performed by: FAMILY MEDICINE

## 2020-02-13 PROCEDURE — 83036 HEMOGLOBIN GLYCOSYLATED A1C: CPT

## 2020-02-13 PROCEDURE — 3074F PR MOST RECENT SYSTOLIC BLOOD PRESSURE < 130 MM HG: ICD-10-PCS | Mod: CPTII,S$GLB,, | Performed by: FAMILY MEDICINE

## 2020-02-13 PROCEDURE — 3078F DIAST BP <80 MM HG: CPT | Mod: CPTII,S$GLB,, | Performed by: FAMILY MEDICINE

## 2020-02-13 PROCEDURE — 99214 PR OFFICE/OUTPT VISIT, EST, LEVL IV, 30-39 MIN: ICD-10-PCS | Mod: S$GLB,,, | Performed by: FAMILY MEDICINE

## 2020-02-13 NOTE — PROGRESS NOTES
Assessment & Plan  Problem List Items Addressed This Visit        Cardiac/Vascular    Essential hypertension - Primary    Current Assessment & Plan     Pt is currently stable on medication regimen.  Continue current therapy as scheduled.  Contact office with any questions about adjustments on medications.            Relevant Orders    Hemoglobin A1c (Completed)       Endocrine    Type 2 diabetes mellitus without complication, without long-term current use of insulin    Current Assessment & Plan     Diet controlled.          Relevant Orders    Hemoglobin A1c (Completed)            Health Maintenance reviewed.    Follow-up: No follow-ups on file.    ______________________________________________________________________    Chief Complaint  Chief Complaint   Patient presents with    Diabetes       HPI  Hilary Wilson is a 64 y.o. female with multiple medical diagnoses as listed in the medical history and problem list that presents for diabetes.  Pt is known to me with last appointment 11/12/2019.    Patient denies any new symptoms including chest pain, SOB, blurry vision, N/V, diarrhea.  Diabetes:  Not monitoring blood sugars at this time.   She is working on her diet.  She is not currently on medications.  Previous hemoglobin A1c was 6.6.        PAST MEDICAL HISTORY:  Past Medical History:   Diagnosis Date    Asthma     Diabetes mellitus     Hyperlipidemia     Hypertension     JOSUÉ on CPAP     Uterine cancer     Vaginal delivery     x3       PAST SURGICAL HISTORY:  Past Surgical History:   Procedure Laterality Date    BREAST BIOPSY Left     COLONOSCOPY N/A 11/3/2016    Procedure: COLONOSCOPY;  Surgeon: Zhang Diez MD;  Location: [facility] ENDO;  Service: Endoscopy;  Laterality: N/A;    HYSTERECTOMY  03/2019    HYSTEROSCOPY WITH DILATION AND CURETTAGE OF UTERUS N/A 1/25/2019    Procedure: HYSTEROSCOPY, WITH DILATION AND CURETTAGE OF UTERUS;  Surgeon: Elías Nolasco MD;  Location: [facility] OR;   Service: OB/GYN;  Laterality: N/A;  RN PRE OP 1-22-19---BMI--61.3  JAMES HOFFMANN PRECHTER [phone] SPOKE TO HER ON 1-23-19 @ 12:23PM    mass removal      OOPHORECTOMY  03/2019    RI REMOVAL OF OVARY/TUBE(S)      ROBOT-ASSISTED LAPAROSCOPIC ABDOMINAL HYSTERECTOMY USING DA MALCOLM XI N/A 3/19/2019    Procedure: XI ROBOTIC HYSTERECTOMY;  Surgeon: Stiven Geronimo MD;  Location: [facility] OR [address] [place] [address];  Service: OB/GYN;  Laterality: N/A;  POSS DIFF INTUBATION-GLIDESCOPE NEEDED  Wt 341 lbs  T & S am of surgery    ROBOT-ASSISTED LAPAROSCOPIC SALPINGO-OOPHORECTOMY USING DA MALCOLM XI Bilateral 3/19/2019    Procedure: XI ROBOTIC SALPINGO-OOPHORECTOMY;  Surgeon: Stiven Geronimo MD;  Location: [facility] OR [address] [place] [address];  Service: OB/GYN;  Laterality: Bilateral;    TUBAL LIGATION         SOCIAL HISTORY:  Social History     Socioeconomic History    Marital status:      Spouse name: Not on file    Number of children: Not on file    Years of education: Not on file    Highest education level: Not on file   Occupational History    Not on file   Social Needs    Financial resource strain: Not on file    Food insecurity:     Worry: Not on file     Inability: Not on file    Transportation needs:     Medical: Not on file     Non-medical: Not on file   Tobacco Use    Smoking status: Never Smoker    Smokeless tobacco: Never Used   Substance and Sexual Activity    Alcohol use: No    Drug use: No    Sexual activity: Yes     Partners: Male   Lifestyle    Physical activity:     Days per week: Not on file     Minutes per session: Not on file    Stress: Not on file   Relationships    Social connections:     Talks on phone: Not on file     Gets together: Not on file     Attends Scientology service: Not on file     Active member of club or organization: Not on file     Attends meetings of clubs or organizations: Not on file     Relationship status: Not on file   Other Topics Concern    Not on file   Social History Narrative     Not on file       FAMILY HISTORY:  Family History   Problem Relation Age of Onset    Hypertension Mother     Kidney disease Mother     Diabetes Father     Stroke Father     Heart disease Sister 49    Kidney disease Sister     Stroke Sister         in 50s       ALLERGIES AND MEDICATIONS: updated and reviewed.  Review of patient's allergies indicates:  No Known Allergies  Current Outpatient Medications   Medication Sig Dispense Refill    albuterol (PROVENTIL/VENTOLIN HFA) 90 mcg/actuation inhaler INHALE 2 PUFFS ORALLY EVERY SIX HOURS AS NEEDED FOR WHEEZING 24 Inhaler 3    diclofenac sodium (VOLTAREN) 1 % Gel Apply 2 g topically 3 (three) times daily. 100 g 0    hydroCHLOROthiazide (HYDRODIURIL) 25 MG tablet TAKE 1 TABLET BY MOUTH ONE TIME DAILY 90 tablet 3    ibuprofen (ADVIL,MOTRIN) 800 MG tablet Take 1 tablet (800 mg total) by mouth every 8 (eight) hours as needed for Pain. 40 tablet 0     No current facility-administered medications for this visit.          ROS  Review of Systems   Constitutional: Negative for activity change, appetite change, fatigue, fever and unexpected weight change.   HENT: Negative.  Negative for ear discharge, ear pain, rhinorrhea and sore throat.    Eyes: Negative.    Respiratory: Negative for apnea, cough, chest tightness, shortness of breath and wheezing.    Cardiovascular: Negative for chest pain, palpitations and leg swelling.   Gastrointestinal: Negative for abdominal distention, abdominal pain, constipation, diarrhea and vomiting.   Endocrine: Negative for cold intolerance, heat intolerance, polydipsia and polyuria.   Genitourinary: Negative for decreased urine volume and urgency.   Musculoskeletal: Negative.    Skin: Negative for rash.   Neurological: Negative for dizziness and headaches.   Hematological: Does not bruise/bleed easily.   Psychiatric/Behavioral: Negative for agitation, sleep disturbance and suicidal ideas.         Physical Exam  Vitals:    02/13/20 1142  "  BP: 120/70   BP Location: Left arm   Patient Position: Sitting   BP Method: Large (Manual)   Pulse: 86   Temp: 98.5 °F (36.9 °C)   TempSrc: Oral   SpO2: 97%   Weight: (!) 158.4 kg (349 lb 3.3 oz)   Height: 5' 2" (1.575 m)    Body mass index is 63.87 kg/m².  Weight: (!) 158.4 kg (349 lb 3.3 oz)   Height: 5' 2" (157.5 cm)   Physical Exam   Constitutional: She is oriented to person, place, and time. She appears well-developed and well-nourished.   HENT:   Head: Normocephalic and atraumatic.   Right Ear: External ear normal.   Left Ear: External ear normal.   Nose: Nose normal.   Mouth/Throat: Oropharynx is clear and moist.   Eyes: Pupils are equal, round, and reactive to light. Conjunctivae and EOM are normal.   Cardiovascular: Normal rate, regular rhythm and normal heart sounds.   Pulmonary/Chest: Effort normal and breath sounds normal.   Neurological: She is alert and oriented to person, place, and time.   Skin: Skin is warm and dry.   Vitals reviewed.      Health Maintenance       Date Due Completion Date    HIV Screening 03/29/1970 ---    TETANUS VACCINE 03/29/1973 ---    Pneumococcal Vaccine (Highest Risk) (1 of 3 - PCV13) 03/29/1974 ---    Shingles Vaccine (1 of 2) 03/29/2005 ---    Influenza Vaccine (1) 11/11/2020 (Originally 9/1/2019) ---    Mammogram 10/15/2020 10/15/2019    Lipid Panel 10/15/2024 10/15/2019    Colonoscopy 11/03/2026 11/3/2016    Override on 5/7/2010: Done (Dr. Rboerts, Elena OFE)              Patient note was created using [employer].  Any errors in syntax or even information may not have been identified and edited on initial review prior to signing this note.  "

## 2020-02-14 LAB
ESTIMATED AVG GLUCOSE: 143 MG/DL (ref 68–131)
HBA1C MFR BLD HPLC: 6.6 % (ref 4–5.6)

## 2020-03-17 ENCOUNTER — PATIENT MESSAGE (OUTPATIENT)
Dept: FAMILY MEDICINE | Facility: CLINIC | Age: 65
End: 2020-03-17

## 2020-03-17 DIAGNOSIS — E66.01 MORBID OBESITY WITH BMI OF 60.0-69.9, ADULT: Primary | ICD-10-CM

## 2020-03-20 RX ORDER — PHENTERMINE HYDROCHLORIDE 37.5 MG/1
37.5 TABLET ORAL
Qty: 30 TABLET | Refills: 2 | Status: SHIPPED | OUTPATIENT
Start: 2020-03-20 | End: 2020-04-19

## 2020-06-03 ENCOUNTER — TELEPHONE (OUTPATIENT)
Dept: FAMILY MEDICINE | Facility: CLINIC | Age: 65
End: 2020-06-03

## 2020-06-03 NOTE — TELEPHONE ENCOUNTER
Called patient and informed her that her PCP doesn't have anything available this week and the soonest available was 06/09/2020 with a NP. She declined that appointment, I also informed her she could go to urgent care. She stated that is what she was going to do.

## 2020-06-03 NOTE — TELEPHONE ENCOUNTER
----- Message from Janee Enrique sent at 6/3/2020 11:17 AM CDT -----  Contact: Patient [phone]  .Name of Caller Patient  Reason for Visit/Symptoms Poss Cyst-buttocks  Best Contact Number or Confirm if Mychart Preferred [phone]  Preferred Date/Time of Appointment  This week.  Interested in Virtual Visit (yes/no) No

## 2020-06-15 RX ORDER — HYDROCHLOROTHIAZIDE 25 MG/1
25 TABLET ORAL DAILY
Qty: 90 TABLET | Refills: 3 | Status: SHIPPED | OUTPATIENT
Start: 2020-06-15 | End: 2021-05-24

## 2020-10-01 ENCOUNTER — PATIENT MESSAGE (OUTPATIENT)
Dept: OTHER | Facility: OTHER | Age: 65
End: 2020-10-01

## 2020-10-16 DIAGNOSIS — E11.9 TYPE 2 DIABETES MELLITUS WITHOUT COMPLICATION, WITHOUT LONG-TERM CURRENT USE OF INSULIN: ICD-10-CM

## 2020-12-11 ENCOUNTER — PATIENT MESSAGE (OUTPATIENT)
Dept: OTHER | Facility: OTHER | Age: 65
End: 2020-12-11

## 2020-12-15 ENCOUNTER — TELEPHONE (OUTPATIENT)
Dept: FAMILY MEDICINE | Facility: CLINIC | Age: 65
End: 2020-12-15

## 2020-12-15 DIAGNOSIS — Z12.31 BREAST CANCER SCREENING BY MAMMOGRAM: Primary | ICD-10-CM

## 2020-12-15 NOTE — TELEPHONE ENCOUNTER
----- Message from Jame West sent at 12/15/2020 11:27 AM CST -----  Type: Patient Call Back    Who called: pt     What is the request in detail: pt would like to speak with someone about getting orders for her Cpap machine.     Can the clinic reply by [facility]? No     Would the patient rather a call back or a response via [facility]? Call back     Best call back number: [phone]     Additional Information:      Thank You

## 2020-12-15 NOTE — TELEPHONE ENCOUNTER
----- Message from Jame West sent at 12/15/2020 11:28 AM CST -----  Type:  Mammogram    Caller is requesting to schedule their annual mammogram appointment.  Order is not listed in EPIC.  Please enter order and contact patient to schedule.    Name of Caller:  Pt     Where would they like the mammogram performed?  [facility]    Would the patient rather a call back or a response via [facility]? Call back     Best Call Back Number:  [phone]    Additional Information:

## 2020-12-16 NOTE — TELEPHONE ENCOUNTER
Spoke with patient said that she needs a new machine because she is unable to get supplies for her old machine. She don't know her setting for the machine but she deals with [facility]

## 2020-12-30 ENCOUNTER — HOSPITAL ENCOUNTER (OUTPATIENT)
Dept: RADIOLOGY | Facility: HOSPITAL | Age: 65
Discharge: HOME | End: 2020-12-30
Attending: FAMILY MEDICINE
Payer: MEDICARE

## 2020-12-30 DIAGNOSIS — Z12.31 BREAST CANCER SCREENING BY MAMMOGRAM: ICD-10-CM

## 2020-12-30 PROCEDURE — 77063 BREAST TOMOSYNTHESIS BI: CPT | Mod: 26,,, | Performed by: RADIOLOGY

## 2020-12-30 PROCEDURE — 77067 MAMMO DIGITAL SCREENING BILAT WITH TOMO: ICD-10-PCS | Mod: 26,,, | Performed by: RADIOLOGY

## 2020-12-30 PROCEDURE — 77063 MAMMO DIGITAL SCREENING BILAT WITH TOMO: ICD-10-PCS | Mod: 26,,, | Performed by: RADIOLOGY

## 2020-12-30 PROCEDURE — 77067 SCR MAMMO BI INCL CAD: CPT | Mod: TC,PO

## 2020-12-30 PROCEDURE — 77067 SCR MAMMO BI INCL CAD: CPT | Mod: 26,,, | Performed by: RADIOLOGY

## 2021-03-10 LAB
LEFT EYE DM RETINOPATHY: NEGATIVE
RIGHT EYE DM RETINOPATHY: NEGATIVE

## 2021-05-03 ENCOUNTER — OFFICE VISIT (OUTPATIENT)
Dept: FAMILY MEDICINE | Facility: CLINIC | Age: 66
End: 2021-05-03
Payer: MEDICARE

## 2021-05-03 VITALS
OXYGEN SATURATION: 97 % | HEIGHT: 62 IN | BODY MASS INDEX: 53.92 KG/M2 | TEMPERATURE: 99 F | DIASTOLIC BLOOD PRESSURE: 80 MMHG | HEART RATE: 92 BPM | SYSTOLIC BLOOD PRESSURE: 120 MMHG | WEIGHT: 293 LBS

## 2021-05-03 DIAGNOSIS — M17.12 LOCALIZED OSTEOARTHRITIS OF LEFT KNEE: ICD-10-CM

## 2021-05-03 DIAGNOSIS — Z00.00 ANNUAL PHYSICAL EXAM: Primary | ICD-10-CM

## 2021-05-03 DIAGNOSIS — J45.909 UNCOMPLICATED ASTHMA: ICD-10-CM

## 2021-05-03 DIAGNOSIS — I10 ESSENTIAL HYPERTENSION: ICD-10-CM

## 2021-05-03 DIAGNOSIS — E11.9 TYPE 2 DIABETES MELLITUS WITHOUT COMPLICATION, WITHOUT LONG-TERM CURRENT USE OF INSULIN: ICD-10-CM

## 2021-05-03 DIAGNOSIS — C54.1 ENDOMETRIAL CANCER: ICD-10-CM

## 2021-05-03 PROCEDURE — 99397 PER PM REEVAL EST PAT 65+ YR: CPT | Mod: S$GLB,,, | Performed by: FAMILY MEDICINE

## 2021-05-03 PROCEDURE — 99499 RISK ADDL DX/OHS AUDIT: ICD-10-PCS | Mod: S$GLB,,, | Performed by: FAMILY MEDICINE

## 2021-05-03 PROCEDURE — 99999 PR PBB SHADOW E&M-EST. PATIENT-LVL III: ICD-10-PCS | Mod: PBBFAC,,, | Performed by: FAMILY MEDICINE

## 2021-05-03 PROCEDURE — 99213 OFFICE O/P EST LOW 20 MIN: CPT | Mod: PBBFAC,PO | Performed by: FAMILY MEDICINE

## 2021-05-03 PROCEDURE — 99999 PR PBB SHADOW E&M-EST. PATIENT-LVL III: CPT | Mod: PBBFAC,,, | Performed by: FAMILY MEDICINE

## 2021-05-03 PROCEDURE — 99397 PR PREVENTIVE VISIT,EST,65 & OVER: ICD-10-PCS | Mod: S$GLB,,, | Performed by: FAMILY MEDICINE

## 2021-05-03 PROCEDURE — 99499 UNLISTED E&M SERVICE: CPT | Mod: S$GLB,,, | Performed by: FAMILY MEDICINE

## 2021-05-03 RX ORDER — PHENTERMINE HYDROCHLORIDE 37.5 MG/1
37.5 TABLET ORAL
Qty: 30 TABLET | Refills: 2 | Status: SHIPPED | OUTPATIENT
Start: 2021-05-03 | End: 2021-06-02

## 2021-05-03 RX ORDER — ALBUTEROL SULFATE 90 UG/1
AEROSOL, METERED RESPIRATORY (INHALATION)
Qty: 24 G | Refills: 2 | Status: SHIPPED | OUTPATIENT
Start: 2021-05-03 | End: 2022-05-30 | Stop reason: SDUPTHER

## 2021-05-03 RX ORDER — DICLOFENAC SODIUM 10 MG/G
2 GEL TOPICAL 3 TIMES DAILY
Qty: 100 G | Refills: 0 | Status: SHIPPED | OUTPATIENT
Start: 2021-05-03 | End: 2022-04-08

## 2021-05-11 ENCOUNTER — LAB VISIT (OUTPATIENT)
Dept: LAB | Facility: HOSPITAL | Age: 66
End: 2021-05-11
Attending: FAMILY MEDICINE
Payer: MEDICARE

## 2021-05-11 DIAGNOSIS — Z00.00 ANNUAL PHYSICAL EXAM: ICD-10-CM

## 2021-05-11 DIAGNOSIS — E11.9 TYPE 2 DIABETES MELLITUS WITHOUT COMPLICATION, WITHOUT LONG-TERM CURRENT USE OF INSULIN: ICD-10-CM

## 2021-05-11 DIAGNOSIS — I10 ESSENTIAL HYPERTENSION: ICD-10-CM

## 2021-05-11 PROCEDURE — 36415 COLL VENOUS BLD VENIPUNCTURE: CPT | Mod: PO | Performed by: FAMILY MEDICINE

## 2021-05-11 PROCEDURE — 84443 ASSAY THYROID STIM HORMONE: CPT | Performed by: FAMILY MEDICINE

## 2021-05-11 PROCEDURE — 83036 HEMOGLOBIN GLYCOSYLATED A1C: CPT | Performed by: FAMILY MEDICINE

## 2021-05-11 PROCEDURE — 80053 COMPREHEN METABOLIC PANEL: CPT | Performed by: FAMILY MEDICINE

## 2021-05-11 PROCEDURE — 85025 COMPLETE CBC W/AUTO DIFF WBC: CPT | Performed by: FAMILY MEDICINE

## 2021-05-11 PROCEDURE — 80061 LIPID PANEL: CPT | Performed by: FAMILY MEDICINE

## 2021-05-12 LAB
ALBUMIN SERPL BCP-MCNC: 3.5 G/DL (ref 3.5–5.2)
ALP SERPL-CCNC: 82 U/L (ref 55–135)
ALT SERPL W/O P-5'-P-CCNC: 15 U/L (ref 10–44)
ANION GAP SERPL CALC-SCNC: 9 MMOL/L (ref 8–16)
AST SERPL-CCNC: 17 U/L (ref 10–40)
BASOPHILS # BLD AUTO: 0.03 K/UL (ref 0–0.2)
BASOPHILS NFR BLD: 0.5 % (ref 0–1.9)
BILIRUB SERPL-MCNC: 0.4 MG/DL (ref 0.1–1)
BUN SERPL-MCNC: 16 MG/DL (ref 8–23)
CALCIUM SERPL-MCNC: 10 MG/DL (ref 8.7–10.5)
CHLORIDE SERPL-SCNC: 105 MMOL/L (ref 95–110)
CHOLEST SERPL-MCNC: 196 MG/DL (ref 120–199)
CHOLEST/HDLC SERPL: 4.5 {RATIO} (ref 2–5)
CO2 SERPL-SCNC: 27 MMOL/L (ref 23–29)
CREAT SERPL-MCNC: 0.8 MG/DL (ref 0.5–1.4)
DIFFERENTIAL METHOD: ABNORMAL
EOSINOPHIL # BLD AUTO: 0.1 K/UL (ref 0–0.5)
EOSINOPHIL NFR BLD: 1.7 % (ref 0–8)
ERYTHROCYTE [DISTWIDTH] IN BLOOD BY AUTOMATED COUNT: 14.5 % (ref 11.5–14.5)
EST. GFR  (AFRICAN AMERICAN): >60 ML/MIN/1.73 M^2
EST. GFR  (NON AFRICAN AMERICAN): >60 ML/MIN/1.73 M^2
ESTIMATED AVG GLUCOSE: 134 MG/DL (ref 68–131)
GLUCOSE SERPL-MCNC: 97 MG/DL (ref 70–110)
HBA1C MFR BLD: 6.3 % (ref 4–5.6)
HCT VFR BLD AUTO: 42.5 % (ref 37–48.5)
HDLC SERPL-MCNC: 44 MG/DL (ref 40–75)
HDLC SERPL: 22.4 % (ref 20–50)
HGB BLD-MCNC: 13 G/DL (ref 12–16)
IMM GRANULOCYTES # BLD AUTO: 0.02 K/UL (ref 0–0.04)
IMM GRANULOCYTES NFR BLD AUTO: 0.3 % (ref 0–0.5)
LDLC SERPL CALC-MCNC: 133 MG/DL (ref 63–159)
LYMPHOCYTES # BLD AUTO: 1.4 K/UL (ref 1–4.8)
LYMPHOCYTES NFR BLD: 22 % (ref 18–48)
MCH RBC QN AUTO: 26.7 PG (ref 27–31)
MCHC RBC AUTO-ENTMCNC: 30.6 G/DL (ref 32–36)
MCV RBC AUTO: 87 FL (ref 82–98)
MONOCYTES # BLD AUTO: 0.6 K/UL (ref 0.3–1)
MONOCYTES NFR BLD: 9.5 % (ref 4–15)
NEUTROPHILS # BLD AUTO: 4.3 K/UL (ref 1.8–7.7)
NEUTROPHILS NFR BLD: 66 % (ref 38–73)
NONHDLC SERPL-MCNC: 152 MG/DL
NRBC BLD-RTO: 0 /100 WBC
PLATELET # BLD AUTO: 188 K/UL (ref 150–450)
PMV BLD AUTO: 11.6 FL (ref 9.2–12.9)
POTASSIUM SERPL-SCNC: 4.9 MMOL/L (ref 3.5–5.1)
PROT SERPL-MCNC: 8.2 G/DL (ref 6–8.4)
RBC # BLD AUTO: 4.86 M/UL (ref 4–5.4)
SODIUM SERPL-SCNC: 141 MMOL/L (ref 136–145)
TRIGL SERPL-MCNC: 95 MG/DL (ref 30–150)
TSH SERPL DL<=0.005 MIU/L-ACNC: 0.75 UIU/ML (ref 0.4–4)
WBC # BLD AUTO: 6.45 K/UL (ref 3.9–12.7)

## 2021-05-18 ENCOUNTER — TELEPHONE (OUTPATIENT)
Dept: FAMILY MEDICINE | Facility: CLINIC | Age: 66
End: 2021-05-18

## 2021-05-18 DIAGNOSIS — L02.92 BOIL: Primary | ICD-10-CM

## 2021-05-18 RX ORDER — AMOXICILLIN 500 MG/1
500 TABLET, FILM COATED ORAL EVERY 12 HOURS
Qty: 20 TABLET | Refills: 0 | Status: SHIPPED | OUTPATIENT
Start: 2021-05-18 | End: 2021-05-28

## 2021-06-02 ENCOUNTER — PATIENT OUTREACH (OUTPATIENT)
Dept: ADMINISTRATIVE | Facility: HOSPITAL | Age: 66
End: 2021-06-02

## 2021-06-02 DIAGNOSIS — N95.8 OTHER SPECIFIED MENOPAUSAL AND PERIMENOPAUSAL DISORDERS: Primary | ICD-10-CM

## 2021-06-08 ENCOUNTER — PATIENT OUTREACH (OUTPATIENT)
Dept: ADMINISTRATIVE | Facility: HOSPITAL | Age: 66
End: 2021-06-08

## 2021-07-29 ENCOUNTER — HOSPITAL ENCOUNTER (OUTPATIENT)
Dept: RADIOLOGY | Facility: CLINIC | Age: 66
Discharge: HOME | End: 2021-07-29
Attending: FAMILY MEDICINE
Payer: MEDICARE

## 2021-07-29 DIAGNOSIS — N95.8 OTHER SPECIFIED MENOPAUSAL AND PERIMENOPAUSAL DISORDERS: ICD-10-CM

## 2021-07-29 PROCEDURE — 77080 DXA BONE DENSITY AXIAL: CPT | Mod: TC,PO

## 2021-07-29 PROCEDURE — 77080 DXA BONE DENSITY AXIAL: CPT | Mod: 26,,, | Performed by: INTERNAL MEDICINE

## 2021-07-29 PROCEDURE — 77080 DEXA BONE DENSITY SPINE HIP: ICD-10-PCS | Mod: 26,,, | Performed by: INTERNAL MEDICINE

## 2021-11-09 ENCOUNTER — PATIENT MESSAGE (OUTPATIENT)
Dept: ADMINISTRATIVE | Facility: HOSPITAL | Age: 66
End: 2021-11-09
Payer: MEDICARE

## 2021-12-07 ENCOUNTER — PES CALL (OUTPATIENT)
Dept: ADMINISTRATIVE | Facility: CLINIC | Age: 66
End: 2021-12-07
Payer: MEDICARE

## 2022-01-14 ENCOUNTER — PES CALL (OUTPATIENT)
Dept: ADMINISTRATIVE | Facility: CLINIC | Age: 67
End: 2022-01-14
Payer: MEDICARE

## 2022-01-28 LAB — CRC RECOMMENDATION EXT: NORMAL

## 2022-02-01 ENCOUNTER — TELEPHONE (OUTPATIENT)
Dept: FAMILY MEDICINE | Facility: CLINIC | Age: 67
End: 2022-02-01
Payer: MEDICARE

## 2022-02-01 DIAGNOSIS — G47.33 OSA ON CPAP: ICD-10-CM

## 2022-02-01 DIAGNOSIS — Z12.31 SCREENING MAMMOGRAM, ENCOUNTER FOR: Primary | ICD-10-CM

## 2022-02-01 NOTE — TELEPHONE ENCOUNTER
----- Message from Cassidy Webb sent at 2/1/2022 12:59 PM CST -----  Regarding: Cindi with [facility]  .Type: Patient Call Back    Who called: CONCHIS Puente    What is the request in detail: needs new order and clinical notes for CPAP to [facility]     Can the clinic reply by [facility]?call     Would the patient rather a call back or a response via [facility]? Call    Best call back number: [phone]     Additional Information:  fax [phone]

## 2022-02-02 NOTE — TELEPHONE ENCOUNTER
She will need to repeat her sleep study in order to get new supplies.  She will need to be recalibrated as I do not have any reports for her last sleep study.  Please notify patient that someone will contact her to get scheduled

## 2022-02-08 ENCOUNTER — TELEPHONE (OUTPATIENT)
Dept: FAMILY MEDICINE | Facility: CLINIC | Age: 67
End: 2022-02-08
Payer: MEDICARE

## 2022-02-08 DIAGNOSIS — G47.33 OSA ON CPAP: Primary | ICD-10-CM

## 2022-02-08 NOTE — TELEPHONE ENCOUNTER
----- Message from Yolanda Vasquez sent at 2/8/2022 10:27 AM CST -----  Regarding: [facility]  Name of Who is Calling: Azra with [employer]           What is the request in detail: Azra with [employer]  stated they need order for Cpap machine for pt. She stated you can fax over the information  at [phone]. Azra is requesting a call back           Can the clinic reply by [facility]: no           [facility][phone]

## 2022-03-18 ENCOUNTER — HOSPITAL ENCOUNTER (OUTPATIENT)
Dept: RADIOLOGY | Facility: HOSPITAL | Age: 67
Discharge: HOME | End: 2022-03-18
Attending: FAMILY MEDICINE
Payer: MEDICARE

## 2022-03-18 DIAGNOSIS — Z12.31 SCREENING MAMMOGRAM, ENCOUNTER FOR: ICD-10-CM

## 2022-03-18 PROCEDURE — 77063 MAMMO DIGITAL SCREENING BILAT WITH TOMO: ICD-10-PCS | Mod: 26,,, | Performed by: RADIOLOGY

## 2022-03-18 PROCEDURE — 77067 MAMMO DIGITAL SCREENING BILAT WITH TOMO: ICD-10-PCS | Mod: 26,,, | Performed by: RADIOLOGY

## 2022-03-18 PROCEDURE — 77067 SCR MAMMO BI INCL CAD: CPT | Mod: TC,PO

## 2022-03-18 PROCEDURE — 77067 SCR MAMMO BI INCL CAD: CPT | Mod: 26,,, | Performed by: RADIOLOGY

## 2022-03-18 PROCEDURE — 77063 BREAST TOMOSYNTHESIS BI: CPT | Mod: 26,,, | Performed by: RADIOLOGY

## 2022-03-28 ENCOUNTER — PATIENT OUTREACH (OUTPATIENT)
Dept: ADMINISTRATIVE | Facility: HOSPITAL | Age: 67
End: 2022-03-28
Payer: MEDICARE

## 2022-03-28 DIAGNOSIS — E11.9 TYPE 2 DIABETES MELLITUS WITHOUT COMPLICATION, WITHOUT LONG-TERM CURRENT USE OF INSULIN: Primary | ICD-10-CM

## 2022-03-28 RX ORDER — POLYETHYLENE GLYCOL-3350 AND ELECTROLYTES WITH FLAVOR PACK 240; 5.84; 2.98; 6.72; 22.72 G/278.26G; G/278.26G; G/278.26G; G/278.26G; G/278.26G
POWDER, FOR SOLUTION ORAL
COMMUNITY
Start: 2021-11-22 | End: 2022-04-08

## 2022-04-08 ENCOUNTER — OFFICE VISIT (OUTPATIENT)
Dept: PULMONOLOGY | Facility: CLINIC | Age: 67
End: 2022-04-08
Payer: MEDICARE

## 2022-04-08 VITALS
DIASTOLIC BLOOD PRESSURE: 65 MMHG | HEART RATE: 112 BPM | BODY MASS INDEX: 53.92 KG/M2 | OXYGEN SATURATION: 95 % | HEIGHT: 62 IN | WEIGHT: 293 LBS | SYSTOLIC BLOOD PRESSURE: 180 MMHG

## 2022-04-08 DIAGNOSIS — R06.09 DYSPNEA ON EXERTION: Primary | ICD-10-CM

## 2022-04-08 DIAGNOSIS — G47.33 OSA ON CPAP: ICD-10-CM

## 2022-04-08 PROCEDURE — 3077F PR MOST RECENT SYSTOLIC BLOOD PRESSURE >= 140 MM HG: ICD-10-PCS | Mod: CPTII,S$GLB,, | Performed by: INTERNAL MEDICINE

## 2022-04-08 PROCEDURE — 3008F PR BODY MASS INDEX (BMI) DOCUMENTED: ICD-10-PCS | Mod: CPTII,S$GLB,, | Performed by: INTERNAL MEDICINE

## 2022-04-08 PROCEDURE — 1101F PR PT FALLS ASSESS DOC 0-1 FALLS W/OUT INJ PAST YR: ICD-10-PCS | Mod: CPTII,S$GLB,, | Performed by: INTERNAL MEDICINE

## 2022-04-08 PROCEDURE — 3078F DIAST BP <80 MM HG: CPT | Mod: CPTII,S$GLB,, | Performed by: INTERNAL MEDICINE

## 2022-04-08 PROCEDURE — 99999 PR PBB SHADOW E&M-EST. PATIENT-LVL III: CPT | Mod: PBBFAC,,, | Performed by: INTERNAL MEDICINE

## 2022-04-08 PROCEDURE — 3288F PR FALLS RISK ASSESSMENT DOCUMENTED: ICD-10-PCS | Mod: CPTII,S$GLB,, | Performed by: INTERNAL MEDICINE

## 2022-04-08 PROCEDURE — 3288F FALL RISK ASSESSMENT DOCD: CPT | Mod: CPTII,S$GLB,, | Performed by: INTERNAL MEDICINE

## 2022-04-08 PROCEDURE — 3008F BODY MASS INDEX DOCD: CPT | Mod: CPTII,S$GLB,, | Performed by: INTERNAL MEDICINE

## 2022-04-08 PROCEDURE — 99499 RISK ADDL DX/OHS AUDIT: ICD-10-PCS | Mod: S$GLB,,, | Performed by: INTERNAL MEDICINE

## 2022-04-08 PROCEDURE — 1125F AMNT PAIN NOTED PAIN PRSNT: CPT | Mod: CPTII,S$GLB,, | Performed by: INTERNAL MEDICINE

## 2022-04-08 PROCEDURE — 1159F MED LIST DOCD IN RCRD: CPT | Mod: CPTII,S$GLB,, | Performed by: INTERNAL MEDICINE

## 2022-04-08 PROCEDURE — 1159F PR MEDICATION LIST DOCUMENTED IN MEDICAL RECORD: ICD-10-PCS | Mod: CPTII,S$GLB,, | Performed by: INTERNAL MEDICINE

## 2022-04-08 PROCEDURE — 1125F PR PAIN SEVERITY QUANTIFIED, PAIN PRESENT: ICD-10-PCS | Mod: CPTII,S$GLB,, | Performed by: INTERNAL MEDICINE

## 2022-04-08 PROCEDURE — 99204 PR OFFICE/OUTPT VISIT, NEW, LEVL IV, 45-59 MIN: ICD-10-PCS | Mod: S$GLB,,, | Performed by: INTERNAL MEDICINE

## 2022-04-08 PROCEDURE — 3077F SYST BP >= 140 MM HG: CPT | Mod: CPTII,S$GLB,, | Performed by: INTERNAL MEDICINE

## 2022-04-08 PROCEDURE — 3078F PR MOST RECENT DIASTOLIC BLOOD PRESSURE < 80 MM HG: ICD-10-PCS | Mod: CPTII,S$GLB,, | Performed by: INTERNAL MEDICINE

## 2022-04-08 PROCEDURE — 1101F PT FALLS ASSESS-DOCD LE1/YR: CPT | Mod: CPTII,S$GLB,, | Performed by: INTERNAL MEDICINE

## 2022-04-08 PROCEDURE — 99204 OFFICE O/P NEW MOD 45 MIN: CPT | Mod: S$GLB,,, | Performed by: INTERNAL MEDICINE

## 2022-04-08 PROCEDURE — 99499 UNLISTED E&M SERVICE: CPT | Mod: S$GLB,,, | Performed by: INTERNAL MEDICINE

## 2022-04-08 PROCEDURE — 99999 PR PBB SHADOW E&M-EST. PATIENT-LVL III: ICD-10-PCS | Mod: PBBFAC,,, | Performed by: INTERNAL MEDICINE

## 2022-04-08 NOTE — PROGRESS NOTES
Hilary Wilson  was seen as a new patient at the request of  Ene Boudreaux MD for the evaluation of  rachana.    CHIEF COMPLAINT:    Chief Complaint   Patient presents with    Apnea       HISTORY OF PRESENT ILLNESS: Hilary Wilson is a 67 y.o. female is here for sleep evaluation.   Patient was diagnosed with rachana at [facility].  Unclear of exact year, but patient believed it was around 8022-1885.  Currently with cpap of 15 cm H20 and FFM.  Using cpap nightly.  Main issue is that the on and off is stuck at time.  In addition, patient is unable to get replacement humidifier chamber.    With cpap, patient denied snoring.  Feeling rested upon awake.  No parasomnia.  No cataplexy.  No rls symptoms.      [place] Sleepiness Scale score during initial sleep evaluation was 4.    SLEEP ROUTINE:  Activity the hour prior to sleep: read    Bed partner:    Time to bed:  12 am   Lights off:  Off   Sleep onset latency:  5 minutes         Disruptions or awakenings:    0-1 time for bathroom    Wakeup time:      5:30 am for prayer; go back sleep 7 am till 8:55 am   Perceived sleep quality:  rested       Daytime naps:       minutes afternoon  Weekend sleep routine:      same  Caffeine use: occasional coffee  exercise habit:   rarely      PAST MEDICAL HISTORY:    Active Ambulatory Problems     Diagnosis Date Noted    Essential hypertension 12/09/2015    Type 2 diabetes mellitus without complication, without long-term current use of insulin 12/09/2015    RACHANA on CPAP     BMI 60.0-69.9, adult 06/12/2017    Endometrial hyperplasia with atypia 01/17/2019    Status post hysteroscopy 01/25/2019    Endometrial cancer 02/15/2019    S/P RATLH/BSO 03/19/2019    Dyspnea on exertion 04/08/2022     Resolved Ambulatory Problems     Diagnosis Date Noted    Encounter for screening colonoscopy 11/03/2016     Past Medical History:   Diagnosis Date    Asthma     Diabetes mellitus     Hyperlipidemia     Hypertension     Uterine  cancer     Vaginal delivery                 PAST SURGICAL HISTORY:    Past Surgical History:   Procedure Laterality Date    BREAST BIOPSY Left     COLONOSCOPY N/A 11/3/2016    Procedure: COLONOSCOPY;  Surgeon: Zhang Diez MD;  Location: [facility];  Service: Endoscopy;  Laterality: N/A;    HYSTERECTOMY  03/2019    HYSTEROSCOPY WITH DILATION AND CURETTAGE OF UTERUS N/A 1/25/2019    Procedure: HYSTEROSCOPY, WITH DILATION AND CURETTAGE OF UTERUS;  Surgeon: Elías Nolasco MD;  Location: [facility] OR;  Service: OB/GYN;  Laterality: N/A;  RN PRE OP 1-22-19---BMI--61.3  TUCLEAR MYOSURE TAHIRA PRECHTER [phone] SPOKE TO HER ON 1-23-19 @ 12:23PM    mass removal      OOPHORECTOMY  03/2019    MT REMOVAL OF OVARY/TUBE(S)      ROBOT-ASSISTED LAPAROSCOPIC ABDOMINAL HYSTERECTOMY USING DA MALCOLM XI N/A 3/19/2019    Procedure: XI ROBOTIC HYSTERECTOMY;  Surgeon: Stiven Geronimo MD;  Location: [facility] OR [address] [place] [address];  Service: OB/GYN;  Laterality: N/A;  POSS DIFF INTUBATION-GLIDESCOPE NEEDED  Wt 341 lbs  T & S am of surgery    ROBOT-ASSISTED LAPAROSCOPIC SALPINGO-OOPHORECTOMY USING DA MALCOLM XI Bilateral 3/19/2019    Procedure: XI ROBOTIC SALPINGO-OOPHORECTOMY;  Surgeon: Stiven Geronimo MD;  Location: [facility] OR [address] [place] [address];  Service: OB/GYN;  Laterality: Bilateral;    TUBAL LIGATION           FAMILY HISTORY:                Family History   Problem Relation Age of Onset    Hypertension Mother     Kidney disease Mother     Diabetes Father     Stroke Father     Heart disease Sister 49    Kidney disease Sister     Stroke Sister         in 50s       SOCIAL HISTORY:          Tobacco:   Social History     Tobacco Use   Smoking Status Never Smoker   Smokeless Tobacco Never Used       alcohol use:    Social History     Substance and Sexual Activity   Alcohol Use No                 Occupation:  Retire; former     ALLERGIES:  Review of patient's allergies indicates:  No Known Allergies    CURRENT MEDICATIONS:   "  Current Outpatient Medications   Medication Sig Dispense Refill    albuterol (PROVENTIL/VENTOLIN HFA) 90 mcg/actuation inhaler INHALE 2 PUFFS ORALLY EVERY SIX HOURS AS NEEDED FOR WHEEZING 24 g 2    hydroCHLOROthiazide (HYDRODIURIL) 25 MG tablet TAKE 1 TABLET BY MOUTH EVERY DAY 90 tablet 3    ibuprofen (ADVIL,MOTRIN) 800 MG tablet Take 1 tablet (800 mg total) by mouth every 8 (eight) hours as needed for Pain. 40 tablet 0    diclofenac sodium (VOLTAREN) 1 % Gel Apply 2 g topically 3 (three) times daily. (Patient not taking: Reported on 4/8/2022) 100 g 0    GAVILYTE-C 240-22.72-6.72 -5.84 gram SolR Take by mouth.       No current facility-administered medications for this visit.                  REVIEW OF SYSTEMS:     Sleep related symptoms as per HPI.  CONST:Denies weight gain    HEENT: + sinus congestion  PULM: Denies dyspnea with adl  CARD:  Denies palpitations   GI:  Denies acid reflux  : Denies polyuria  NEURO: Denies headaches  PSYCH: Denies mood disturbance  HEME: Denies anemia   Otherwise, a balance of systems reviewed is negative.          PHYSICAL EXAM:  Vitals:    04/08/22 0928   BP: (!) 180/65   Pulse: (!) 112   SpO2: 95%   Weight: (!) 161.3 kg (355 lb 9.6 oz)   Height: 5' 2" (1.575 m)   PainSc:   4   PainLoc: Knee     Body mass index is 65.04 kg/m².     GENERAL: Normal development, well groomed  HEENT:  Conjunctivae are non-erythematous; Pupils equal, round, and reactive to light; Nose is symmetrical; Nasal mucosa is pink and moist; Septum is midline; Inferior turbinates are normal; Nasal airflow is normal; Posterior pharynx is pink; Modified Mallampati: 4; Posterior palate is normal; Tonsils +1; Uvula is normal and pink;Tongue is normal; Dentition is fair; No TMJ tenderness; Jaw opening and protrusion without click and without discomfort.  NECK: Supple. Neck circumference is 20 inches. No thyromegaly. No palpable nodes.     SKIN: On face and neck: No abrasions, no rashes, no lesions.  No " subcutaneous nodules are palpable.  RESPIRATORY: Chest is clear to auscultation.  Normal chest expansion and non-labored breathing at rest.  CARDIOVASCULAR: Normal S1, S2.  No murmurs, gallops or rubs. No carotid bruits bilaterally.  EXTREMITIES: + edema. No clubbing. No cyanosis. Station normal. Gait normal.        NEURO/PSYCH: Oriented to time, place and person. Normal attention span and concentration. Affect is full. Mood is normal.                                              DATA no prior sleep study     cxr 3/12/19 (personally reviewed) no consolidation or effusion    Lab Results   Component Value Date    TSH 0.752 05/11/2021     ASSESSMENT/PLAN  Problem List Items Addressed This Visit     BMI 60.0-69.9, adult    Overview     -aware of need for drastic weight loss.  Endorse poor eating habits.  -follow up with pcp.             Dyspnea on exertion - Primary    Overview     -limited mobility may relate to habitus and arthritis  -diagnosed with asthma around 2014.  Prn albuterol with adequate control.    -baseline echo and pft.   -cxr in 2019 without evidence of parenchymal lung disease.             Relevant Orders    Complete PFT with bronchodilator    Echo    JOSUÉ on CPAP    Overview     -interrogation of cpap confirmed pressure of 15 cm H20  -machine is 15 years old  -unable to retrieve record of sleep study.  Will bring back for requalification.             Relevant Orders    Polysomnogram (CPAP will be added if patient meets diagnostic criteria.)          Diagnostic: Polysomnogram. The nature of this procedure and its indication was discussed with the patient.     Education: During our discussion today, we talked about the etiology of obstructive sleep apnea as well as the potential ramifications of untreated sleep apnea, which could include daytime sleepiness, hypertension, heart disease and/or stroke.     Precautions: The patient was advised to abstain from driving should they feel sleepy or drowsy.        Thank you for allowing me the opportunity to participate in the care of your patient.    Patient will No follow-ups on file. with md/np.    Please cc note to  Ene Boudreaux MD.    45 minutes of total time spent on the encounter, which includes face to face time and non-face to face time preparing to see the patient (eg, review of tests), Obtaining and/or reviewing separately obtained history, documenting clinical information in the electronic or other health record, independently interpreting results (not separately reported) and communicating results to the patient/family/caregiver, or Care coordination (not separately reported).

## 2022-04-11 ENCOUNTER — OFFICE VISIT (OUTPATIENT)
Dept: GYNECOLOGIC ONCOLOGY | Facility: CLINIC | Age: 67
End: 2022-04-11
Payer: MEDICARE

## 2022-04-11 VITALS — WEIGHT: 293 LBS | BODY MASS INDEX: 53.92 KG/M2 | HEIGHT: 62 IN

## 2022-04-11 DIAGNOSIS — E11.9 TYPE 2 DIABETES MELLITUS WITHOUT COMPLICATION, WITHOUT LONG-TERM CURRENT USE OF INSULIN: ICD-10-CM

## 2022-04-11 DIAGNOSIS — C54.1 ENDOMETRIAL CANCER: Primary | ICD-10-CM

## 2022-04-11 PROCEDURE — 1159F PR MEDICATION LIST DOCUMENTED IN MEDICAL RECORD: ICD-10-PCS | Mod: CPTII,S$GLB,, | Performed by: OBSTETRICS & GYNECOLOGY

## 2022-04-11 PROCEDURE — 1126F AMNT PAIN NOTED NONE PRSNT: CPT | Mod: CPTII,S$GLB,, | Performed by: OBSTETRICS & GYNECOLOGY

## 2022-04-11 PROCEDURE — 99214 PR OFFICE/OUTPT VISIT, EST, LEVL IV, 30-39 MIN: ICD-10-PCS | Mod: S$GLB,,, | Performed by: OBSTETRICS & GYNECOLOGY

## 2022-04-11 PROCEDURE — 3288F PR FALLS RISK ASSESSMENT DOCUMENTED: ICD-10-PCS | Mod: CPTII,S$GLB,, | Performed by: OBSTETRICS & GYNECOLOGY

## 2022-04-11 PROCEDURE — 1126F PR PAIN SEVERITY QUANTIFIED, NO PAIN PRESENT: ICD-10-PCS | Mod: CPTII,S$GLB,, | Performed by: OBSTETRICS & GYNECOLOGY

## 2022-04-11 PROCEDURE — 99499 RISK ADDL DX/OHS AUDIT: ICD-10-PCS | Mod: S$GLB,,, | Performed by: OBSTETRICS & GYNECOLOGY

## 2022-04-11 PROCEDURE — 99999 PR PBB SHADOW E&M-EST. PATIENT-LVL III: CPT | Mod: PBBFAC,,, | Performed by: OBSTETRICS & GYNECOLOGY

## 2022-04-11 PROCEDURE — 1160F RVW MEDS BY RX/DR IN RCRD: CPT | Mod: CPTII,S$GLB,, | Performed by: OBSTETRICS & GYNECOLOGY

## 2022-04-11 PROCEDURE — 99999 PR PBB SHADOW E&M-EST. PATIENT-LVL III: ICD-10-PCS | Mod: PBBFAC,,, | Performed by: OBSTETRICS & GYNECOLOGY

## 2022-04-11 PROCEDURE — 1101F PT FALLS ASSESS-DOCD LE1/YR: CPT | Mod: CPTII,S$GLB,, | Performed by: OBSTETRICS & GYNECOLOGY

## 2022-04-11 PROCEDURE — 3008F PR BODY MASS INDEX (BMI) DOCUMENTED: ICD-10-PCS | Mod: CPTII,S$GLB,, | Performed by: OBSTETRICS & GYNECOLOGY

## 2022-04-11 PROCEDURE — 99214 OFFICE O/P EST MOD 30 MIN: CPT | Mod: S$GLB,,, | Performed by: OBSTETRICS & GYNECOLOGY

## 2022-04-11 PROCEDURE — 1159F MED LIST DOCD IN RCRD: CPT | Mod: CPTII,S$GLB,, | Performed by: OBSTETRICS & GYNECOLOGY

## 2022-04-11 PROCEDURE — 99499 UNLISTED E&M SERVICE: CPT | Mod: S$GLB,,, | Performed by: OBSTETRICS & GYNECOLOGY

## 2022-04-11 PROCEDURE — 3008F BODY MASS INDEX DOCD: CPT | Mod: CPTII,S$GLB,, | Performed by: OBSTETRICS & GYNECOLOGY

## 2022-04-11 PROCEDURE — 1101F PR PT FALLS ASSESS DOC 0-1 FALLS W/OUT INJ PAST YR: ICD-10-PCS | Mod: CPTII,S$GLB,, | Performed by: OBSTETRICS & GYNECOLOGY

## 2022-04-11 PROCEDURE — 1160F PR REVIEW ALL MEDS BY PRESCRIBER/CLIN PHARMACIST DOCUMENTED: ICD-10-PCS | Mod: CPTII,S$GLB,, | Performed by: OBSTETRICS & GYNECOLOGY

## 2022-04-11 PROCEDURE — 3288F FALL RISK ASSESSMENT DOCD: CPT | Mod: CPTII,S$GLB,, | Performed by: OBSTETRICS & GYNECOLOGY

## 2022-04-11 NOTE — PROGRESS NOTES
"Subjective:      Patient ID: Hilary Wilson is a 67 y.o. female.    Chief Complaint: Follow-up    Treatment History  Clinical Stage IAG2 endometrial cancer (mainly in polyp, 3mm of 27 mm invasion)  RALH/BSO/Minilap for uterine extraction  Morbid obesity precluding staging    Follow-up  Pertinent negatives include no abdominal pain, arthralgias, chest pain, chills, coughing, fatigue, fever, nausea, numbness, rash, sore throat, vomiting or weakness.     Here today for continued surveillance. Last seen Feb, 2020. Reluctant to return due to pandemic. Denies VB, F/C, N/V.  No new symptoms per patient.    Review of Systems   Constitutional: Negative for activity change, appetite change, chills, fatigue and fever.   HENT: Negative for hearing loss, mouth sores, nosebleeds, sore throat and tinnitus.    Eyes: Negative for visual disturbance.   Respiratory: Negative for cough, chest tightness, shortness of breath and wheezing.    Cardiovascular: Negative for chest pain and leg swelling.   Gastrointestinal: Negative for abdominal distention, abdominal pain, blood in stool, constipation, diarrhea, nausea and vomiting.   Genitourinary: Negative for dysuria, flank pain, frequency, hematuria, pelvic pain, vaginal bleeding, vaginal discharge and vaginal pain.   Musculoskeletal: Negative for arthralgias and back pain.   Skin: Negative for rash.   Neurological: Negative for dizziness, seizures, syncope, weakness and numbness.   Hematological: Does not bruise/bleed easily.   Psychiatric/Behavioral: Negative for confusion and sleep disturbance. The patient is not nervous/anxious.      Ht 5' 2" (1.575 m)   Wt (!) 158.8 kg (350 lb)   LMP 05/23/2014 Comment: having bleeding  DUB  BMI 64.02 kg/m²     Objective:   Physical Exam:   Constitutional: She is oriented to person, place, and time. She appears well-developed and well-nourished. No distress.    HENT:   Head: Normocephalic and atraumatic.    Eyes: No scleral icterus.   "   Cardiovascular: Exam reveals no cyanosis and no edema.     Pulmonary/Chest: Effort normal. No respiratory distress. She exhibits no tenderness.        Abdominal: Soft. She exhibits no distension, no fluid wave and no mass. There is no abdominal tenderness. There is no rebound and no guarding. No hernia.     Genitourinary:    Vagina normal.      Pelvic exam was performed with patient supine.   There is no rash, tenderness or lesion on the right labia. There is no rash, tenderness or lesion on the left labia. Right adnexum displays no mass, no tenderness and no fullness. Left adnexum displays no mass, no tenderness and no fullness. Vaginal cuff normal.  No  no vaginal discharge, bleeding (cuff without lesion) or unspecified prolapse of vaginal walls in the vagina. Cervix is absent.Uterus is absent.           Musculoskeletal: Moves all extremeties. No edema.      Lymphadenopathy:     She has no cervical adenopathy.    Neurological: She is alert and oriented to person, place, and time.    Skin: Skin is warm and dry. No cyanosis. No pallor.    Psychiatric: She has a normal mood and affect. Her behavior is normal.       Assessment:     1. Endometrial cancer    2. Type 2 diabetes mellitus without complication, without long-term current use of insulin    3. BMI 60.0-69.9, adult        Plan:       BRITTNEY on exam today. Will continue surveillance at 6 month intervals now.   RTC 6 months

## 2022-04-12 ENCOUNTER — TELEPHONE (OUTPATIENT)
Dept: SLEEP MEDICINE | Facility: OTHER | Age: 67
End: 2022-04-12
Payer: MEDICARE

## 2022-04-20 ENCOUNTER — HOSPITAL ENCOUNTER (OUTPATIENT)
Dept: CARDIOLOGY | Facility: HOSPITAL | Age: 67
Discharge: HOME | End: 2022-04-20
Attending: INTERNAL MEDICINE
Payer: MEDICARE

## 2022-04-20 ENCOUNTER — HOSPITAL ENCOUNTER (OUTPATIENT)
Dept: RESPIRATORY THERAPY | Facility: HOSPITAL | Age: 67
Discharge: HOME | End: 2022-04-20
Attending: INTERNAL MEDICINE
Payer: MEDICARE

## 2022-04-20 VITALS — RESPIRATION RATE: 18 BRPM | OXYGEN SATURATION: 97 % | HEART RATE: 95 BPM

## 2022-04-20 VITALS — WEIGHT: 293 LBS | HEIGHT: 62 IN | BODY MASS INDEX: 53.92 KG/M2

## 2022-04-20 DIAGNOSIS — R06.09 DYSPNEA ON EXERTION: ICD-10-CM

## 2022-04-20 LAB
AORTIC ROOT ANNULUS: 3.28 CM
AORTIC VALVE CUSP SEPERATION: 2.23 CM
ASCENDING AORTA: 3.14 CM
AV INDEX (PROSTH): 0.76
AV MEAN GRADIENT: 6 MMHG
AV PEAK GRADIENT: 8 MMHG
AV VALVE AREA: 2.94 CM2
AV VELOCITY RATIO: 0.74
BRPFT: NORMAL
BSA FOR ECHO PROCEDURE: 2.64 M2
CV ECHO LV RWT: 0.66 CM
DLCO ADJ PRE: 19.82 ML/(MIN*MMHG)
DLCO SINGLE BREATH LLN: 14.63
DLCO SINGLE BREATH PRE REF: 97.3 %
DLCO SINGLE BREATH REF: 20.36
DLCOC SBVA LLN: 2.87
DLCOC SBVA PRE REF: 142.4 %
DLCOC SBVA REF: 4.45
DLCOC SINGLE BREATH LLN: 14.63
DLCOC SINGLE BREATH PRE REF: 97.3 %
DLCOC SINGLE BREATH REF: 20.36
DLCOVA LLN: 2.87
DLCOVA PRE REF: 142.4 %
DLCOVA PRE: 6.34 ML/(MIN*MMHG*L)
DLCOVA REF: 4.45
DLVAADJ PRE: 6.34 ML/(MIN*MMHG*L)
DOP CALC AO PEAK VEL: 1.44 M/S
DOP CALC AO VTI: 28.88 CM
DOP CALC LVOT AREA: 3.9 CM2
DOP CALC LVOT DIAMETER: 2.22 CM
DOP CALC LVOT PEAK VEL: 1.07 M/S
DOP CALC LVOT STROKE VOLUME: 84.84 CM3
DOP CALCLVOT PEAK VEL VTI: 21.93 CM
E WAVE DECELERATION TIME: 150.92 MSEC
E/A RATIO: 0.63
E/E' RATIO: 9.33 M/S
ECHO LV POSTERIOR WALL: 1.35 CM (ref 0.6–1.1)
EJECTION FRACTION: 65 %
ERVN2 LLN: -16449.33
ERVN2 PRE REF: 40.3 %
ERVN2 PRE: 0.27 L
ERVN2 REF: 0.67
FEF 25 75 CHG: 104.2 %
FEF 25 75 LLN: 0.66
FEF 25 75 POST REF: 165.2 %
FEF 25 75 PRE REF: 80.9 %
FEF 25 75 REF: 1.67
FET100 CHG: -9.2 %
FEV1 CHG: 16.2 %
FEV1 FVC CHG: 25 %
FEV1 FVC LLN: 67
FEV1 FVC POST REF: 113.9 %
FEV1 FVC PRE REF: 91.1 %
FEV1 FVC REF: 79
FEV1 LLN: 1.3
FEV1 POST REF: 96.7 %
FEV1 PRE REF: 83.2 %
FEV1 REF: 1.84
FRACTIONAL SHORTENING: 38 % (ref 28–44)
FRCN2 LLN: 1.76
FRCN2 PRE REF: 58.8 %
FRCN2 REF: 2.58
FVC CHG: -7.1 %
FVC LLN: 1.67
FVC POST REF: 84.5 %
FVC PRE REF: 91 %
FVC REF: 2.33
INTERVENTRICULAR SEPTUM: 1.36 CM (ref 0.6–1.1)
IVC PRE: 2.02 L
IVC SINGLE BREATH LLN: 1.67
IVC SINGLE BREATH PRE REF: 86.6 %
IVC SINGLE BREATH REF: 2.33
IVRT: 102.76 MSEC
LA MAJOR: 4.92 CM
LA MINOR: 4.76 CM
LA WIDTH: 3.86 CM
LEFT ATRIUM SIZE: 3.16 CM
LEFT ATRIUM VOLUME INDEX: 20.7 ML/M2
LEFT ATRIUM VOLUME: 50.17 CM3
LEFT INTERNAL DIMENSION IN SYSTOLE: 2.54 CM (ref 2.1–4)
LEFT VENTRICLE DIASTOLIC VOLUME INDEX: 31.08 ML/M2
LEFT VENTRICLE DIASTOLIC VOLUME: 75.22 ML
LEFT VENTRICLE MASS INDEX: 86 G/M2
LEFT VENTRICLE SYSTOLIC VOLUME INDEX: 9.6 ML/M2
LEFT VENTRICLE SYSTOLIC VOLUME: 23.21 ML
LEFT VENTRICULAR INTERNAL DIMENSION IN DIASTOLE: 4.12 CM (ref 3.5–6)
LEFT VENTRICULAR MASS: 207.5 G
LV LATERAL E/E' RATIO: 10 M/S
LV SEPTAL E/E' RATIO: 8.75 M/S
MV PEAK A VEL: 1.11 M/S
MV PEAK E VEL: 0.7 M/S
MV STENOSIS PRESSURE HALF TIME: 43.77 MS
MV VALVE AREA P 1/2 METHOD: 5.03 CM2
PEF CHG: 51.2 %
PEF LLN: 2.87
PEF POST REF: 73.3 %
PEF PRE REF: 48.5 %
PEF REF: 4.74
PISA TR MAX VEL: 1.9 M/S
POST FEF 25 75: 2.75 L/S
POST FET 100: 7.46 SEC
POST FEV1 FVC: 90.2 %
POST FEV1: 1.78 L
POST FVC: 1.97 L
POST PEF: 3.48 L/S
PRE DLCO: 19.82 ML/(MIN*MMHG)
PRE FEF 25 75: 1.35 L/S
PRE FET 100: 8.22 SEC
PRE FEV1 FVC: 72.16 %
PRE FEV1: 1.53 L
PRE FRC N2: 1.52 L
PRE FVC: 2.12 L
PRE PEF: 2.3 L/S
PV PEAK VELOCITY: 0.88 CM/S
RA MAJOR: 5.14 CM
RA PRESSURE: 3 MMHG
RA WIDTH: 4.19 CM
RIGHT VENTRICULAR END-DIASTOLIC DIMENSION: 3.89 CM
RV TISSUE DOPPLER FREE WALL SYSTOLIC VELOCITY 1 (APICAL 4 CHAMBER VIEW): 17.08 CM/S
RVN2 LLN: 1.34
RVN2 PRE REF: 65.3 %
RVN2 PRE: 1.25 L
RVN2 REF: 1.91
RVN2TLCN2 LLN: 32.15
RVN2TLCN2 PRE REF: 88.6 %
RVN2TLCN2 PRE: 36.98 %
RVN2TLCN2 REF: 41.74
STJ: 2.82 CM
TDI LATERAL: 0.07 M/S
TDI SEPTAL: 0.08 M/S
TDI: 0.08 M/S
TLCN2 LLN: 3.58
TLCN2 PRE REF: 73.9 %
TLCN2 PRE: 3.38 L
TLCN2 REF: 4.57
TR MAX PG: 14 MMHG
TRICUSPID ANNULAR PLANE SYSTOLIC EXCURSION: 1.82 CM
TV REST PULMONARY ARTERY PRESSURE: 17 MMHG
VA PRE: 3.12 L
VA SINGLE BREATH LLN: 4.42
VA SINGLE BREATH PRE REF: 70.6 %
VA SINGLE BREATH REF: 4.42
VCMAXN2 LLN: 1.67
VCMAXN2 PRE REF: 91.4 %
VCMAXN2 PRE: 2.13 L
VCMAXN2 REF: 2.33

## 2022-04-20 PROCEDURE — 94010 BREATHING CAPACITY TEST: CPT

## 2022-04-20 PROCEDURE — 94060 PR EVAL OF BRONCHOSPASM: ICD-10-PCS | Mod: 26,,, | Performed by: INTERNAL MEDICINE

## 2022-04-20 PROCEDURE — 94727 PR PULM FUNCTION TEST BY GAS: ICD-10-PCS | Mod: 26,,, | Performed by: INTERNAL MEDICINE

## 2022-04-20 PROCEDURE — 25000242 PHARM REV CODE 250 ALT 637 W/ HCPCS: Performed by: INTERNAL MEDICINE

## 2022-04-20 PROCEDURE — 94729 DIFFUSING CAPACITY: CPT

## 2022-04-20 PROCEDURE — 93306 ECHO (CUPID ONLY): ICD-10-PCS | Mod: 26,,, | Performed by: INTERNAL MEDICINE

## 2022-04-20 PROCEDURE — 93306 TTE W/DOPPLER COMPLETE: CPT

## 2022-04-20 PROCEDURE — 94729 PR C02/MEMBANE DIFFUSE CAPACITY: ICD-10-PCS | Mod: 26,,, | Performed by: INTERNAL MEDICINE

## 2022-04-20 PROCEDURE — 94729 DIFFUSING CAPACITY: CPT | Mod: 26,,, | Performed by: INTERNAL MEDICINE

## 2022-04-20 PROCEDURE — 94727 GAS DIL/WSHOT DETER LNG VOL: CPT | Mod: 26,,, | Performed by: INTERNAL MEDICINE

## 2022-04-20 PROCEDURE — 94060 EVALUATION OF WHEEZING: CPT | Mod: 26,,, | Performed by: INTERNAL MEDICINE

## 2022-04-20 PROCEDURE — 93306 TTE W/DOPPLER COMPLETE: CPT | Mod: 26,,, | Performed by: INTERNAL MEDICINE

## 2022-04-20 PROCEDURE — 94727 GAS DIL/WSHOT DETER LNG VOL: CPT

## 2022-04-20 RX ORDER — ALBUTEROL SULFATE 2.5 MG/.5ML
2.5 SOLUTION RESPIRATORY (INHALATION) ONCE
Status: COMPLETED | OUTPATIENT
Start: 2022-04-20 | End: 2022-04-20

## 2022-04-20 RX ADMIN — ALBUTEROL SULFATE 2.5 MG: 2.5 SOLUTION RESPIRATORY (INHALATION) at 01:04

## 2022-04-21 ENCOUNTER — HOSPITAL ENCOUNTER (OUTPATIENT)
Dept: SLEEP MEDICINE | Facility: HOSPITAL | Age: 67
Discharge: HOME | End: 2022-04-21
Attending: INTERNAL MEDICINE
Payer: MEDICARE

## 2022-04-21 ENCOUNTER — TELEPHONE (OUTPATIENT)
Dept: SLEEP MEDICINE | Facility: HOSPITAL | Age: 67
End: 2022-04-21
Payer: MEDICARE

## 2022-04-21 DIAGNOSIS — G47.33 OSA ON CPAP: ICD-10-CM

## 2022-04-21 PROCEDURE — 95810 PR POLYSOMNOGRAPHY, 4 OR MORE: ICD-10-PCS | Mod: 26,,, | Performed by: INTERNAL MEDICINE

## 2022-04-21 PROCEDURE — 95810 POLYSOM 6/> YRS 4/> PARAM: CPT

## 2022-04-21 PROCEDURE — 95810 POLYSOM 6/> YRS 4/> PARAM: CPT | Mod: 26,,, | Performed by: INTERNAL MEDICINE

## 2022-04-21 NOTE — TELEPHONE ENCOUNTER
Active respiratory infection: No  Fever: No  Head lice: No  Bedbugs: No   Needs wheelchair: No  Needs Oxygen: No

## 2022-04-22 NOTE — PATIENT INSTRUCTIONS
Your sleep study will be scored and interpreted by one of our physicians who are board certified in sleep medicine.  Within two weeks the results will be sent to the physician who referred you. Your physician should then contact you to go over the results, along with any recommendations. If you do not hear from your physician within two weeks, please call them.

## 2022-04-22 NOTE — PROGRESS NOTES
A PSG study was performed on Hilary Wilson. The entire procedure was explained, including Bio calibration procedure, patient was informed that there may be a need to enter the room during the night to fix lead or make adjustments to the equipment. Questions were answered prior to the start of the study. She was given instructions on how to call out for help including how to use the nurse call unit in the bathroom. Patient was given [employer] phone number to call if patient needed tech for anything, in case tech was out of tech room.  Patient education was performed. This includes the possible use of CPAP machine and different CPAP masks. She was given the after visit summary.   The lowest oxygen saturation observed during sleep was 86%   She did not meet criteria for a split night study due to insufficient amount of events during the 1st part of the study  The EKG appeared to be NSR  Moderate to loud snoring was observed   Technical difficulties during the study: M1 had to be fixed and wire changed out.

## 2022-05-03 ENCOUNTER — TELEPHONE (OUTPATIENT)
Dept: PULMONOLOGY | Facility: CLINIC | Age: 67
End: 2022-05-03
Payer: MEDICARE

## 2022-05-03 NOTE — TELEPHONE ENCOUNTER
Spoke with patient scheduled an appointment to see provider.    RAOUL Yeboah              ----- Message from Zia Magallanes MD sent at 5/2/2022 10:09 PM CDT -----  Please schedule sleep clinic appointmetnt with md/np in 1-2 weeks to discuss sleep study result.  Please notify me if we are not able to get patient schedule within 2 weeks.

## 2022-05-03 NOTE — PROCEDURES
"Dear Provider,     You have ordered sleep LAB services to perform the sleep study for Hilary Wilson.  The sleep study that you ordered is complete.      Please find Sleep Study result in "Chart Review" under the "Media tab."      As the ordering provider, you are responsible for reviewing the results and implementing a treatment plan with your patient.    If you need a Sleep Medicine provider to explain the sleep study findings and arrange treatment for the patient, please refer patient for consultation to our Sleep Clinic via [facility] with Ambulatory Consult Sleep.    To do that please place an order for an  "Ambulatory Consult Sleep" - it will go to our clinic work queue for our Medical Assistant to contact the patient for an appointment.     For any questions, please contact our clinic staff at [phone] to talk to clinical staff.   "

## 2022-05-18 DIAGNOSIS — E11.9 TYPE 2 DIABETES MELLITUS WITHOUT COMPLICATION, WITHOUT LONG-TERM CURRENT USE OF INSULIN: ICD-10-CM

## 2022-05-25 ENCOUNTER — OFFICE VISIT (OUTPATIENT)
Dept: PULMONOLOGY | Facility: CLINIC | Age: 67
End: 2022-05-25
Payer: MEDICARE

## 2022-05-25 DIAGNOSIS — G47.33 OSA ON CPAP: ICD-10-CM

## 2022-05-25 DIAGNOSIS — G47.33 OSA (OBSTRUCTIVE SLEEP APNEA): Primary | ICD-10-CM

## 2022-05-25 DIAGNOSIS — R06.09 DYSPNEA ON EXERTION: ICD-10-CM

## 2022-05-25 PROCEDURE — 99214 OFFICE O/P EST MOD 30 MIN: CPT | Mod: 95,,, | Performed by: INTERNAL MEDICINE

## 2022-05-25 PROCEDURE — 1160F PR REVIEW ALL MEDS BY PRESCRIBER/CLIN PHARMACIST DOCUMENTED: ICD-10-PCS | Mod: CPTII,95,, | Performed by: INTERNAL MEDICINE

## 2022-05-25 PROCEDURE — 1159F PR MEDICATION LIST DOCUMENTED IN MEDICAL RECORD: ICD-10-PCS | Mod: CPTII,95,, | Performed by: INTERNAL MEDICINE

## 2022-05-25 PROCEDURE — 99214 PR OFFICE/OUTPT VISIT, EST, LEVL IV, 30-39 MIN: ICD-10-PCS | Mod: 95,,, | Performed by: INTERNAL MEDICINE

## 2022-05-25 PROCEDURE — 1160F RVW MEDS BY RX/DR IN RCRD: CPT | Mod: CPTII,95,, | Performed by: INTERNAL MEDICINE

## 2022-05-25 PROCEDURE — 1159F MED LIST DOCD IN RCRD: CPT | Mod: CPTII,95,, | Performed by: INTERNAL MEDICINE

## 2022-05-25 NOTE — PROGRESS NOTES
Hilary Wilson  was seen as a follow up.  The patient location is: home  The chief complaint leading to consultation is: rachana    Visit type: audiovisual.  Patient was unable to log in via Epic.  We used Duo application    Face to Face time with patient: 25 minutes of total time spent on the encounter, which includes face to face time and non-face to face time preparing to see the patient (eg, review of tests), Obtaining and/or reviewing separately obtained history, Documenting clinical information in the electronic or other health record, Independently interpreting results (not separately reported) and communicating results to the patient/family/caregiver, or Care coordination (not separately reported).         Each patient to whom he or she provides medical services by telemedicine is:  (1) informed of the relationship between the physician and patient and the respective role of any other health care provider with respect to management of the patient; and (2) notified that he or she may decline to receive medical services by telemedicine and may withdraw from such care at any time.    Notes:     CHIEF COMPLAINT:    No chief complaint on file.      HISTORY OF PRESENT ILLNESS: Hilary Wilson is a 67 y.o. female is here for sleep evaluation.   Our first encounter was 4/8/22.   Patient was diagnosed with rachana at [facility].  Unclear of exact year, but patient believed it was around 9524-7648.  Currently with cpap of 15 cm H20 and FFM.  Using cpap nightly.  Main issue is that the on and off is stuck at time.  In addition, patient is unable to get replacement humidifier chamber.    With cpap, patient denied snoring.  Feeling rested upon awake.  No parasomnia.  No cataplexy.  No rls symptoms.      Dyspnea improved when compared initial visit.   No new issue since last visit.      [place] Sleepiness Scale score during initial sleep evaluation was 4.    SLEEP ROUTINE:  Activity the hour prior to sleep: read    Bed partner:     Time to bed:  12 am   Lights off:  Off   Sleep onset latency:  5 minutes         Disruptions or awakenings:    0-1 time for bathroom    Wakeup time:      5:30 am for prayer; go back sleep 7 am till 8:55 am   Perceived sleep quality:  rested       Daytime naps:       minutes afternoon  Weekend sleep routine:      same  Caffeine use: occasional coffee  exercise habit:   rarely      PAST MEDICAL HISTORY:    Active Ambulatory Problems     Diagnosis Date Noted    Essential hypertension 12/09/2015    Type 2 diabetes mellitus without complication, without long-term current use of insulin 12/09/2015    JOSUÉ on CPAP     BMI 60.0-69.9, adult 06/12/2017    Endometrial hyperplasia with atypia 01/17/2019    Status post hysteroscopy 01/25/2019    Endometrial cancer 02/15/2019    S/P RATLH/BSO 03/19/2019    Dyspnea on exertion 04/08/2022     Resolved Ambulatory Problems     Diagnosis Date Noted    Encounter for screening colonoscopy 11/03/2016     Past Medical History:   Diagnosis Date    Asthma     Diabetes mellitus     Hyperlipidemia     Hypertension     Uterine cancer     Vaginal delivery                 PAST SURGICAL HISTORY:    Past Surgical History:   Procedure Laterality Date    BREAST BIOPSY Left     COLONOSCOPY N/A 11/3/2016    Procedure: COLONOSCOPY;  Surgeon: Zhang Diez MD;  Location: [facility] ENDO;  Service: Endoscopy;  Laterality: N/A;    HYSTERECTOMY  03/2019    HYSTEROSCOPY WITH DILATION AND CURETTAGE OF UTERUS N/A 1/25/2019    Procedure: HYSTEROSCOPY, WITH DILATION AND CURETTAGE OF UTERUS;  Surgeon: Elías Nolasco MD;  Location: [facility] OR;  Service: OB/GYN;  Laterality: N/A;  RN PRE OP 1-22-19---BMI--61.3  TUCLEAR MYOSURE TAHIRA PRECHTER [phone] SPOKE TO HER ON 1-23-19 @ 12:23PM    mass removal      OOPHORECTOMY  03/2019    FL REMOVAL OF OVARY/TUBE(S)      ROBOT-ASSISTED LAPAROSCOPIC ABDOMINAL HYSTERECTOMY USING DA MALCOLM XI N/A 3/19/2019    Procedure: XI ROBOTIC  HYSTERECTOMY;  Surgeon: Stiven Geronimo MD;  Location: [facility] OR 2ND FLR;  Service: OB/GYN;  Laterality: N/A;  POSS DIFF INTUBATION-GLIDESCOPE NEEDED  Wt 341 lbs  T & S am of surgery    ROBOT-ASSISTED LAPAROSCOPIC SALPINGO-OOPHORECTOMY USING DA MALCOLM XI Bilateral 3/19/2019    Procedure: XI ROBOTIC SALPINGO-OOPHORECTOMY;  Surgeon: Stiven Geronimo MD;  Location: [facility] OR 2ND FLR;  Service: OB/GYN;  Laterality: Bilateral;    TUBAL LIGATION           FAMILY HISTORY:                Family History   Problem Relation Age of Onset    Hypertension Mother     Kidney disease Mother     Diabetes Father     Stroke Father     Heart disease Sister 49    Kidney disease Sister     Stroke Sister         in 50s       SOCIAL HISTORY:          Tobacco:   Social History     Tobacco Use   Smoking Status Never Smoker   Smokeless Tobacco Never Used       alcohol use:    Social History     Substance and Sexual Activity   Alcohol Use No                 Occupation:  Retire; former     ALLERGIES:  Review of patient's allergies indicates:  No Known Allergies    CURRENT MEDICATIONS:    Current Outpatient Medications   Medication Sig Dispense Refill    albuterol (PROVENTIL/VENTOLIN HFA) 90 mcg/actuation inhaler INHALE 2 PUFFS ORALLY EVERY SIX HOURS AS NEEDED FOR WHEEZING 24 g 2    hydroCHLOROthiazide (HYDRODIURIL) 25 MG tablet TAKE 1 TABLET BY MOUTH EVERY DAY 90 tablet 3    ibuprofen (ADVIL,MOTRIN) 800 MG tablet Take 1 tablet (800 mg total) by mouth every 8 (eight) hours as needed for Pain. 40 tablet 0     No current facility-administered medications for this visit.                  REVIEW OF SYSTEMS:     Sleep related symptoms as per HPI.  CONST:Denies weight gain    HEENT: + sinus congestion  PULM: Denies dyspnea with adl  CARD:  Denies palpitations   GI:  Denies acid reflux  : Denies polyuria  NEURO: Denies headaches  PSYCH: Denies mood disturbance  HEME: Denies anemia   Otherwise, a balance of systems reviewed is  negative.          PHYSICAL EXAM:  There were no vitals filed for this visit.  There is no height or weight on file to calculate BMI.     GENERAL: Normal development, well groomed.  No apparent distress.    HEENT:   extra oculomotor is intact  NECK: N/A.  SKIN: On face and neck: No abrasions, no rashes, no lesions.    RESPIRATORY:   Normal chest expansion and non-labored breathing at rest.  CARDIOVASCULAR: n/a    EXTREMITIES: n/a  NEURO/PSYCH: Oriented to time, place and person. Normal attention span and concentration. Affect is full. Mood is normal.                                               DATA   psg 4/21/22 ahi of 49    PFT 4/20/22 Ratio of 72%; FVC 2.12 L (91%); FEV1 1.53 L (83%); TLC 3.38 L (74%); dlco 19.98 (97%)     cxr 3/12/19 (personally reviewed) no consolidation or effusion    Echo 4/8/22  · The left ventricle is normal in size with concentric hypertrophy and normal systolic function.  · The estimated ejection fraction is 65%.  · Normal left ventricular diastolic function.  · Normal right ventricular size with normal right ventricular systolic function.  · Normal central venous pressure (3 mmHg).  · The estimated PA systolic pressure is 17 mmHg.         Lab Results   Component Value Date    TSH 0.752 05/11/2021     ASSESSMENT/PLAN  Problem List Items Addressed This Visit     Dyspnea on exertion    Overview     -limited mobility may relate to habitus and arthritis  -diagnosed with asthma around 2014.  Prn albuterol with adequate control.    -baseline echo and pft.   -cxr in 2019 without evidence of parenchymal lung disease.    -pft with restrictive physiology and preserved dlco c/w habitus  -echo unremarkable.             JOSUÉ on CPAP    Overview     -interrogation of cpap confirmed pressure of 15 cm H20  -machine is 15 years old  -unable to retrieve record of sleep study.    -requalification with ahi of 49             Other Visit Diagnoses     JOSUÉ (obstructive sleep apnea)    -  Primary    Relevant  Orders    CPAP FOR HOME USE          Education: During our discussion today, we talked about the etiology of obstructive sleep apnea as well as the potential ramifications of untreated sleep apnea, which could include daytime sleepiness, hypertension, heart disease and/or stroke.     Precautions: The patient was advised to abstain from driving should they feel sleepy or drowsy.       Patient will No follow-ups on file. with md/np.      25 minutes of total time spent on the encounter, which includes face to face time and non-face to face time preparing to see the patient (eg, review of tests), Obtaining and/or reviewing separately obtained history, documenting clinical information in the electronic or other health record, independently interpreting results (not separately reported) and communicating results to the patient/family/caregiver, or Care coordination (not separately reported).

## 2022-05-30 ENCOUNTER — OFFICE VISIT (OUTPATIENT)
Dept: FAMILY MEDICINE | Facility: CLINIC | Age: 67
End: 2022-05-30
Payer: MEDICARE

## 2022-05-30 VITALS
BODY MASS INDEX: 53.92 KG/M2 | DIASTOLIC BLOOD PRESSURE: 76 MMHG | SYSTOLIC BLOOD PRESSURE: 130 MMHG | HEART RATE: 104 BPM | OXYGEN SATURATION: 98 % | HEIGHT: 62 IN | TEMPERATURE: 99 F | WEIGHT: 293 LBS

## 2022-05-30 DIAGNOSIS — G57.12 MERALGIA PARESTHETICA OF LEFT SIDE: ICD-10-CM

## 2022-05-30 DIAGNOSIS — E11.9 TYPE 2 DIABETES MELLITUS WITHOUT COMPLICATION, WITHOUT LONG-TERM CURRENT USE OF INSULIN: Primary | ICD-10-CM

## 2022-05-30 DIAGNOSIS — J45.40 MODERATE PERSISTENT ASTHMA WITHOUT COMPLICATION: ICD-10-CM

## 2022-05-30 DIAGNOSIS — E11.9 DIABETES MELLITUS WITHOUT COMPLICATION: ICD-10-CM

## 2022-05-30 PROCEDURE — 3078F DIAST BP <80 MM HG: CPT | Mod: CPTII,S$GLB,, | Performed by: FAMILY MEDICINE

## 2022-05-30 PROCEDURE — 99999 PR PBB SHADOW E&M-EST. PATIENT-LVL III: CPT | Mod: PBBFAC,,, | Performed by: FAMILY MEDICINE

## 2022-05-30 PROCEDURE — 3008F BODY MASS INDEX DOCD: CPT | Mod: CPTII,S$GLB,, | Performed by: FAMILY MEDICINE

## 2022-05-30 PROCEDURE — 3288F PR FALLS RISK ASSESSMENT DOCUMENTED: ICD-10-PCS | Mod: CPTII,S$GLB,, | Performed by: FAMILY MEDICINE

## 2022-05-30 PROCEDURE — 1159F MED LIST DOCD IN RCRD: CPT | Mod: CPTII,S$GLB,, | Performed by: FAMILY MEDICINE

## 2022-05-30 PROCEDURE — 99214 OFFICE O/P EST MOD 30 MIN: CPT | Mod: S$GLB,,, | Performed by: FAMILY MEDICINE

## 2022-05-30 PROCEDURE — 1160F PR REVIEW ALL MEDS BY PRESCRIBER/CLIN PHARMACIST DOCUMENTED: ICD-10-PCS | Mod: CPTII,S$GLB,, | Performed by: FAMILY MEDICINE

## 2022-05-30 PROCEDURE — 1125F PR PAIN SEVERITY QUANTIFIED, PAIN PRESENT: ICD-10-PCS | Mod: CPTII,S$GLB,, | Performed by: FAMILY MEDICINE

## 2022-05-30 PROCEDURE — 99214 PR OFFICE/OUTPT VISIT, EST, LEVL IV, 30-39 MIN: ICD-10-PCS | Mod: S$GLB,,, | Performed by: FAMILY MEDICINE

## 2022-05-30 PROCEDURE — 99999 PR PBB SHADOW E&M-EST. PATIENT-LVL III: ICD-10-PCS | Mod: PBBFAC,,, | Performed by: FAMILY MEDICINE

## 2022-05-30 PROCEDURE — 99499 UNLISTED E&M SERVICE: CPT | Mod: S$GLB,,, | Performed by: FAMILY MEDICINE

## 2022-05-30 PROCEDURE — 3078F PR MOST RECENT DIASTOLIC BLOOD PRESSURE < 80 MM HG: ICD-10-PCS | Mod: CPTII,S$GLB,, | Performed by: FAMILY MEDICINE

## 2022-05-30 PROCEDURE — 3288F FALL RISK ASSESSMENT DOCD: CPT | Mod: CPTII,S$GLB,, | Performed by: FAMILY MEDICINE

## 2022-05-30 PROCEDURE — 1101F PT FALLS ASSESS-DOCD LE1/YR: CPT | Mod: CPTII,S$GLB,, | Performed by: FAMILY MEDICINE

## 2022-05-30 PROCEDURE — 1125F AMNT PAIN NOTED PAIN PRSNT: CPT | Mod: CPTII,S$GLB,, | Performed by: FAMILY MEDICINE

## 2022-05-30 PROCEDURE — 1101F PR PT FALLS ASSESS DOC 0-1 FALLS W/OUT INJ PAST YR: ICD-10-PCS | Mod: CPTII,S$GLB,, | Performed by: FAMILY MEDICINE

## 2022-05-30 PROCEDURE — 3075F SYST BP GE 130 - 139MM HG: CPT | Mod: CPTII,S$GLB,, | Performed by: FAMILY MEDICINE

## 2022-05-30 PROCEDURE — 3075F PR MOST RECENT SYSTOLIC BLOOD PRESS GE 130-139MM HG: ICD-10-PCS | Mod: CPTII,S$GLB,, | Performed by: FAMILY MEDICINE

## 2022-05-30 PROCEDURE — 1159F PR MEDICATION LIST DOCUMENTED IN MEDICAL RECORD: ICD-10-PCS | Mod: CPTII,S$GLB,, | Performed by: FAMILY MEDICINE

## 2022-05-30 PROCEDURE — 99499 RISK ADDL DX/OHS AUDIT: ICD-10-PCS | Mod: S$GLB,,, | Performed by: FAMILY MEDICINE

## 2022-05-30 PROCEDURE — 1160F RVW MEDS BY RX/DR IN RCRD: CPT | Mod: CPTII,S$GLB,, | Performed by: FAMILY MEDICINE

## 2022-05-30 PROCEDURE — 3008F PR BODY MASS INDEX (BMI) DOCUMENTED: ICD-10-PCS | Mod: CPTII,S$GLB,, | Performed by: FAMILY MEDICINE

## 2022-05-30 RX ORDER — ALBUTEROL SULFATE 90 UG/1
AEROSOL, METERED RESPIRATORY (INHALATION)
Qty: 24 G | Refills: 2 | Status: SHIPPED | OUTPATIENT
Start: 2022-05-30 | End: 2023-06-22 | Stop reason: SDUPTHER

## 2022-05-30 RX ORDER — CYCLOBENZAPRINE HCL 10 MG
10 TABLET ORAL 3 TIMES DAILY PRN
Qty: 30 TABLET | Refills: 0 | Status: SHIPPED | OUTPATIENT
Start: 2022-05-30 | End: 2022-06-09

## 2022-05-30 RX ORDER — IBUPROFEN 800 MG/1
800 TABLET ORAL EVERY 8 HOURS PRN
Qty: 40 TABLET | Refills: 0 | Status: SHIPPED | OUTPATIENT
Start: 2022-05-30 | End: 2022-11-15

## 2022-05-30 RX ORDER — NAPROXEN 500 MG/1
500 TABLET ORAL 2 TIMES DAILY WITH MEALS
Qty: 60 TABLET | Refills: 0 | Status: SHIPPED | OUTPATIENT
Start: 2022-05-30 | End: 2022-11-15

## 2022-05-30 NOTE — PROGRESS NOTES
Assessment & Plan  Problem List Items Addressed This Visit        Endocrine    Type 2 diabetes mellitus without complication, without long-term current use of insulin - Primary      Other Visit Diagnoses     Meralgia paresthetica of left side        Relevant Medications    cyclobenzaprine (FLEXERIL) 10 MG tablet    naproxen (NAPROSYN) 500 MG tablet    Moderate persistent asthma without complication        Relevant Medications    albuterol (PROVENTIL/VENTOLIN HFA) 90 mcg/actuation inhaler    Diabetes mellitus without complication        Relevant Orders    Microalbumin/Creatinine Ratio, Urine    Hemoglobin A1C    Lipid Panel            Health Maintenance reviewed  Follow-up: No follow-ups on file.    ______________________________________________________________________    Chief Complaint  Chief Complaint   Patient presents with    Diabetes     Follow up       HPI  Hilary Wilson is a 67 y.o. female with multiple medical diagnoses as listed in the medical history and problem list that presents for diabetes.  Pt is known to me with last appointment 5/3/2021.    Patient denies any new symptoms including chest pain, SOB, blurry vision, N/V, diarrhea.  Diabetes: The patient  denies any problems with low blood sugars. The patient  reports that they have been complaint with current treatment plan (diet, exercise, medication).    She describes a sharp pain on the left leg.  It began one week ago.  She used an ibuprofen without relief.  She has had this before.        PAST MEDICAL HISTORY:  Past Medical History:   Diagnosis Date    Asthma     Diabetes mellitus     Hyperlipidemia     Hypertension     JOSUÉ on CPAP     Uterine cancer     Vaginal delivery     x3       PAST SURGICAL HISTORY:  Past Surgical History:   Procedure Laterality Date    BREAST BIOPSY Left     COLONOSCOPY N/A 11/3/2016    Procedure: COLONOSCOPY;  Surgeon: Zhang Diez MD;  Location: [facility];  Service: Endoscopy;  Laterality: N/A;     HYSTERECTOMY  03/2019    HYSTEROSCOPY WITH DILATION AND CURETTAGE OF UTERUS N/A 1/25/2019    Procedure: HYSTEROSCOPY, WITH DILATION AND CURETTAGE OF UTERUS;  Surgeon: Elías Nolasco MD;  Location: [facility];  Service: OB/GYN;  Laterality: N/A;  RN PRE OP 1-22-19---BMI--61.3  TUCLEAR MYOSURE TAHIRA PRECHTER [phone] SPOKE TO HER ON 1-23-19 @ 12:23PM    mass removal      OOPHORECTOMY  03/2019    MN REMOVAL OF OVARY/TUBE(S)      ROBOT-ASSISTED LAPAROSCOPIC ABDOMINAL HYSTERECTOMY USING DA MALCOLM XI N/A 3/19/2019    Procedure: XI ROBOTIC HYSTERECTOMY;  Surgeon: Stiven Geronimo MD;  Location: [facility] OR [address] [place] [address];  Service: OB/GYN;  Laterality: N/A;  POSS DIFF INTUBATION-GLIDESCOPE NEEDED  Wt 341 lbs  T & S am of surgery    ROBOT-ASSISTED LAPAROSCOPIC SALPINGO-OOPHORECTOMY USING DA MALCOLM XI Bilateral 3/19/2019    Procedure: XI ROBOTIC SALPINGO-OOPHORECTOMY;  Surgeon: Stiven Geronimo MD;  Location: [facility] OR [address] [place] [address];  Service: OB/GYN;  Laterality: Bilateral;    TUBAL LIGATION         SOCIAL HISTORY:  Social History     Socioeconomic History    Marital status:    Tobacco Use    Smoking status: Never Smoker    Smokeless tobacco: Never Used   Substance and Sexual Activity    Alcohol use: No    Drug use: No    Sexual activity: Yes     Partners: Male       FAMILY HISTORY:  Family History   Problem Relation Age of Onset    Hypertension Mother     Kidney disease Mother     Diabetes Father     Stroke Father     Heart disease Sister 49    Kidney disease Sister     Stroke Sister         in 50s       ALLERGIES AND MEDICATIONS: updated and reviewed.  Review of patient's allergies indicates:  No Known Allergies  Current Outpatient Medications   Medication Sig Dispense Refill    hydroCHLOROthiazide (HYDRODIURIL) 25 MG tablet TAKE 1 TABLET BY MOUTH EVERY DAY 90 tablet 3    albuterol (PROVENTIL/VENTOLIN HFA) 90 mcg/actuation inhaler INHALE 2 PUFFS ORALLY EVERY SIX HOURS AS NEEDED FOR WHEEZING 24 g 2     "cyclobenzaprine (FLEXERIL) 10 MG tablet Take 1 tablet (10 mg total) by mouth 3 (three) times daily as needed for Muscle spasms. 30 tablet 0    ibuprofen (ADVIL,MOTRIN) 800 MG tablet Take 1 tablet (800 mg total) by mouth every 8 (eight) hours as needed for Pain. 40 tablet 0    naproxen (NAPROSYN) 500 MG tablet Take 1 tablet (500 mg total) by mouth 2 (two) times daily with meals. 60 tablet 0     No current facility-administered medications for this visit.         ROS  Review of Systems   Constitutional: Negative for activity change, appetite change, fatigue, fever and unexpected weight change.   HENT: Negative.  Negative for ear discharge, ear pain, rhinorrhea and sore throat.    Eyes: Negative.    Respiratory: Negative for apnea, cough, chest tightness, shortness of breath and wheezing.    Cardiovascular: Negative for chest pain, palpitations and leg swelling.   Gastrointestinal: Negative for abdominal distention, abdominal pain, constipation, diarrhea and vomiting.   Endocrine: Negative for cold intolerance, heat intolerance, polydipsia and polyuria.   Genitourinary: Negative for decreased urine volume and urgency.   Musculoskeletal: Positive for arthralgias, gait problem and myalgias.   Skin: Negative for rash.   Neurological: Negative for dizziness and headaches.   Hematological: Does not bruise/bleed easily.   Psychiatric/Behavioral: Negative for agitation, sleep disturbance and suicidal ideas.           Physical Exam  Vitals:    05/30/22 1424   BP: 130/76   Pulse: 104   Temp: 99 °F (37.2 °C)   TempSrc: Oral   SpO2: 98%   Weight: (!) 160.3 kg (353 lb 6.4 oz)   Height: 5' 2" (1.575 m)    Body mass index is 64.64 kg/m².  Weight: (!) 160.3 kg (353 lb 6.4 oz)   Height: 5' 2" (157.5 cm)   Physical Exam  Vitals reviewed.   Constitutional:       Appearance: She is well-developed.   HENT:      Head: Normocephalic and atraumatic.      Right Ear: External ear normal.      Left Ear: External ear normal.      Nose: Nose " normal.   Eyes:      Conjunctiva/sclera: Conjunctivae normal.      Pupils: Pupils are equal, round, and reactive to light.   Cardiovascular:      Rate and Rhythm: Normal rate and regular rhythm.      Heart sounds: Normal heart sounds.   Pulmonary:      Effort: Pulmonary effort is normal.      Breath sounds: Normal breath sounds.   Skin:     General: Skin is warm and dry.   Neurological:      Mental Status: She is alert and oriented to person, place, and time.         Health Maintenance       Date Due Completion Date    Pneumococcal Vaccines (Age 65+) (1 - PCV) Never done ---    Foot Exam Never done ---    TETANUS VACCINE Never done ---    Shingles Vaccine (1 of 2) Never done ---    Low Dose Statin Never done ---    Hemoglobin A1c 11/11/2021 5/11/2021    Eye Exam 03/10/2022 3/10/2021    COVID-19 Vaccine (4 - Booster for Moderna series) 04/23/2022 12/23/2021    Lipid Panel 05/11/2022 5/11/2021    Diabetes Urine Screening 05/03/2022 5/3/2021    Influenza Vaccine (Season Ended) 09/01/2022 ---    Mammogram 03/18/2023 3/18/2022    DEXA Scan 07/29/2024 7/29/2021    Colorectal Cancer Screening 01/28/2027 1/28/2022    Override on 5/7/2010: Done (Dr. Roberts, [facility])              Patient note was created using [employer].  Any errors in syntax or even information may not have been identified and edited on initial review prior to signing this note.

## 2022-06-01 ENCOUNTER — PATIENT MESSAGE (OUTPATIENT)
Dept: ADMINISTRATIVE | Facility: HOSPITAL | Age: 67
End: 2022-06-01
Payer: MEDICARE

## 2022-06-03 ENCOUNTER — PATIENT MESSAGE (OUTPATIENT)
Dept: ADMINISTRATIVE | Facility: HOSPITAL | Age: 67
End: 2022-06-03
Payer: MEDICARE

## 2022-08-14 NOTE — ANESTHESIA POSTPROCEDURE EVALUATION
"Anesthesia Post Evaluation    Patient: Hilary Wilson    Procedure(s) Performed: Procedure(s) (LRB):  XI ROBOTIC HYSTERECTOMY (N/A)  XI ROBOTIC SALPINGO-OOPHORECTOMY (Bilateral)    Final Anesthesia Type: general  Patient location during evaluation: PACU  Patient participation: Yes- Able to Participate  Level of consciousness: awake and alert  Post-procedure vital signs: reviewed and stable  Pain management: adequate  Airway patency: patent  PONV status at discharge: No PONV  Anesthetic complications: no      Cardiovascular status: blood pressure returned to baseline and hemodynamically stable  Respiratory status: spontaneous ventilation and nasal cannula  Hydration status: euvolemic  Follow-up not needed.        Visit Vitals  /70 (BP Location: Left arm, Patient Position: Lying)   Pulse 76   Temp 36.4 °C (97.5 °F) (Temporal)   Resp 17   Ht 5' 2" (1.575 m)   Wt (!) 154.2 kg (340 lb)   LMP 05/23/2014   SpO2 97%   Breastfeeding? No   BMI 62.19 kg/m²       Pain/Meryl Score: Pain Rating Prior to Med Admin: 4 (3/19/2019 11:16 AM)  Pain Rating Post Med Admin: 4 (3/19/2019 10:45 AM)  Meryl Score: 10 (3/19/2019 10:45 AM)        " 81.6

## 2022-08-15 ENCOUNTER — PATIENT OUTREACH (OUTPATIENT)
Dept: ADMINISTRATIVE | Facility: HOSPITAL | Age: 67
End: 2022-08-15
Payer: MEDICARE

## 2022-08-15 DIAGNOSIS — E11.9 TYPE 2 DIABETES MELLITUS WITHOUT COMPLICATION, WITHOUT LONG-TERM CURRENT USE OF INSULIN: Primary | ICD-10-CM

## 2022-08-15 NOTE — PROGRESS NOTES
LAPC 1 A1C Non-Compliant - labs already scheduled on 11/18/22    Overdue Diabetic Eye Exam - appointment scheduled on 11/08/22.

## 2022-08-24 ENCOUNTER — PATIENT MESSAGE (OUTPATIENT)
Dept: ADMINISTRATIVE | Facility: HOSPITAL | Age: 67
End: 2022-08-24
Payer: MEDICARE

## 2022-08-24 NOTE — PLAN OF CARE
Is she now taking losartan or not? If not, then no need to start it but if she is taking it then continue taking it.  Thanks.   POD #1 s/p RALH/BSO. Patient is doing well post-op. VSS. Afebrile. O2 sats 100% on room air. Pain is well controlled with oral pain meds. Tolerating regular diet without nausea or vomiting. Patient gonzalez removed. Patient ambulated. Plans for patient to discharge home today. No discharge needs identified. MERCEDES discussed patient with Dr. Chew and the EDWIN Rodríguez.     Post-operative follow-up appt requested by MERCEDES with Dr. Geronimo's clinic.    Discharge and follow-up instructions to be completed by the bedside nurse.     03/20/19 1110   Final Note   Assessment Type Final Discharge Note   Anticipated Discharge Disposition Home   What phone number can be called within the next 1-3 days to see how you are doing after discharge? ([phone])   Hospital Follow Up  Appt(s) scheduled? Yes   Discharge plans and expectations educations in teach back method with documentation complete? Yes

## 2022-09-26 ENCOUNTER — TELEPHONE (OUTPATIENT)
Dept: GYNECOLOGIC ONCOLOGY | Facility: CLINIC | Age: 67
End: 2022-09-26
Payer: MEDICARE

## 2022-09-26 NOTE — TELEPHONE ENCOUNTER
Spoke with our patient about her insurance, reschedule appointment she voiced understanding of the date, time and location. All questions answered appointment mail. Provider Scheduling Coord.  Gynecologic Oncology MA/PAR /Preceptor Delfino Sparks

## 2022-09-30 ENCOUNTER — PATIENT OUTREACH (OUTPATIENT)
Dept: ADMINISTRATIVE | Facility: HOSPITAL | Age: 67
End: 2022-09-30
Payer: MEDICARE

## 2022-09-30 NOTE — TELEPHONE ENCOUNTER
----- Message from Ene Dorantes MD sent at 10/18/2019  8:44 AM CDT -----  Please contact patient.  She will need to get back in to discuss her blood work.  She is now a diabetic.  We need to get her set up for diabetic education.   Resulted

## 2022-09-30 NOTE — LETTER
AUTHORIZATION FOR RELEASE OF   CONFIDENTIAL INFORMATION    Dear Dr. Angy Clifford,    We are seeing Hilary Wilson, date of birth 1955, in the clinic at [facility]ILY [facility]/ INTERNAL MED/ PEDS. Ene Boudreaux MD is the patient's PCP. Hilary Wilson has an outstanding lab/procedure at the time we reviewed her chart. In order to help keep her health information updated, she has authorized us to request the following medical record(s):        (  )  MAMMOGRAM                                      (  )  COLONOSCOPY      (  )  PAP SMEAR                                          (  )  OUTSIDE LAB RESULTS     (  )  DEXA SCAN                                          ( x )  EYE EXAM(diabetic)             (  )  FOOT EXAM                                          (  )  ENTIRE RECORD     (  )  OUTSIDE IMMUNIZATIONS                 (  )  _______________         Please fax records to [facility], Nichole G George, MD,  [phone].     If you have any questions, please contact Kellee Seymour LPN [facility] at   [phone].       Patient Name: Hilary Wilson  : 1955  Patient Phone #: [phone]

## 2022-09-30 NOTE — PROGRESS NOTES
LAPC1 A1C 9.26.22 Gap Report - appointment already scheduled on 11/18/22.    Overdue Diabetic Eye Exam - A request was sent today for patient's record.

## 2022-10-10 ENCOUNTER — PATIENT MESSAGE (OUTPATIENT)
Dept: ADMINISTRATIVE | Facility: HOSPITAL | Age: 67
End: 2022-10-10
Payer: MEDICARE

## 2022-10-12 ENCOUNTER — PATIENT OUTREACH (OUTPATIENT)
Dept: ADMINISTRATIVE | Facility: HOSPITAL | Age: 67
End: 2022-10-12
Payer: MEDICARE

## 2022-10-12 NOTE — PROGRESS NOTES
Release of information returned from Dr. Clifford's office, no eye exam done in 2022. Last eye exam done in 2021. Dr. Clifford's office left a message for the patient to get scheduled.

## 2022-10-24 ENCOUNTER — OFFICE VISIT (OUTPATIENT)
Dept: GYNECOLOGIC ONCOLOGY | Facility: CLINIC | Age: 67
End: 2022-10-24
Payer: MEDICARE

## 2022-10-24 VITALS
HEIGHT: 62 IN | WEIGHT: 293 LBS | HEART RATE: 106 BPM | SYSTOLIC BLOOD PRESSURE: 175 MMHG | DIASTOLIC BLOOD PRESSURE: 79 MMHG | BODY MASS INDEX: 53.92 KG/M2

## 2022-10-24 DIAGNOSIS — C54.1 ENDOMETRIAL CANCER: Primary | ICD-10-CM

## 2022-10-24 PROCEDURE — 3078F DIAST BP <80 MM HG: CPT | Mod: CPTII,S$GLB,, | Performed by: OBSTETRICS & GYNECOLOGY

## 2022-10-24 PROCEDURE — 1101F PR PT FALLS ASSESS DOC 0-1 FALLS W/OUT INJ PAST YR: ICD-10-PCS | Mod: CPTII,S$GLB,, | Performed by: OBSTETRICS & GYNECOLOGY

## 2022-10-24 PROCEDURE — 1125F AMNT PAIN NOTED PAIN PRSNT: CPT | Mod: CPTII,S$GLB,, | Performed by: OBSTETRICS & GYNECOLOGY

## 2022-10-24 PROCEDURE — 3288F PR FALLS RISK ASSESSMENT DOCUMENTED: ICD-10-PCS | Mod: CPTII,S$GLB,, | Performed by: OBSTETRICS & GYNECOLOGY

## 2022-10-24 PROCEDURE — 99499 UNLISTED E&M SERVICE: CPT | Mod: S$GLB,,, | Performed by: OBSTETRICS & GYNECOLOGY

## 2022-10-24 PROCEDURE — 99999 PR PBB SHADOW E&M-EST. PATIENT-LVL III: ICD-10-PCS | Mod: PBBFAC,,, | Performed by: OBSTETRICS & GYNECOLOGY

## 2022-10-24 PROCEDURE — 99213 PR OFFICE/OUTPT VISIT, EST, LEVL III, 20-29 MIN: ICD-10-PCS | Mod: S$GLB,,, | Performed by: OBSTETRICS & GYNECOLOGY

## 2022-10-24 PROCEDURE — 1159F PR MEDICATION LIST DOCUMENTED IN MEDICAL RECORD: ICD-10-PCS | Mod: CPTII,S$GLB,, | Performed by: OBSTETRICS & GYNECOLOGY

## 2022-10-24 PROCEDURE — 3077F PR MOST RECENT SYSTOLIC BLOOD PRESSURE >= 140 MM HG: ICD-10-PCS | Mod: CPTII,S$GLB,, | Performed by: OBSTETRICS & GYNECOLOGY

## 2022-10-24 PROCEDURE — 1101F PT FALLS ASSESS-DOCD LE1/YR: CPT | Mod: CPTII,S$GLB,, | Performed by: OBSTETRICS & GYNECOLOGY

## 2022-10-24 PROCEDURE — 99213 OFFICE O/P EST LOW 20 MIN: CPT | Mod: S$GLB,,, | Performed by: OBSTETRICS & GYNECOLOGY

## 2022-10-24 PROCEDURE — 99999 PR PBB SHADOW E&M-EST. PATIENT-LVL III: CPT | Mod: PBBFAC,,, | Performed by: OBSTETRICS & GYNECOLOGY

## 2022-10-24 PROCEDURE — 3077F SYST BP >= 140 MM HG: CPT | Mod: CPTII,S$GLB,, | Performed by: OBSTETRICS & GYNECOLOGY

## 2022-10-24 PROCEDURE — 1159F MED LIST DOCD IN RCRD: CPT | Mod: CPTII,S$GLB,, | Performed by: OBSTETRICS & GYNECOLOGY

## 2022-10-24 PROCEDURE — 1125F PR PAIN SEVERITY QUANTIFIED, PAIN PRESENT: ICD-10-PCS | Mod: CPTII,S$GLB,, | Performed by: OBSTETRICS & GYNECOLOGY

## 2022-10-24 PROCEDURE — 3288F FALL RISK ASSESSMENT DOCD: CPT | Mod: CPTII,S$GLB,, | Performed by: OBSTETRICS & GYNECOLOGY

## 2022-10-24 PROCEDURE — 3078F PR MOST RECENT DIASTOLIC BLOOD PRESSURE < 80 MM HG: ICD-10-PCS | Mod: CPTII,S$GLB,, | Performed by: OBSTETRICS & GYNECOLOGY

## 2022-10-26 NOTE — PROGRESS NOTES
"Subjective:      Patient ID: Hilary Wilson is a 67 y.o. female.    Chief Complaint: Follow-up    Treatment History  Clinical Stage IAG2 endometrial cancer (mainly in polyp, 3mm of 27 mm invasion)  RALH/BSO/[place] for uterine extraction  Morbid obesity precluding staging    Follow-up  Pertinent negatives include no abdominal pain, arthralgias, chest pain, chills, coughing, fatigue, fever, nausea, numbness, rash, sore throat, vomiting or weakness.   Here today for continued surveillance. Denies VB, F/C, N/V.  No new symptoms per patient.  Reports she has been doing well.    Review of Systems   Constitutional:  Negative for activity change, appetite change, chills, fatigue and fever.   HENT:  Negative for hearing loss, mouth sores, nosebleeds, sore throat and tinnitus.    Eyes:  Negative for visual disturbance.   Respiratory:  Negative for cough, chest tightness, shortness of breath and wheezing.    Cardiovascular:  Negative for chest pain and leg swelling.   Gastrointestinal:  Negative for abdominal distention, abdominal pain, blood in stool, constipation, diarrhea, nausea and vomiting.   Genitourinary:  Negative for dysuria, flank pain, frequency, hematuria, pelvic pain, vaginal bleeding, vaginal discharge and vaginal pain.   Musculoskeletal:  Negative for arthralgias and back pain.   Skin:  Negative for rash.   Neurological:  Negative for dizziness, seizures, syncope, weakness and numbness.   Hematological:  Does not bruise/bleed easily.   Psychiatric/Behavioral:  Negative for confusion and sleep disturbance. The patient is not nervous/anxious.    BP (!) 175/79   Pulse 106   Ht 5' 2" (1.575 m)   Wt (!) 160 kg (352 lb 11.8 oz)   LMP 05/23/2014 Comment: having bleeding  DUB  BMI 64.52 kg/m²     Objective:   Physical Exam:   Constitutional: She is oriented to person, place, and time. She appears well-developed and well-nourished. No distress.    HENT:   Head: Normocephalic and atraumatic.    Eyes: No " I have reviewed the H&P from the PCP and no major changes in condition.      Ralph Millan MD      scleral icterus.     Cardiovascular:       Exam reveals no cyanosis and no edema.        Pulmonary/Chest: Effort normal. No respiratory distress. She exhibits no tenderness.        Abdominal: Soft. She exhibits no distension, no fluid wave and no mass. There is no abdominal tenderness. There is no rebound and no guarding. No hernia.     Genitourinary:    Vagina normal.      Pelvic exam was performed with patient supine.   There is no rash, tenderness or lesion on the right labia. There is no rash, tenderness or lesion on the left labia. Right adnexum displays no mass, no tenderness and no fullness. Left adnexum displays no mass, no tenderness and no fullness. Vaginal cuff normal.  No  no vaginal discharge, bleeding (cuff without lesion) or unspecified prolapse of vaginal walls in the vagina. Cervix is absent.Uterus is absent.           Musculoskeletal: Moves all extremeties. No edema.      Lymphadenopathy:     She has no cervical adenopathy.    Neurological: She is alert and oriented to person, place, and time.    Skin: Skin is warm and dry. No cyanosis. No pallor.    Psychiatric: She has a normal mood and affect. Her behavior is normal.     Assessment:     1. Endometrial cancer        Plan:       BRITTNEY on exam today.   RTC 6 months

## 2022-10-27 ENCOUNTER — PES CALL (OUTPATIENT)
Dept: ADMINISTRATIVE | Facility: CLINIC | Age: 67
End: 2022-10-27
Payer: MEDICARE

## 2022-11-01 ENCOUNTER — TELEPHONE (OUTPATIENT)
Dept: ADMINISTRATIVE | Facility: CLINIC | Age: 67
End: 2022-11-01
Payer: MEDICARE

## 2022-11-01 NOTE — TELEPHONE ENCOUNTER
Called pt, informed pt I was calling to remind pt of her in office EAWV on 11/3/22; clinic location provided to patient; pt confirmed appointment

## 2022-11-03 ENCOUNTER — TELEPHONE (OUTPATIENT)
Dept: FAMILY MEDICINE | Facility: CLINIC | Age: 67
End: 2022-11-03
Payer: MEDICARE

## 2022-11-03 NOTE — TELEPHONE ENCOUNTER
----- Message from Elizabeth Griffith sent at 11/3/2022  1:49 PM CDT -----  Regarding: reschedule visit  Name of Who is Calling: KIMMY REYNA [[id number]]      What is the request in detail: Patient is requesting a call back to reschedule herENHANCED ANNUAL WELLNESS VISIT [[id number]] that was scheduled for today       Can the clinic reply by [facility]: no      What Number to Call Back if not in [facility]: [phone]                                      
Message left to return call to clinic   
13.77

## 2022-11-04 ENCOUNTER — PES CALL (OUTPATIENT)
Dept: ADMINISTRATIVE | Facility: CLINIC | Age: 67
End: 2022-11-04
Payer: MEDICARE

## 2022-11-08 ENCOUNTER — OFFICE VISIT (OUTPATIENT)
Dept: OPTOMETRY | Facility: CLINIC | Age: 67
End: 2022-11-08
Payer: MEDICARE

## 2022-11-08 DIAGNOSIS — H52.03 HYPEROPIA WITH PRESBYOPIA OF BOTH EYES: ICD-10-CM

## 2022-11-08 DIAGNOSIS — H25.13 NUCLEAR SCLEROSIS, BILATERAL: ICD-10-CM

## 2022-11-08 DIAGNOSIS — E11.9 DIABETES MELLITUS TYPE 2 WITHOUT RETINOPATHY: Primary | ICD-10-CM

## 2022-11-08 DIAGNOSIS — H52.4 HYPEROPIA WITH PRESBYOPIA OF BOTH EYES: ICD-10-CM

## 2022-11-08 DIAGNOSIS — Z13.5 GLAUCOMA SCREENING: ICD-10-CM

## 2022-11-08 PROCEDURE — 2023F DILAT RTA XM W/O RTNOPTHY: CPT | Mod: CPTII,S$GLB,, | Performed by: OPTOMETRIST

## 2022-11-08 PROCEDURE — 2023F PR DILATED RETINAL EXAM W/O EVID OF RETINOPATHY: ICD-10-PCS | Mod: CPTII,S$GLB,, | Performed by: OPTOMETRIST

## 2022-11-08 PROCEDURE — 92015 PR REFRACTION: ICD-10-PCS | Mod: S$GLB,,, | Performed by: OPTOMETRIST

## 2022-11-08 PROCEDURE — 3288F PR FALLS RISK ASSESSMENT DOCUMENTED: ICD-10-PCS | Mod: CPTII,S$GLB,, | Performed by: OPTOMETRIST

## 2022-11-08 PROCEDURE — 1101F PT FALLS ASSESS-DOCD LE1/YR: CPT | Mod: CPTII,S$GLB,, | Performed by: OPTOMETRIST

## 2022-11-08 PROCEDURE — 99499 UNLISTED E&M SERVICE: CPT | Mod: S$GLB,,, | Performed by: OPTOMETRIST

## 2022-11-08 PROCEDURE — 99999 PR PBB SHADOW E&M-EST. PATIENT-LVL II: CPT | Mod: PBBFAC,,, | Performed by: OPTOMETRIST

## 2022-11-08 PROCEDURE — 1159F PR MEDICATION LIST DOCUMENTED IN MEDICAL RECORD: ICD-10-PCS | Mod: CPTII,S$GLB,, | Performed by: OPTOMETRIST

## 2022-11-08 PROCEDURE — 99999 PR PBB SHADOW E&M-EST. PATIENT-LVL II: ICD-10-PCS | Mod: PBBFAC,,, | Performed by: OPTOMETRIST

## 2022-11-08 PROCEDURE — 3288F FALL RISK ASSESSMENT DOCD: CPT | Mod: CPTII,S$GLB,, | Performed by: OPTOMETRIST

## 2022-11-08 PROCEDURE — 92015 DETERMINE REFRACTIVE STATE: CPT | Mod: S$GLB,,, | Performed by: OPTOMETRIST

## 2022-11-08 PROCEDURE — 99499 RISK ADDL DX/OHS AUDIT: ICD-10-PCS | Mod: S$GLB,,, | Performed by: OPTOMETRIST

## 2022-11-08 PROCEDURE — 92004 PR EYE EXAM, NEW PATIENT,COMPREHESV: ICD-10-PCS | Mod: S$GLB,,, | Performed by: OPTOMETRIST

## 2022-11-08 PROCEDURE — 1101F PR PT FALLS ASSESS DOC 0-1 FALLS W/OUT INJ PAST YR: ICD-10-PCS | Mod: CPTII,S$GLB,, | Performed by: OPTOMETRIST

## 2022-11-08 PROCEDURE — 1159F MED LIST DOCD IN RCRD: CPT | Mod: CPTII,S$GLB,, | Performed by: OPTOMETRIST

## 2022-11-08 PROCEDURE — 92004 COMPRE OPH EXAM NEW PT 1/>: CPT | Mod: S$GLB,,, | Performed by: OPTOMETRIST

## 2022-11-08 NOTE — PROGRESS NOTES
HPI    Diabetic  eye exam    First Time Patient    67 y.o. is here for diabetic eye exam  Pt states  her last eye exam was a year ago  Pt states in order for her to read she have to close OS  Pt wear PAL gl to read and watch tv only  Pt want to get a new rx for gl    Hemoglobin A1C       Date                     Value               Ref Range             Status                05/11/2021               6.3 (H)             4.0 - 5.6 %           Final                 02/13/2020               6.6 (H)             4.0 - 5.6 %           Final                  10/15/2019               6.6 (H)             4.0 - 5.6 %           Final            Last edited by Gerry Melchor, OD on 11/8/2022 11:43 AM.            Assessment /Plan     For exam results, see Encounter Report.    Diabetes mellitus type 2 without retinopathy  -No retinopathy noted today.  Continued control with primary care physician and annual comprehensive eye exam.    Nuclear sclerosis, bilateral  -Educated patient on presence of cataracts at today's exam, monitor at annual dilated fundus exam. 3-6 years surgical estimate.    Glaucoma screening  -Monitor with annual eye exam and IOP check    Hyperopia with presbyopia of both eyes  Eyeglass Final Rx       Eyeglass Final Rx         Sphere Cylinder Axis Dist VA Add    Right +2.25 Sphere  20/25 +2.50    Left +1.25 +0.50 180 20/25 +2.50      Type: PAL    Expiration Date: 11/8/2023                  RTC 1 yr

## 2022-11-14 ENCOUNTER — TELEPHONE (OUTPATIENT)
Dept: ADMINISTRATIVE | Facility: CLINIC | Age: 67
End: 2022-11-14
Payer: MEDICARE

## 2022-11-15 ENCOUNTER — OFFICE VISIT (OUTPATIENT)
Dept: INTERNAL MEDICINE | Facility: CLINIC | Age: 67
End: 2022-11-15
Payer: MEDICARE

## 2022-11-15 VITALS — SYSTOLIC BLOOD PRESSURE: 141 MMHG | DIASTOLIC BLOOD PRESSURE: 59 MMHG | HEART RATE: 90 BPM

## 2022-11-15 DIAGNOSIS — C54.1 ENDOMETRIAL CANCER: ICD-10-CM

## 2022-11-15 DIAGNOSIS — Z98.890 STATUS POST HYSTEROSCOPY: ICD-10-CM

## 2022-11-15 DIAGNOSIS — I10 ESSENTIAL HYPERTENSION: ICD-10-CM

## 2022-11-15 DIAGNOSIS — E11.9 TYPE 2 DIABETES MELLITUS WITHOUT COMPLICATION, WITHOUT LONG-TERM CURRENT USE OF INSULIN: ICD-10-CM

## 2022-11-15 DIAGNOSIS — Z00.00 ENCOUNTER FOR PREVENTIVE HEALTH EXAMINATION: Primary | ICD-10-CM

## 2022-11-15 DIAGNOSIS — G47.33 OSA ON CPAP: ICD-10-CM

## 2022-11-15 DIAGNOSIS — R06.09 DYSPNEA ON EXERTION: ICD-10-CM

## 2022-11-15 DIAGNOSIS — Z90.710 S/P LAPAROSCOPIC HYSTERECTOMY: ICD-10-CM

## 2022-11-15 DIAGNOSIS — N85.02 ENDOMETRIAL HYPERPLASIA WITH ATYPIA: ICD-10-CM

## 2022-11-15 PROCEDURE — 3077F SYST BP >= 140 MM HG: CPT | Mod: CPTII,95,, | Performed by: NURSE PRACTITIONER

## 2022-11-15 PROCEDURE — 1159F PR MEDICATION LIST DOCUMENTED IN MEDICAL RECORD: ICD-10-PCS | Mod: CPTII,95,, | Performed by: NURSE PRACTITIONER

## 2022-11-15 PROCEDURE — G0439 PR MEDICARE ANNUAL WELLNESS SUBSEQUENT VISIT: ICD-10-PCS | Mod: 95,,, | Performed by: NURSE PRACTITIONER

## 2022-11-15 PROCEDURE — 3078F PR MOST RECENT DIASTOLIC BLOOD PRESSURE < 80 MM HG: ICD-10-PCS | Mod: CPTII,95,, | Performed by: NURSE PRACTITIONER

## 2022-11-15 PROCEDURE — 3078F DIAST BP <80 MM HG: CPT | Mod: CPTII,95,, | Performed by: NURSE PRACTITIONER

## 2022-11-15 PROCEDURE — 1101F PT FALLS ASSESS-DOCD LE1/YR: CPT | Mod: CPTII,95,, | Performed by: NURSE PRACTITIONER

## 2022-11-15 PROCEDURE — 1159F MED LIST DOCD IN RCRD: CPT | Mod: CPTII,95,, | Performed by: NURSE PRACTITIONER

## 2022-11-15 PROCEDURE — 1101F PR PT FALLS ASSESS DOC 0-1 FALLS W/OUT INJ PAST YR: ICD-10-PCS | Mod: CPTII,95,, | Performed by: NURSE PRACTITIONER

## 2022-11-15 PROCEDURE — 3288F PR FALLS RISK ASSESSMENT DOCUMENTED: ICD-10-PCS | Mod: CPTII,95,, | Performed by: NURSE PRACTITIONER

## 2022-11-15 PROCEDURE — 3077F PR MOST RECENT SYSTOLIC BLOOD PRESSURE >= 140 MM HG: ICD-10-PCS | Mod: CPTII,95,, | Performed by: NURSE PRACTITIONER

## 2022-11-15 PROCEDURE — 1160F RVW MEDS BY RX/DR IN RCRD: CPT | Mod: CPTII,95,, | Performed by: NURSE PRACTITIONER

## 2022-11-15 PROCEDURE — 1160F PR REVIEW ALL MEDS BY PRESCRIBER/CLIN PHARMACIST DOCUMENTED: ICD-10-PCS | Mod: CPTII,95,, | Performed by: NURSE PRACTITIONER

## 2022-11-15 PROCEDURE — 1170F PR FUNCTIONAL STATUS ASSESSED: ICD-10-PCS | Mod: CPTII,95,, | Performed by: NURSE PRACTITIONER

## 2022-11-15 PROCEDURE — 1170F FXNL STATUS ASSESSED: CPT | Mod: CPTII,95,, | Performed by: NURSE PRACTITIONER

## 2022-11-15 PROCEDURE — 3288F FALL RISK ASSESSMENT DOCD: CPT | Mod: CPTII,95,, | Performed by: NURSE PRACTITIONER

## 2022-11-15 PROCEDURE — G0439 PPPS, SUBSEQ VISIT: HCPCS | Mod: 95,,, | Performed by: NURSE PRACTITIONER

## 2022-11-15 NOTE — PATIENT INSTRUCTIONS
Counseling and Referral of Other Preventative  (Italic type indicates deductible and co-insurance are waived)    Patient Name: Hilary Wilson  Today's Date: 11/15/2022    Health Maintenance       Date Due Completion Date    Pneumococcal Vaccines (Age 65+) (1 - PCV) Never done ---    Foot Exam Never done ---    TETANUS VACCINE Never done ---    Shingles Vaccine (1 of 2) Never done ---    Low Dose Statin Never done ---    Hemoglobin A1c 11/11/2021 5/11/2021    COVID-19 Vaccine (4 - Booster for Moderna series) 02/17/2022 12/23/2021    Diabetes Urine Screening 05/03/2022 5/3/2021    Lipid Panel 05/11/2022 5/11/2021    Influenza Vaccine (1) Never done ---    Mammogram 03/18/2023 3/18/2022    Eye Exam 11/08/2023 11/8/2022    DEXA Scan 07/29/2024 7/29/2021    Colorectal Cancer Screening 01/28/2027 1/28/2022    Override on 5/7/2010: Done (Dr. Roberts, [facility])        No orders of the defined types were placed in this encounter.    The following information is provided to all patients.  This information is to help you find resources for any of the problems found today that may be affecting your health:                Living healthy guide: www.[facility].[place].gov      Understanding Diabetes: www.diabetes.org      Eating healthy: www.cdc.gov/healthyweight      CDC home safety checklist: www.cdc.gov/steadi/patient.html      Agency on Aging: www.goea.[place].gov      Alcoholics anonymous (AA): www.aa.org      Physical Activity: www.rai.nih.gov/vt1jsxi      Tobacco use: www.quitwithusla.org

## 2022-11-15 NOTE — PROGRESS NOTES
The patient location is: [place]  The chief complaint leading to consultation is: AWV    Visit type: audiovisual    Face to Face time with patient: Yes  35 minutes of total time spent on the encounter, which includes face to face time and non-face to face time preparing to see the patient (eg, review of tests), Obtaining and/or reviewing separately obtained history, Documenting clinical information in the electronic or other health record, Independently interpreting results (not separately reported) and communicating results to the patient/family/caregiver, or Care coordination (not separately reported).         Each patient to whom he or she provides medical services by telemedicine is:  (1) informed of the relationship between the physician and patient and the respective role of any other health care provider with respect to management of the patient; and (2) notified that he or she may decline to receive medical services by telemedicine and may withdraw from such care at any time.        Hilary Wilson presented for a  Medicare AWV and comprehensive Health Risk Assessment today. The following components were reviewed and updated:    Medical history  Family History  Social history  Allergies and Current Medications  Health Risk Assessment  Health Maintenance  Care Team         ** See Completed Assessments for Annual Wellness Visit within the encounter summary.**         The following assessments were completed:  Living Situation  CAGE  Depression Screening  Fall Risk Assessment (MACH 10)  Hearing Assessment(HHI)  Cognitive Function Screening  Nutrition Screening  ADL Screening  PAQ Screening    Constitutional: She is oriented to person, place, and time and well-developed, well-nourished, and in no distress. No distress.   HENT:   Head: Normocephalic and atraumatic.   Eyes: No scleral icterus.   Pulmonary/Chest: Effort normal. No respiratory distress.   Neurological: She is alert and oriented to person, place, and  time.   Skin: She is not diaphoretic.   Psychiatric: Mood and affect normal.           Diagnoses and health risks identified today and associated recommendations/orders:    1. Encounter for preventive health examination  Annual Health Risk Assessment (HRA) visit today.  Counseling and referral of health maintenance and preventative health measures performed.  Patient given annual wellness paperwork to take home.  Encouraged to return in 1 year for subsequent HRA visit.     2. Essential hypertension  Chronic. Stable. Uncontrolled (has a visit with PCP on 11/18/22). Encouraged to increase exercise as tolerated (moderate-intensity aerobic activity and muscle-strengthening activities) improve diet to heart healthy, low sodium diet.  Continue current treatment plan as previously prescribed by PCP.    3. Dyspnea on exertion  Chronic. Stable. Continue current treatment plan as previously prescribed by PCP.    4. Status post hysteroscopy  Chronic. Stable. Continue current treatment plan as previously prescribed by PCP.    5. S/P RATLH/BSO  Chronic. Stable. Continue current treatment plan as previously prescribed by PCP.    6. Endometrial hyperplasia with atypia  Chronic. Stable. Continue current treatment plan as previously prescribed by PCP.    7. Endometrial cancer  Chronic. Stable. Continue current treatment plan as previously prescribed by PCP.    8. Type 2 diabetes mellitus without complication, without long-term current use of insulin  Chronic. Stable. Controlled. Last Hgb A1c=6.3 from 5/11/21. Continue current treatment plan as previously prescribed by PCP.    9. BMI 60.0-69.9, adult  Chronic. Stable. Encouraged to increase exercise as tolerated and improve diet to heart healthy, low sodium diet. Continue current treatment plan as previously prescribed by PCP.    10. JOSUÉ on CPAP  Chronic. Stable. Continue current treatment plan as previously prescribed by PCP.          Cora Richard with a 5-10 year written  screening schedule and personal prevention plan. Recommendations were developed using the USPSTF age appropriate recommendations. Education, counseling, and referrals were provided as needed. After Visit Summary printed and given to patient which includes a list of additional screenings\tests needed.      I offered to discuss end of life issues, including information on how to make advance directives that the patient could use to name someone who would make medical decisions on their behalf if they became too ill to make themselves.    ___Patient declined  _X_Patient is interested, I provided paper work and offered to discuss.    No follow-ups on file.    See Griffith NP  I offered to discuss advanced care planning, including how to pick a person who would make decisions for you if you were unable to make them for yourself, called a health care power of , and what kind of decisions you might make such as use of life sustaining treatments such as ventilators and tube feeding when faced with a life limiting illness recorded on a living will that they will need to know. (How you want to be cared for as you near the end of your natural life)     X Patient is interested in learning more about how to make advanced directives.  I provided them paperwork and offered to discuss this with them.

## 2022-12-20 ENCOUNTER — PATIENT MESSAGE (OUTPATIENT)
Dept: FAMILY MEDICINE | Facility: CLINIC | Age: 67
End: 2022-12-20
Payer: MEDICARE

## 2023-01-19 ENCOUNTER — PATIENT MESSAGE (OUTPATIENT)
Dept: ADMINISTRATIVE | Facility: HOSPITAL | Age: 68
End: 2023-01-19
Payer: MEDICARE

## 2023-01-28 ENCOUNTER — PATIENT MESSAGE (OUTPATIENT)
Dept: ADMINISTRATIVE | Facility: HOSPITAL | Age: 68
End: 2023-01-28
Payer: MEDICARE

## 2023-03-09 ENCOUNTER — PES CALL (OUTPATIENT)
Dept: ADMINISTRATIVE | Facility: CLINIC | Age: 68
End: 2023-03-09
Payer: MEDICARE

## 2023-03-10 ENCOUNTER — OFFICE VISIT (OUTPATIENT)
Dept: PULMONOLOGY | Facility: CLINIC | Age: 68
End: 2023-03-10
Payer: MEDICARE

## 2023-03-10 ENCOUNTER — PES CALL (OUTPATIENT)
Dept: ADMINISTRATIVE | Facility: CLINIC | Age: 68
End: 2023-03-10
Payer: MEDICARE

## 2023-03-10 VITALS
SYSTOLIC BLOOD PRESSURE: 157 MMHG | OXYGEN SATURATION: 99 % | BODY MASS INDEX: 53.92 KG/M2 | WEIGHT: 293 LBS | DIASTOLIC BLOOD PRESSURE: 91 MMHG | HEART RATE: 118 BPM | HEIGHT: 62 IN

## 2023-03-10 DIAGNOSIS — R06.09 DYSPNEA ON EXERTION: ICD-10-CM

## 2023-03-10 DIAGNOSIS — G47.33 OSA ON CPAP: ICD-10-CM

## 2023-03-10 DIAGNOSIS — H61.22 IMPACTED CERUMEN OF LEFT EAR: ICD-10-CM

## 2023-03-10 PROCEDURE — 99999 PR PBB SHADOW E&M-EST. PATIENT-LVL III: ICD-10-PCS | Mod: PBBFAC,,, | Performed by: INTERNAL MEDICINE

## 2023-03-10 PROCEDURE — 99214 OFFICE O/P EST MOD 30 MIN: CPT | Mod: S$GLB,,, | Performed by: INTERNAL MEDICINE

## 2023-03-10 PROCEDURE — 1126F PR PAIN SEVERITY QUANTIFIED, NO PAIN PRESENT: ICD-10-PCS | Mod: CPTII,S$GLB,, | Performed by: INTERNAL MEDICINE

## 2023-03-10 PROCEDURE — 99214 PR OFFICE/OUTPT VISIT, EST, LEVL IV, 30-39 MIN: ICD-10-PCS | Mod: S$GLB,,, | Performed by: INTERNAL MEDICINE

## 2023-03-10 PROCEDURE — 3080F DIAST BP >= 90 MM HG: CPT | Mod: CPTII,S$GLB,, | Performed by: INTERNAL MEDICINE

## 2023-03-10 PROCEDURE — 3008F PR BODY MASS INDEX (BMI) DOCUMENTED: ICD-10-PCS | Mod: CPTII,S$GLB,, | Performed by: INTERNAL MEDICINE

## 2023-03-10 PROCEDURE — 3288F PR FALLS RISK ASSESSMENT DOCUMENTED: ICD-10-PCS | Mod: CPTII,S$GLB,, | Performed by: INTERNAL MEDICINE

## 2023-03-10 PROCEDURE — 3080F PR MOST RECENT DIASTOLIC BLOOD PRESSURE >= 90 MM HG: ICD-10-PCS | Mod: CPTII,S$GLB,, | Performed by: INTERNAL MEDICINE

## 2023-03-10 PROCEDURE — 1101F PR PT FALLS ASSESS DOC 0-1 FALLS W/OUT INJ PAST YR: ICD-10-PCS | Mod: CPTII,S$GLB,, | Performed by: INTERNAL MEDICINE

## 2023-03-10 PROCEDURE — 3008F BODY MASS INDEX DOCD: CPT | Mod: CPTII,S$GLB,, | Performed by: INTERNAL MEDICINE

## 2023-03-10 PROCEDURE — 1126F AMNT PAIN NOTED NONE PRSNT: CPT | Mod: CPTII,S$GLB,, | Performed by: INTERNAL MEDICINE

## 2023-03-10 PROCEDURE — 3072F PR LOW RISK FOR RETINOPATHY: ICD-10-PCS | Mod: CPTII,S$GLB,, | Performed by: INTERNAL MEDICINE

## 2023-03-10 PROCEDURE — 3077F PR MOST RECENT SYSTOLIC BLOOD PRESSURE >= 140 MM HG: ICD-10-PCS | Mod: CPTII,S$GLB,, | Performed by: INTERNAL MEDICINE

## 2023-03-10 PROCEDURE — 3077F SYST BP >= 140 MM HG: CPT | Mod: CPTII,S$GLB,, | Performed by: INTERNAL MEDICINE

## 2023-03-10 PROCEDURE — 99999 PR PBB SHADOW E&M-EST. PATIENT-LVL III: CPT | Mod: PBBFAC,,, | Performed by: INTERNAL MEDICINE

## 2023-03-10 PROCEDURE — 1159F PR MEDICATION LIST DOCUMENTED IN MEDICAL RECORD: ICD-10-PCS | Mod: CPTII,S$GLB,, | Performed by: INTERNAL MEDICINE

## 2023-03-10 PROCEDURE — 3288F FALL RISK ASSESSMENT DOCD: CPT | Mod: CPTII,S$GLB,, | Performed by: INTERNAL MEDICINE

## 2023-03-10 PROCEDURE — 3072F LOW RISK FOR RETINOPATHY: CPT | Mod: CPTII,S$GLB,, | Performed by: INTERNAL MEDICINE

## 2023-03-10 PROCEDURE — 1159F MED LIST DOCD IN RCRD: CPT | Mod: CPTII,S$GLB,, | Performed by: INTERNAL MEDICINE

## 2023-03-10 PROCEDURE — 1101F PT FALLS ASSESS-DOCD LE1/YR: CPT | Mod: CPTII,S$GLB,, | Performed by: INTERNAL MEDICINE

## 2023-03-10 NOTE — PROGRESS NOTES
Hilary Wilson  was seen as a follow up.    CHIEF COMPLAINT:    Chief Complaint   Patient presents with    Apnea       HISTORY OF PRESENT ILLNESS: Hilary Wilson is a 67 y.o. female is here for sleep evaluation.   Our first encounter was 4/8/22.   Patient was diagnosed with rachana at [facility].  Unclear of exact year, but patient believed it was around 4954-9016.  Was on cpap of 15 cm H20 and FFM.  Using cpap nightly.  During our initial visit, patient's main issue was that the on and off is stuck at time.  In addition, patient is unable to get replacement humidifier chamber.    With cpap, patient denied snoring.  Feeling rested upon awake.  No parasomnia.  No cataplexy.  No rls symptoms.      Patient was set up with new apap (10-18 cm H20) in 2022.  Currently with [facility].  Doing well with APAP.  With apap, patient denied snoring.  Feeling rested upon awake.      Dyspnea improved when compared initial visit.   No new issue since last visit.      [place] Sleepiness Scale score during initial sleep evaluation was 4.    SLEEP ROUTINE:  Activity the hour prior to sleep: read    Bed partner:    Time to bed:  12-1 am   Lights off:  Off   Sleep onset latency:  5 minutes         Disruptions or awakenings:    0-1 time for bathroom    Wakeup time:      5:30 am for prayer; go back sleep 7 am till 8:55 am   Perceived sleep quality:  rested       Daytime naps:       minutes afternoon  Weekend sleep routine:      same  Caffeine use: occasional coffee  exercise habit:   rarely      PAST MEDICAL HISTORY:    Active Ambulatory Problems     Diagnosis Date Noted    Essential hypertension 12/09/2015    Type 2 diabetes mellitus without complication, without long-term current use of insulin 12/09/2015    RACHANA on CPAP     BMI 60.0-69.9, adult 06/12/2017    Endometrial hyperplasia with atypia 01/17/2019    Status post hysteroscopy 01/25/2019    Endometrial cancer 02/15/2019    S/P RATLH/BSO 03/19/2019    Dyspnea on exertion  04/08/2022    Impacted cerumen of left ear 03/10/2023     Resolved Ambulatory Problems     Diagnosis Date Noted    Encounter for screening colonoscopy 11/03/2016     Past Medical History:   Diagnosis Date    Asthma     Diabetes mellitus     Hyperlipidemia     Hypertension     Uterine cancer     Vaginal delivery                 PAST SURGICAL HISTORY:    Past Surgical History:   Procedure Laterality Date    BREAST BIOPSY Left     COLONOSCOPY N/A 11/03/2016    Procedure: COLONOSCOPY;  Surgeon: hZang Diez MD;  Location: [facility] ENDO;  Service: Endoscopy;  Laterality: N/A;    HYSTERECTOMY  03/2019    HYSTEROSCOPY WITH DILATION AND CURETTAGE OF UTERUS N/A 01/25/2019    Procedure: HYSTEROSCOPY, WITH DILATION AND CURETTAGE OF UTERUS;  Surgeon: Elías Nolasco MD;  Location: [facility] OR;  Service: OB/GYN;  Laterality: N/A;  RN PRE OP 1-22-19---BMI--61.3  TUCLEAR MYOSURE TAHIRA PRECHTER [phone] SPOKE TO HER ON 1-23-19 @ 12:23PM    mass removal      OOPHORECTOMY  03/2019    VA REMOVAL OF OVARY/TUBE(S)      ROBOT-ASSISTED LAPAROSCOPIC ABDOMINAL HYSTERECTOMY USING DA MALCOLM XI N/A 03/19/2019    Procedure: XI ROBOTIC HYSTERECTOMY;  Surgeon: Stiven Geronimo MD;  Location: [facility] OR [facility] FLR;  Service: OB/GYN;  Laterality: N/A;  POSS DIFF INTUBATION-GLIDESCOPE NEEDED  Wt 341 lbs  T & S am of surgery    ROBOT-ASSISTED LAPAROSCOPIC SALPINGO-OOPHORECTOMY USING DA MALCOLM XI Bilateral 03/19/2019    Procedure: XI ROBOTIC SALPINGO-OOPHORECTOMY;  Surgeon: Stiven Geronimo MD;  Location: [facility] OR 2ND FLR;  Service: OB/GYN;  Laterality: Bilateral;    TUBAL LIGATION           FAMILY HISTORY:                Family History   Problem Relation Age of Onset    Hypertension Mother     Kidney disease Mother     Diabetes Father     Stroke Father     Heart disease Sister 49       SOCIAL HISTORY:          Tobacco:   Social History     Tobacco Use   Smoking Status Never   Smokeless Tobacco Never       alcohol use:    Social History     Substance  "and Sexual Activity   Alcohol Use No                 Occupation:  Retire; former     ALLERGIES:  Review of patient's allergies indicates:  No Known Allergies    CURRENT MEDICATIONS:    Current Outpatient Medications   Medication Sig Dispense Refill    albuterol (PROVENTIL/VENTOLIN HFA) 90 mcg/actuation inhaler INHALE 2 PUFFS ORALLY EVERY SIX HOURS AS NEEDED FOR WHEEZING 24 g 2    hydroCHLOROthiazide (HYDRODIURIL) 25 MG tablet Take 1 tablet (25 mg total) by mouth once daily. 90 tablet 0     No current facility-administered medications for this visit.                  REVIEW OF SYSTEMS:     Sleep related symptoms as per HPI.  CONST:Denies weight gain    HEENT: + sinus congestion  PULM: Denies dyspnea with adl  CARD:  Denies palpitations   GI:  Denies acid reflux  : Denies polyuria  NEURO: Denies headaches  PSYCH: Denies mood disturbance  HEME: Denies anemia   Otherwise, a balance of systems reviewed is negative.          PHYSICAL EXAM:  Vitals:    03/10/23 0900   BP: (!) 157/91   Pulse: (!) 118   SpO2: 99%   Weight: (!) 158 kg (348 lb 7 oz)   Height: 5' 2" (1.575 m)   PainSc: 0-No pain     Body mass index is 63.73 kg/m².     GENERAL: Normal development, well groomed  HEENT:  Conjunctivae are non-erythematous; Pupils equal, round, and reactive to light; Nose is symmetrical; Nasal mucosa is pink and moist; Septum is midline; Inferior turbinates are normal; Nasal airflow is normal; Posterior pharynx is pink; Modified Mallampati: 4; Posterior palate is normal; Tonsils +1; Uvula is normal and pink;Tongue is normal; Dentition is fair; No TMJ tenderness; Jaw opening and protrusion without click and without discomfort.    NECK: Supple. Neck circumference is 20 inches. No thyromegaly. No palpable nodes.     SKIN: On face and neck: No abrasions, no rashes, no lesions.  No subcutaneous nodules are palpable.  RESPIRATORY: Chest is clear to auscultation.  Normal chest expansion and non-labored breathing at " rest.  CARDIOVASCULAR: Normal S1, S2.  No murmurs, gallops or rubs. No carotid bruits bilaterally.  EXTREMITIES: + edema. No clubbing. No cyanosis. Station normal. Gait normal.        NEURO/PSYCH: Oriented to time, place and person. Normal attention span and concentration. Affect is full. Mood is normal.                                                                                   DATA   psg 4/21/22 ahi of 49    PFT 4/20/22 Ratio of 72%; FVC 2.12 L (91%); FEV1 1.53 L (83%); TLC 3.38 L (74%); dlco 19.98 (97%)     cxr 3/12/19 (personally reviewed) no consolidation or effusion    Echo 4/8/22  The left ventricle is normal in size with concentric hypertrophy and normal systolic function.  The estimated ejection fraction is 65%.  Normal left ventricular diastolic function.  Normal right ventricular size with normal right ventricular systolic function.  Normal central venous pressure (3 mmHg).  The estimated PA systolic pressure is 17 mmHg.         Lab Results   Component Value Date    TSH 0.752 05/11/2021     ASSESSMENT/PLAN  Problem List Items Addressed This Visit       Dyspnea on exertion    Overview     -limited mobility may relate to habitus and arthritis  -diagnosed with asthma around 2014.  Prn albuterol with adequate control.    -cxr in 2019 without evidence of parenchymal lung disease.    -pft with restrictive physiology and preserved dlco c/w habitus  -echo unremarkable         Impacted cerumen of left ear    Overview     -cerumen impaction noted on left ear.  Follow up with pcp         JOSUÉ on CPAP    Overview     -ahi of 49  -current with [facility] Apap.  Doing well well with good compliance.97%>4 hours.  Residual ahi of 0.4.  Patient is benefiting from apap.               Relevant Orders    CPAP/BIPAP SUPPLIES         Education: During our discussion today, we talked about the etiology of obstructive sleep apnea as well as the potential ramifications of untreated sleep apnea, which could include daytime  sleepiness, hypertension, heart disease and/or stroke.     Precautions: The patient was advised to abstain from driving should they feel sleepy or drowsy.       Patient will No follow-ups on file. with md/np.      25 minutes of total time spent on the encounter, which includes face to face time and non-face to face time preparing to see the patient (eg, review of tests), Obtaining and/or reviewing separately obtained history, documenting clinical information in the electronic or other health record, independently interpreting results (not separately reported) and communicating results to the patient/family/caregiver, or Care coordination (not separately reported).

## 2023-03-12 NOTE — PROGRESS NOTES
Patient, Hilary Wilson (MRN #[id number]), presented with a recorded BMI of 63.73 kg/m^2 consistent with the definition of morbid obesity (ICD-10 E66.01). The patient's morbid obesity was monitored, evaluated, addressed and/or treated. This addendum to the medical record is made on 03/12/2023.

## 2023-04-06 NOTE — ASSESSMENT & PLAN NOTE
Pt is currently stable on medication regimen.  Continue current therapy as scheduled.  Contact office with any questions about adjustments on medications.      Continue Regimen: Tretinoin 0.05% cream (started last week, tried her sister?s) Initiate Treatment: Doxycycline 100mg, Recommend use with OTC Benzoyl Peroxide soap Render In Strict Bullet Format?: No Detail Level: Zone

## 2023-04-10 ENCOUNTER — TELEPHONE (OUTPATIENT)
Dept: ADMINISTRATIVE | Facility: CLINIC | Age: 68
End: 2023-04-10
Payer: MEDICARE

## 2023-04-10 ENCOUNTER — PATIENT MESSAGE (OUTPATIENT)
Dept: ADMINISTRATIVE | Facility: CLINIC | Age: 68
End: 2023-04-10
Payer: MEDICARE

## 2023-04-10 NOTE — TELEPHONE ENCOUNTER
Called pt; informed pt I was calling to confirm her virtual EAWV on 4/12/23 at 3:00pm and to see if she needed any help; pt stated she did not need any help and would complete e-pre check later today; pt informed to login 15 minutes prior to appt time; sent message through portal

## 2023-04-12 ENCOUNTER — TELEPHONE (OUTPATIENT)
Dept: ADMINISTRATIVE | Facility: CLINIC | Age: 68
End: 2023-04-12
Payer: MEDICARE

## 2023-04-12 ENCOUNTER — OFFICE VISIT (OUTPATIENT)
Dept: HOME HEALTH SERVICES | Facility: CLINIC | Age: 68
End: 2023-04-12
Payer: MEDICARE

## 2023-04-12 ENCOUNTER — TELEPHONE (OUTPATIENT)
Dept: FAMILY MEDICINE | Facility: CLINIC | Age: 68
End: 2023-04-12
Payer: MEDICARE

## 2023-04-12 ENCOUNTER — PATIENT MESSAGE (OUTPATIENT)
Dept: ADMINISTRATIVE | Facility: HOSPITAL | Age: 68
End: 2023-04-12
Payer: MEDICARE

## 2023-04-12 DIAGNOSIS — Z99.89 DEPENDENCE ON OTHER ENABLING MACHINES AND DEVICES: ICD-10-CM

## 2023-04-12 DIAGNOSIS — E66.01 MORBID (SEVERE) OBESITY DUE TO EXCESS CALORIES: ICD-10-CM

## 2023-04-12 DIAGNOSIS — Z00.00 ENCOUNTER FOR PREVENTIVE HEALTH EXAMINATION: Primary | ICD-10-CM

## 2023-04-12 DIAGNOSIS — E11.9 TYPE 2 DIABETES MELLITUS WITHOUT COMPLICATION, WITHOUT LONG-TERM CURRENT USE OF INSULIN: ICD-10-CM

## 2023-04-12 DIAGNOSIS — Z12.31 VISIT FOR SCREENING MAMMOGRAM: Primary | ICD-10-CM

## 2023-04-12 PROCEDURE — 3288F FALL RISK ASSESSMENT DOCD: CPT | Mod: CPTII,95,, | Performed by: NURSE PRACTITIONER

## 2023-04-12 PROCEDURE — 1101F PR PT FALLS ASSESS DOC 0-1 FALLS W/OUT INJ PAST YR: ICD-10-PCS | Mod: CPTII,95,, | Performed by: NURSE PRACTITIONER

## 2023-04-12 PROCEDURE — 1170F PR FUNCTIONAL STATUS ASSESSED: ICD-10-PCS | Mod: CPTII,95,, | Performed by: NURSE PRACTITIONER

## 2023-04-12 PROCEDURE — 1159F MED LIST DOCD IN RCRD: CPT | Mod: CPTII,95,, | Performed by: NURSE PRACTITIONER

## 2023-04-12 PROCEDURE — G0439 PPPS, SUBSEQ VISIT: HCPCS | Mod: 95,,, | Performed by: NURSE PRACTITIONER

## 2023-04-12 PROCEDURE — 1159F PR MEDICATION LIST DOCUMENTED IN MEDICAL RECORD: ICD-10-PCS | Mod: CPTII,95,, | Performed by: NURSE PRACTITIONER

## 2023-04-12 PROCEDURE — 3072F LOW RISK FOR RETINOPATHY: CPT | Mod: CPTII,95,, | Performed by: NURSE PRACTITIONER

## 2023-04-12 PROCEDURE — 1170F FXNL STATUS ASSESSED: CPT | Mod: CPTII,95,, | Performed by: NURSE PRACTITIONER

## 2023-04-12 PROCEDURE — 3072F PR LOW RISK FOR RETINOPATHY: ICD-10-PCS | Mod: CPTII,95,, | Performed by: NURSE PRACTITIONER

## 2023-04-12 PROCEDURE — G0439 PR MEDICARE ANNUAL WELLNESS SUBSEQUENT VISIT: ICD-10-PCS | Mod: 95,,, | Performed by: NURSE PRACTITIONER

## 2023-04-12 PROCEDURE — 1160F RVW MEDS BY RX/DR IN RCRD: CPT | Mod: CPTII,95,, | Performed by: NURSE PRACTITIONER

## 2023-04-12 PROCEDURE — 1160F PR REVIEW ALL MEDS BY PRESCRIBER/CLIN PHARMACIST DOCUMENTED: ICD-10-PCS | Mod: CPTII,95,, | Performed by: NURSE PRACTITIONER

## 2023-04-12 PROCEDURE — 1101F PT FALLS ASSESS-DOCD LE1/YR: CPT | Mod: CPTII,95,, | Performed by: NURSE PRACTITIONER

## 2023-04-12 PROCEDURE — 3288F PR FALLS RISK ASSESSMENT DOCUMENTED: ICD-10-PCS | Mod: CPTII,95,, | Performed by: NURSE PRACTITIONER

## 2023-04-12 NOTE — PROGRESS NOTES
The patient location is: [place]  The chief complaint leading to consultation is: awv    Visit type: audiovisual    Face to Face time with patient: 60  60 minutes of total time spent on the encounter, which includes face to face time and non-face to face time preparing to see the patient (eg, review of tests), Obtaining and/or reviewing separately obtained history, Documenting clinical information in the electronic or other health record, Independently interpreting results (not separately reported) and communicating results to the patient/family/caregiver, or Care coordination (not separately reported).         Each patient to whom he or she provides medical services by telemedicine is:  (1) informed of the relationship between the physician and patient and the respective role of any other health care provider with respect to management of the patient; and (2) notified that he or she may decline to receive medical services by telemedicine and may withdraw from such care at any time.    Notes:   Review for Opioid Screening: Pt does not have Rx for Opioids    Review for Substance Use Disorders: Patient does not use substance        Hilary Wilson presented for a  Medicare AWV and comprehensive Health Risk Assessment today. The following components were reviewed and updated:    Medical history  Family History  Social history  Allergies and Current Medications  Health Risk Assessment  Health Maintenance  Care Team         ** See Completed Assessments for Annual Wellness Visit within the encounter summary.**         The following assessments were completed:  Living Situation  CAGE  Depression Screening  Fall Risk Assessment (MACH 10)  Hearing Assessment(HHI)  Cognitive Function Screening  Nutrition Screening  ADL Screening  PAQ Screening      There were no vitals filed for this visit.  There is no height or weight on file to calculate BMI.  Physical Exam  Constitutional:       Appearance: Normal appearance. She is  obese.   Neurological:      Mental Status: She is alert.   Psychiatric:         Attention and Perception: Attention and perception normal.         Mood and Affect: Mood and affect normal.         Speech: Speech normal.         Behavior: Behavior normal. Behavior is cooperative.         Thought Content: Thought content normal.         Cognition and Memory: Cognition and memory normal.         Judgment: Judgment normal.             Diagnoses and health risks identified today and associated recommendations/orders:    1. Encounter for preventive health examination  Stable, followed by provider     2. Type 2 diabetes mellitus without complication, without long-term current use of insulin  Stable, followed by provider, currently managed with diet and exercise as her A1C was below 6.5    3. Morbid (severe) obesity due to excess calories  Stable, followed by provider, currently managed with diet and exercise     4. Dependence on other enabling machines and devices  Stable, followed by provider, walks with a cane as needed      Provided Hilary with a 5-10 year written screening schedule and personal prevention plan. Recommendations were developed using the USPSTF age appropriate recommendations. Education, counseling, and referrals were provided as needed. After Visit Summary printed and given to patient which includes a list of additional screenings\tests needed.    Follow up in about 1 year (around 4/12/2024) for your next annual wellness visit.    Diogenes Crenshaw, PAOLO  I offered to discuss advanced care planning, including how to pick a person who would make decisions for you if you were unable to make them for yourself, called a health care power of , and what kind of decisions you might make such as use of life sustaining treatments such as ventilators and tube feeding when faced with a life limiting illness recorded on a living will that they will need to know. (How you want to be cared for as you near the end of  your natural life)     X Patient is interested in learning more about how to make advanced directives.  I provided them paperwork and offered to discuss this with them.

## 2023-04-12 NOTE — TELEPHONE ENCOUNTER
Called pt; informed pt I was just making a reminder call for pt's virtual visit today at 3:00pm and to see if pt needed any help; pt stated didn't need any help; pt informed to login 15 minutes prior to appt time

## 2023-04-12 NOTE — TELEPHONE ENCOUNTER
----- Message from Berna Bailey sent at 4/12/2023  2:42 PM CDT -----  Regarding: Request for Annual Mammogram  Type:  Mammogram    Caller is requesting to schedule their annual mammogram appointment.  Order is not listed in EPIC.  Please enter order and contact patient to schedule.    Name of Caller: Self     Where would they like the mammogram performed? [facility]    Would the patient rather a call back or a response via [facility]? Call     Best Call Back Number: [phone]      Additional Information: Also would like to get an order for her annual blood work.

## 2023-04-12 NOTE — PATIENT INSTRUCTIONS
Counseling and Referral of Other Preventative  (Italic type indicates deductible and co-insurance are waived)    Patient Name: Hilary Wilson  Today's Date: 4/12/2023    Health Maintenance       Date Due Completion Date    Pneumococcal Vaccines (Age 65+) (1 - PCV) Never done ---    Foot Exam Never done ---    TETANUS VACCINE Never done ---    Shingles Vaccine (1 of 2) Never done ---    Low Dose Statin Never done ---    Hemoglobin A1c 11/11/2021 5/11/2021    COVID-19 Vaccine (4 - Booster for Moderna series) 02/17/2022 12/23/2021    Diabetes Urine Screening 05/03/2022 5/3/2021    Lipid Panel 05/11/2022 5/11/2021    Influenza Vaccine (1) Never done ---    Mammogram 03/18/2023 3/18/2022    Eye Exam 11/08/2023 11/8/2022    DEXA Scan 07/29/2024 7/29/2021    Colorectal Cancer Screening 01/28/2027 1/28/2022    Override on 5/7/2010: Done (Dr. Roberts, [facility])        No orders of the defined types were placed in this encounter.    The following information is provided to all patients.  This information is to help you find resources for any of the problems found today that may be affecting your health:                Living healthy guide: www.[facility].[place].gov      Understanding Diabetes: www.diabetes.org      Eating healthy: www.cdc.gov/healthyweight      CDC home safety checklist: www.cdc.gov/steadi/patient.html      Agency on Aging: www.goea.[place].gov      Alcoholics anonymous (AA): www.aa.org      Physical Activity: www.rai.nih.gov/la7ibvz      Tobacco use: www.quitwithusla.org

## 2023-04-19 RX ORDER — HYDROCHLOROTHIAZIDE 25 MG/1
25 TABLET ORAL DAILY
Qty: 90 TABLET | Refills: 0 | Status: SHIPPED | OUTPATIENT
Start: 2023-04-19 | End: 2023-07-18

## 2023-04-19 NOTE — TELEPHONE ENCOUNTER
Refill Routing Note   Medication(s) are not appropriate for processing by [facility] for the following reason(s):      Required labs outdated  Required vitals abnormal    [facility] action(s):  Defer Labs due            Appointments  past 12m or future 3m with PCP    Date Provider   Last Visit   5/30/2022 Ene Boudreaux MD   Next Visit   7/7/2023 Ene Boudreaux MD   ED visits in past 90 days: 0        Note composed:2:16 PM 04/19/2023

## 2023-04-19 NOTE — TELEPHONE ENCOUNTER
Care Due:                  Date            Visit Type   Department     Provider  --------------------------------------------------------------------------------                                [facility]                              PRIMARY      MED/ INTERNAL  Last Visit: 05-      CARE ([facility])   MED/ PEDS      Ene Dorantes                              [facility]                              PRIMARY      MED/ INTERNAL  Next Visit: 07-      CARE ([facility])   MED/ PEDS      Ene Dorantes                                                            Last  Test          Frequency    Reason                     Performed    Due Date  --------------------------------------------------------------------------------    CMP.........  12 months..  hydroCHLOROthiazide......  05- 05-    [facility] Embedded Care Gaps. Reference number: [id number]. 4/19/2023   1:41:24 PM CDT

## 2023-04-24 ENCOUNTER — OFFICE VISIT (OUTPATIENT)
Dept: GYNECOLOGIC ONCOLOGY | Facility: CLINIC | Age: 68
End: 2023-04-24
Payer: MEDICARE

## 2023-04-24 VITALS
SYSTOLIC BLOOD PRESSURE: 149 MMHG | HEART RATE: 106 BPM | DIASTOLIC BLOOD PRESSURE: 77 MMHG | WEIGHT: 293 LBS | BODY MASS INDEX: 64.2 KG/M2

## 2023-04-24 DIAGNOSIS — C54.1 ENDOMETRIAL CANCER: Primary | ICD-10-CM

## 2023-04-24 PROCEDURE — 1125F AMNT PAIN NOTED PAIN PRSNT: CPT | Mod: CPTII,S$GLB,, | Performed by: OBSTETRICS & GYNECOLOGY

## 2023-04-24 PROCEDURE — 3008F PR BODY MASS INDEX (BMI) DOCUMENTED: ICD-10-PCS | Mod: CPTII,S$GLB,, | Performed by: OBSTETRICS & GYNECOLOGY

## 2023-04-24 PROCEDURE — 1101F PR PT FALLS ASSESS DOC 0-1 FALLS W/OUT INJ PAST YR: ICD-10-PCS | Mod: CPTII,S$GLB,, | Performed by: OBSTETRICS & GYNECOLOGY

## 2023-04-24 PROCEDURE — 3008F BODY MASS INDEX DOCD: CPT | Mod: CPTII,S$GLB,, | Performed by: OBSTETRICS & GYNECOLOGY

## 2023-04-24 PROCEDURE — 1159F PR MEDICATION LIST DOCUMENTED IN MEDICAL RECORD: ICD-10-PCS | Mod: CPTII,S$GLB,, | Performed by: OBSTETRICS & GYNECOLOGY

## 2023-04-24 PROCEDURE — 3072F PR LOW RISK FOR RETINOPATHY: ICD-10-PCS | Mod: CPTII,S$GLB,, | Performed by: OBSTETRICS & GYNECOLOGY

## 2023-04-24 PROCEDURE — 1159F MED LIST DOCD IN RCRD: CPT | Mod: CPTII,S$GLB,, | Performed by: OBSTETRICS & GYNECOLOGY

## 2023-04-24 PROCEDURE — 3288F PR FALLS RISK ASSESSMENT DOCUMENTED: ICD-10-PCS | Mod: CPTII,S$GLB,, | Performed by: OBSTETRICS & GYNECOLOGY

## 2023-04-24 PROCEDURE — 3077F PR MOST RECENT SYSTOLIC BLOOD PRESSURE >= 140 MM HG: ICD-10-PCS | Mod: CPTII,S$GLB,, | Performed by: OBSTETRICS & GYNECOLOGY

## 2023-04-24 PROCEDURE — 1101F PT FALLS ASSESS-DOCD LE1/YR: CPT | Mod: CPTII,S$GLB,, | Performed by: OBSTETRICS & GYNECOLOGY

## 2023-04-24 PROCEDURE — 3078F DIAST BP <80 MM HG: CPT | Mod: CPTII,S$GLB,, | Performed by: OBSTETRICS & GYNECOLOGY

## 2023-04-24 PROCEDURE — 3077F SYST BP >= 140 MM HG: CPT | Mod: CPTII,S$GLB,, | Performed by: OBSTETRICS & GYNECOLOGY

## 2023-04-24 PROCEDURE — 1125F PR PAIN SEVERITY QUANTIFIED, PAIN PRESENT: ICD-10-PCS | Mod: CPTII,S$GLB,, | Performed by: OBSTETRICS & GYNECOLOGY

## 2023-04-24 PROCEDURE — 3288F FALL RISK ASSESSMENT DOCD: CPT | Mod: CPTII,S$GLB,, | Performed by: OBSTETRICS & GYNECOLOGY

## 2023-04-24 PROCEDURE — 99999 PR PBB SHADOW E&M-EST. PATIENT-LVL III: CPT | Mod: PBBFAC,,, | Performed by: OBSTETRICS & GYNECOLOGY

## 2023-04-24 PROCEDURE — 3072F LOW RISK FOR RETINOPATHY: CPT | Mod: CPTII,S$GLB,, | Performed by: OBSTETRICS & GYNECOLOGY

## 2023-04-24 PROCEDURE — 99999 PR PBB SHADOW E&M-EST. PATIENT-LVL III: ICD-10-PCS | Mod: PBBFAC,,, | Performed by: OBSTETRICS & GYNECOLOGY

## 2023-04-24 PROCEDURE — 99214 PR OFFICE/OUTPT VISIT, EST, LEVL IV, 30-39 MIN: ICD-10-PCS | Mod: S$GLB,,, | Performed by: OBSTETRICS & GYNECOLOGY

## 2023-04-24 PROCEDURE — 1160F PR REVIEW ALL MEDS BY PRESCRIBER/CLIN PHARMACIST DOCUMENTED: ICD-10-PCS | Mod: CPTII,S$GLB,, | Performed by: OBSTETRICS & GYNECOLOGY

## 2023-04-24 PROCEDURE — 3078F PR MOST RECENT DIASTOLIC BLOOD PRESSURE < 80 MM HG: ICD-10-PCS | Mod: CPTII,S$GLB,, | Performed by: OBSTETRICS & GYNECOLOGY

## 2023-04-24 PROCEDURE — 99214 OFFICE O/P EST MOD 30 MIN: CPT | Mod: S$GLB,,, | Performed by: OBSTETRICS & GYNECOLOGY

## 2023-04-24 PROCEDURE — 1160F RVW MEDS BY RX/DR IN RCRD: CPT | Mod: CPTII,S$GLB,, | Performed by: OBSTETRICS & GYNECOLOGY

## 2023-04-24 NOTE — PROGRESS NOTES
Subjective:      Patient ID: Hilary Wilson is a 68 y.o. female.    Chief Complaint: Follow-up (6 month follow up)      Treatment History  Clinical Stage IAG2 endometrial cancer (mainly in polyp, 3mm of 27 mm invasion)  RALH/BSO/Minilap for uterine extraction  Morbid obesity precluding staging    Follow-up  Pertinent negatives include no abdominal pain, arthralgias, chest pain, chills, coughing, fatigue, fever, nausea, numbness, rash, sore throat, vomiting or weakness.   Here today for continued surveillance. Denies VB, F/C, N/V.  No new symptoms per patient.  Reports she has been doing well. Has a ventral hernia    Review of Systems   Constitutional:  Negative for activity change, appetite change, chills, fatigue and fever.   HENT:  Negative for hearing loss, mouth sores, nosebleeds, sore throat and tinnitus.    Eyes:  Negative for visual disturbance.   Respiratory:  Negative for cough, chest tightness, shortness of breath and wheezing.    Cardiovascular:  Negative for chest pain and leg swelling.   Gastrointestinal:  Negative for abdominal distention, abdominal pain, blood in stool, constipation, diarrhea, nausea and vomiting.   Genitourinary:  Negative for dysuria, flank pain, frequency, hematuria, pelvic pain, vaginal bleeding, vaginal discharge and vaginal pain.   Musculoskeletal:  Negative for arthralgias and back pain.   Skin:  Negative for rash.   Neurological:  Negative for dizziness, seizures, syncope, weakness and numbness.   Hematological:  Does not bruise/bleed easily.   Psychiatric/Behavioral:  Negative for confusion and sleep disturbance. The patient is not nervous/anxious.    BP (!) 149/77   Pulse 106   Wt (!) 159.2 kg (351 lb)   LMP 05/23/2014 Comment: having bleeding  DUB  BMI 64.20 kg/m²     Objective:   Physical Exam:   Constitutional: She is oriented to person, place, and time. She appears well-developed and well-nourished. No distress.    HENT:   Head: Normocephalic and atraumatic.     Eyes: No scleral icterus.     Cardiovascular:       Exam reveals no cyanosis and no edema.        Pulmonary/Chest: Effort normal. No respiratory distress. She exhibits no tenderness.        Abdominal: Soft. She exhibits no distension, no fluid wave and no mass. There is no abdominal tenderness. There is no rebound and no guarding. No hernia.     Genitourinary:    Vagina normal.      Pelvic exam was performed with patient supine.   There is no rash, tenderness or lesion on the right labia. There is no rash, tenderness or lesion on the left labia. Right adnexum displays no mass, no tenderness and no fullness. Left adnexum displays no mass, no tenderness and no fullness. Vaginal cuff normal.  No  no vaginal discharge, bleeding (cuff without lesion) or unspecified prolapse of vaginal walls in the vagina. Cervix is absent.Uterus is absent.           Musculoskeletal: Moves all extremeties. No edema.      Lymphadenopathy:     She has no cervical adenopathy.    Neurological: She is alert and oriented to person, place, and time.    Skin: Skin is warm and dry. No cyanosis. No pallor.    Psychiatric: She has a normal mood and affect. Her behavior is normal.     Assessment:     1. Endometrial cancer        Plan:       BRITTNEY on exam today.   RTC 6 months

## 2023-05-11 ENCOUNTER — HOSPITAL ENCOUNTER (OUTPATIENT)
Dept: RADIOLOGY | Facility: HOSPITAL | Age: 68
Discharge: HOME | End: 2023-05-11
Attending: FAMILY MEDICINE
Payer: MEDICARE

## 2023-05-11 DIAGNOSIS — Z12.31 VISIT FOR SCREENING MAMMOGRAM: ICD-10-CM

## 2023-05-11 PROCEDURE — 77067 SCR MAMMO BI INCL CAD: CPT | Mod: 26,,, | Performed by: RADIOLOGY

## 2023-05-11 PROCEDURE — 77063 MAMMO DIGITAL SCREENING BILAT WITH TOMO: ICD-10-PCS | Mod: 26,,, | Performed by: RADIOLOGY

## 2023-05-11 PROCEDURE — 77067 MAMMO DIGITAL SCREENING BILAT WITH TOMO: ICD-10-PCS | Mod: 26,,, | Performed by: RADIOLOGY

## 2023-05-11 PROCEDURE — 77063 BREAST TOMOSYNTHESIS BI: CPT | Mod: 26,,, | Performed by: RADIOLOGY

## 2023-05-11 PROCEDURE — 77067 SCR MAMMO BI INCL CAD: CPT | Mod: TC,PO

## 2023-05-31 DIAGNOSIS — E11.9 TYPE 2 DIABETES MELLITUS WITHOUT COMPLICATION: ICD-10-CM

## 2023-06-22 DIAGNOSIS — J45.40 MODERATE PERSISTENT ASTHMA WITHOUT COMPLICATION: ICD-10-CM

## 2023-06-22 NOTE — TELEPHONE ENCOUNTER
Spoke to patient and she was asking for an antibiotic for her cyst. Informed patient that she need a ov in order for her to get antibiotic. Patient decline and will treat her cyst.

## 2023-06-22 NOTE — TELEPHONE ENCOUNTER
Care Due:                  Date            Visit Type   Department     Provider  --------------------------------------------------------------------------------                                [facility]                              PRIMARY      MED/ INTERNAL  Last Visit: 05-      CARE ([facility])   MED/ PEDS      Ene Dorantes                              [facility]                              PRIMARY      MED/ INTERNAL  Next Visit: 07-      CARE ([facility])   MED/ PEDS      Ene Dorantes                                                            Last  Test          Frequency    Reason                     Performed    Due Date  --------------------------------------------------------------------------------    CMP.........  12 months..  hydroCHLOROthiazide......  Not Found    Overdue    [employer] Embedded Care Due Messages. Reference number: [id number].   6/22/2023 4:38:24 PM CDT

## 2023-06-22 NOTE — TELEPHONE ENCOUNTER
----- Message from Berna Bailey sent at 6/22/2023  4:26 PM CDT -----  Regarding: Refill Request  Type: RX Refill Request      Who Called: Self       Refill or New Rx: refill       RX Name and Strength: albuterol (PROVENTIL/VENTOLIN HFA) 90 mcg/actuation inhaler, also needs antibiotic for cysts       Preferred Pharmacy with phone number:    [facility]/pharmacy #[id number] - [place] - [address]  [address] [address]  Phone: [phone] Fax: [phone]          Would the patient rather a call back or a response via [facility]? Call       Best Call Back Number: [phone]

## 2023-06-23 RX ORDER — ALBUTEROL SULFATE 90 UG/1
AEROSOL, METERED RESPIRATORY (INHALATION)
Qty: 25.5 G | Refills: 0 | Status: SHIPPED | OUTPATIENT
Start: 2023-06-23

## 2023-06-23 NOTE — TELEPHONE ENCOUNTER
Provider Staff:  Action required for this patient     Please see care gap opportunities below in Care Due Message.    Thanks!  [facility]     Appointments      Date Provider   Last Visit   5/30/2022 Ene Boudreaux MD   Next Visit   7/7/2023 Ene Boudreaux MD      Refill Decision Note   Hilary Wilson  is requesting a refill authorization.  Brief Assessment and Rationale for Refill:  Approve     Medication Therapy Plan:         Comments:     Note composed:12:22 AM 06/23/2023

## 2023-06-29 ENCOUNTER — PATIENT OUTREACH (OUTPATIENT)
Dept: ADMINISTRATIVE | Facility: HOSPITAL | Age: 68
End: 2023-06-29
Payer: MEDICARE

## 2023-06-29 NOTE — PROGRESS NOTES

## 2023-07-06 ENCOUNTER — PATIENT MESSAGE (OUTPATIENT)
Dept: FAMILY MEDICINE | Facility: CLINIC | Age: 68
End: 2023-07-06
Payer: MEDICARE

## 2023-07-07 ENCOUNTER — OFFICE VISIT (OUTPATIENT)
Dept: FAMILY MEDICINE | Facility: CLINIC | Age: 68
End: 2023-07-07
Payer: MEDICARE

## 2023-07-07 ENCOUNTER — LAB VISIT (OUTPATIENT)
Dept: LAB | Facility: HOSPITAL | Age: 68
End: 2023-07-07
Attending: FAMILY MEDICINE
Payer: MEDICARE

## 2023-07-07 VITALS
SYSTOLIC BLOOD PRESSURE: 148 MMHG | WEIGHT: 293 LBS | DIASTOLIC BLOOD PRESSURE: 72 MMHG | OXYGEN SATURATION: 97 % | HEIGHT: 62 IN | TEMPERATURE: 98 F | BODY MASS INDEX: 53.92 KG/M2 | HEART RATE: 113 BPM

## 2023-07-07 DIAGNOSIS — L02.92 BOIL: ICD-10-CM

## 2023-07-07 DIAGNOSIS — E11.9 TYPE 2 DIABETES MELLITUS WITHOUT COMPLICATION: ICD-10-CM

## 2023-07-07 DIAGNOSIS — Z23 NEED FOR PNEUMOCOCCAL VACCINATION: ICD-10-CM

## 2023-07-07 DIAGNOSIS — I10 ESSENTIAL HYPERTENSION: Primary | ICD-10-CM

## 2023-07-07 DIAGNOSIS — E66.01 MORBID (SEVERE) OBESITY DUE TO EXCESS CALORIES: ICD-10-CM

## 2023-07-07 DIAGNOSIS — E11.9 TYPE 2 DIABETES MELLITUS WITHOUT COMPLICATION, WITHOUT LONG-TERM CURRENT USE OF INSULIN: ICD-10-CM

## 2023-07-07 LAB
ALBUMIN/CREAT UR: 8.3 UG/MG (ref 0–30)
CREAT UR-MCNC: 120 MG/DL (ref 15–325)
MICROALBUMIN UR DL<=1MG/L-MCNC: 10 UG/ML

## 2023-07-07 PROCEDURE — 3044F PR MOST RECENT HEMOGLOBIN A1C LEVEL <7.0%: ICD-10-PCS | Mod: CPTII,S$GLB,, | Performed by: FAMILY MEDICINE

## 2023-07-07 PROCEDURE — 1159F PR MEDICATION LIST DOCUMENTED IN MEDICAL RECORD: ICD-10-PCS | Mod: CPTII,S$GLB,, | Performed by: FAMILY MEDICINE

## 2023-07-07 PROCEDURE — 1101F PT FALLS ASSESS-DOCD LE1/YR: CPT | Mod: CPTII,S$GLB,, | Performed by: FAMILY MEDICINE

## 2023-07-07 PROCEDURE — 82570 ASSAY OF URINE CREATININE: CPT | Performed by: FAMILY MEDICINE

## 2023-07-07 PROCEDURE — 1101F PR PT FALLS ASSESS DOC 0-1 FALLS W/OUT INJ PAST YR: ICD-10-PCS | Mod: CPTII,S$GLB,, | Performed by: FAMILY MEDICINE

## 2023-07-07 PROCEDURE — 1126F AMNT PAIN NOTED NONE PRSNT: CPT | Mod: CPTII,S$GLB,, | Performed by: FAMILY MEDICINE

## 2023-07-07 PROCEDURE — 3008F PR BODY MASS INDEX (BMI) DOCUMENTED: ICD-10-PCS | Mod: CPTII,S$GLB,, | Performed by: FAMILY MEDICINE

## 2023-07-07 PROCEDURE — 3072F PR LOW RISK FOR RETINOPATHY: ICD-10-PCS | Mod: CPTII,S$GLB,, | Performed by: FAMILY MEDICINE

## 2023-07-07 PROCEDURE — 90677 PCV20 VACCINE IM: CPT | Mod: S$GLB,,, | Performed by: FAMILY MEDICINE

## 2023-07-07 PROCEDURE — 1160F PR REVIEW ALL MEDS BY PRESCRIBER/CLIN PHARMACIST DOCUMENTED: ICD-10-PCS | Mod: CPTII,S$GLB,, | Performed by: FAMILY MEDICINE

## 2023-07-07 PROCEDURE — 3288F PR FALLS RISK ASSESSMENT DOCUMENTED: ICD-10-PCS | Mod: CPTII,S$GLB,, | Performed by: FAMILY MEDICINE

## 2023-07-07 PROCEDURE — 3008F BODY MASS INDEX DOCD: CPT | Mod: CPTII,S$GLB,, | Performed by: FAMILY MEDICINE

## 2023-07-07 PROCEDURE — 90677 PNEUMOCOCCAL CONJUGATE VACCINE 20-VALENT: ICD-10-PCS | Mod: S$GLB,,, | Performed by: FAMILY MEDICINE

## 2023-07-07 PROCEDURE — 99999 PR PBB SHADOW E&M-EST. PATIENT-LVL III: ICD-10-PCS | Mod: PBBFAC,,, | Performed by: FAMILY MEDICINE

## 2023-07-07 PROCEDURE — 1126F PR PAIN SEVERITY QUANTIFIED, NO PAIN PRESENT: ICD-10-PCS | Mod: CPTII,S$GLB,, | Performed by: FAMILY MEDICINE

## 2023-07-07 PROCEDURE — 99999 PR PBB SHADOW E&M-EST. PATIENT-LVL III: CPT | Mod: PBBFAC,,, | Performed by: FAMILY MEDICINE

## 2023-07-07 PROCEDURE — 99214 PR OFFICE/OUTPT VISIT, EST, LEVL IV, 30-39 MIN: ICD-10-PCS | Mod: S$GLB,,, | Performed by: FAMILY MEDICINE

## 2023-07-07 PROCEDURE — 3078F DIAST BP <80 MM HG: CPT | Mod: CPTII,S$GLB,, | Performed by: FAMILY MEDICINE

## 2023-07-07 PROCEDURE — 1159F MED LIST DOCD IN RCRD: CPT | Mod: CPTII,S$GLB,, | Performed by: FAMILY MEDICINE

## 2023-07-07 PROCEDURE — 3044F HG A1C LEVEL LT 7.0%: CPT | Mod: CPTII,S$GLB,, | Performed by: FAMILY MEDICINE

## 2023-07-07 PROCEDURE — 3288F FALL RISK ASSESSMENT DOCD: CPT | Mod: CPTII,S$GLB,, | Performed by: FAMILY MEDICINE

## 2023-07-07 PROCEDURE — G0009 PNEUMOCOCCAL CONJUGATE VACCINE 20-VALENT: ICD-10-PCS | Mod: S$GLB,,, | Performed by: FAMILY MEDICINE

## 2023-07-07 PROCEDURE — 1160F RVW MEDS BY RX/DR IN RCRD: CPT | Mod: CPTII,S$GLB,, | Performed by: FAMILY MEDICINE

## 2023-07-07 PROCEDURE — 3077F PR MOST RECENT SYSTOLIC BLOOD PRESSURE >= 140 MM HG: ICD-10-PCS | Mod: CPTII,S$GLB,, | Performed by: FAMILY MEDICINE

## 2023-07-07 PROCEDURE — 3061F NEG MICROALBUMINURIA REV: CPT | Mod: CPTII,S$GLB,, | Performed by: FAMILY MEDICINE

## 2023-07-07 PROCEDURE — G0009 ADMIN PNEUMOCOCCAL VACCINE: HCPCS | Mod: S$GLB,,, | Performed by: FAMILY MEDICINE

## 2023-07-07 PROCEDURE — 3066F PR DOCUMENTATION OF TREATMENT FOR NEPHROPATHY: ICD-10-PCS | Mod: CPTII,S$GLB,, | Performed by: FAMILY MEDICINE

## 2023-07-07 PROCEDURE — 3066F NEPHROPATHY DOC TX: CPT | Mod: CPTII,S$GLB,, | Performed by: FAMILY MEDICINE

## 2023-07-07 PROCEDURE — 3061F PR NEG MICROALBUMINURIA RESULT DOCUMENTED/REVIEW: ICD-10-PCS | Mod: CPTII,S$GLB,, | Performed by: FAMILY MEDICINE

## 2023-07-07 PROCEDURE — 3078F PR MOST RECENT DIASTOLIC BLOOD PRESSURE < 80 MM HG: ICD-10-PCS | Mod: CPTII,S$GLB,, | Performed by: FAMILY MEDICINE

## 2023-07-07 PROCEDURE — 3077F SYST BP >= 140 MM HG: CPT | Mod: CPTII,S$GLB,, | Performed by: FAMILY MEDICINE

## 2023-07-07 PROCEDURE — 99214 OFFICE O/P EST MOD 30 MIN: CPT | Mod: S$GLB,,, | Performed by: FAMILY MEDICINE

## 2023-07-07 PROCEDURE — 3072F LOW RISK FOR RETINOPATHY: CPT | Mod: CPTII,S$GLB,, | Performed by: FAMILY MEDICINE

## 2023-07-07 RX ORDER — TIRZEPATIDE 2.5 MG/.5ML
2.5 INJECTION, SOLUTION SUBCUTANEOUS
Qty: 12 PEN | Refills: 0 | Status: SHIPPED | OUTPATIENT
Start: 2023-07-07 | End: 2023-10-05

## 2023-07-07 RX ORDER — AMOXICILLIN 500 MG/1
500 TABLET, FILM COATED ORAL EVERY 12 HOURS
Qty: 20 TABLET | Refills: 0 | Status: SHIPPED | OUTPATIENT
Start: 2023-07-07 | End: 2023-07-17

## 2023-07-07 NOTE — PROGRESS NOTES
Assessment & Plan  Problem List Items Addressed This Visit          Cardiac/Vascular    Essential hypertension - Primary    Current Assessment & Plan     Pt is currently stable on medication regimen.  Continue current therapy as scheduled.  Contact office with any questions about adjustments on medications.            Relevant Orders    Comprehensive Metabolic Panel    CBC Auto Differential    Hemoglobin A1C    Lipid Panel    TSH       Endocrine    Type 2 diabetes mellitus without complication, without long-term current use of insulin    Relevant Orders    Comprehensive Metabolic Panel    CBC Auto Differential    Hemoglobin A1C    Lipid Panel    TSH    Morbid (severe) obesity due to excess calories    Overview     -aware of need for drastic weight loss.  Endorse poor eating habits.  -follow up with pcp.           Current Assessment & Plan     The patient is asked to make an attempt to improve diet and exercise patterns to aid in medical management of this problem.            Other Visit Diagnoses       Boil        Relevant Medications    amoxicillin (AMOXIL) 500 MG Tab          Medically necessary to begin Mounjaro.  No previous benefit with metformin and high risk for hypoglycemia.      Health Maintenance reviewed.    Follow-up: No follow-ups on file.    ______________________________________________________________________    Chief Complaint  Chief Complaint   Patient presents with    Annual Exam       HPI  Hilary Wilson is a 68 y.o. female with multiple medical diagnoses as listed in the medical history and problem list that presents for annual exam.  Pt is known to me with last appointment 5/30/2022.    Patient denies any new symptoms including chest pain, SOB, blurry vision, N/V, diarrhea.  She did have COVID and used the Cpap to work through the symptoms.      Boils:  she has a few boils under her axilla and under to panus.  Nothing is draining at this time.   PAST MEDICAL HISTORY:  Past Medical  History:   Diagnosis Date    Asthma     Diabetes mellitus     Hyperlipidemia     Hypertension     JOSUÉ on CPAP     Uterine cancer     Vaginal delivery     x3       PAST SURGICAL HISTORY:  Past Surgical History:   Procedure Laterality Date    BREAST BIOPSY Left     COLONOSCOPY N/A 11/03/2016    Procedure: COLONOSCOPY;  Surgeon: Zhang Diez MD;  Location: [facility] ENDO;  Service: Endoscopy;  Laterality: N/A;    HYSTERECTOMY  03/2019    HYSTEROSCOPY WITH DILATION AND CURETTAGE OF UTERUS N/A 01/25/2019    Procedure: HYSTEROSCOPY, WITH DILATION AND CURETTAGE OF UTERUS;  Surgeon: Elías Nolasco MD;  Location: [facility] OR;  Service: OB/GYN;  Laterality: N/A;  RN PRE OP 1-22-19---BMI--61.3  TUCLEAR MYOSURE TAHIRA PRECHTER [phone] SPOKE TO HER ON 1-23-19 @ 12:23PM    mass removal      OOPHORECTOMY  03/2019    MA REMOVAL OF OVARY/TUBE(S)      ROBOT-ASSISTED LAPAROSCOPIC ABDOMINAL HYSTERECTOMY USING DA MALCOLM XI N/A 03/19/2019    Procedure: XI ROBOTIC HYSTERECTOMY;  Surgeon: Stiven Geronimo MD;  Location: [facility] OR [address] [place] [address];  Service: OB/GYN;  Laterality: N/A;  POSS DIFF INTUBATION-GLIDESCOPE NEEDED  Wt 341 lbs  T & S am of surgery    ROBOT-ASSISTED LAPAROSCOPIC SALPINGO-OOPHORECTOMY USING DA MALCOLM XI Bilateral 03/19/2019    Procedure: XI ROBOTIC SALPINGO-OOPHORECTOMY;  Surgeon: Stiven Geronimo MD;  Location: [facility] OR [address] [place] [address];  Service: OB/GYN;  Laterality: Bilateral;    TUBAL LIGATION         SOCIAL HISTORY:  Social History     Socioeconomic History    Marital status:    Tobacco Use    Smoking status: Never    Smokeless tobacco: Never   Substance and Sexual Activity    Alcohol use: No    Drug use: No    Sexual activity: Yes     Partners: Male       FAMILY HISTORY:  Family History   Problem Relation Age of Onset    Hypertension Mother     Kidney disease Mother     Diabetes Father     Stroke Father     Heart disease Sister 49       ALLERGIES AND MEDICATIONS: updated and reviewed.  Review of patient's allergies  "indicates:  No Known Allergies  Current Outpatient Medications   Medication Sig Dispense Refill    albuterol (PROVENTIL/VENTOLIN HFA) 90 mcg/actuation inhaler INHALE 2 PUFFS ORALLY EVERY SIX HOURS AS NEEDED FOR WHEEZING 25.5 g 0    hydroCHLOROthiazide (HYDRODIURIL) 25 MG tablet Take 1 tablet (25 mg total) by mouth once daily. 90 tablet 0    amoxicillin (AMOXIL) 500 MG Tab Take 1 tablet (500 mg total) by mouth every 12 (twelve) hours. for 10 days 20 tablet 0     No current facility-administered medications for this visit.         ROS  Review of Systems   Constitutional:  Negative for activity change, appetite change, fatigue, fever and unexpected weight change.   HENT: Negative.  Negative for ear discharge, ear pain, rhinorrhea and sore throat.    Eyes: Negative.    Respiratory:  Negative for apnea, cough, chest tightness, shortness of breath and wheezing.    Cardiovascular:  Negative for chest pain, palpitations and leg swelling.   Gastrointestinal:  Negative for abdominal distention, abdominal pain, constipation, diarrhea and vomiting.   Endocrine: Negative for cold intolerance, heat intolerance, polydipsia and polyuria.   Genitourinary:  Negative for decreased urine volume and urgency.   Musculoskeletal: Negative.    Skin:  Positive for rash.   Neurological:  Negative for dizziness and headaches.   Hematological:  Does not bruise/bleed easily.   Psychiatric/Behavioral:  Negative for agitation, sleep disturbance and suicidal ideas.          Physical Exam  Vitals:    07/07/23 0831   BP: (!) 148/72   BP Location: Right arm   Patient Position: Sitting   BP Method: Large (Manual)   Pulse: (!) 113   Temp: 98 °F (36.7 °C)   TempSrc: Oral   SpO2: 97%   Weight: (!) 157.9 kg (348 lb 1.7 oz)   Height: 5' 2" (1.575 m)    Body mass index is 63.67 kg/m².  Weight: (!) 157.9 kg (348 lb 1.7 oz)   Height: 5' 2" (157.5 cm)   Physical Exam  Vitals reviewed.   Constitutional:       Appearance: Normal appearance. She is well-developed. "   HENT:      Head: Normocephalic and atraumatic.      Right Ear: External ear normal.      Left Ear: External ear normal.      Nose: Nose normal.      Mouth/Throat:      Mouth: Mucous membranes are moist.      Pharynx: Oropharynx is clear.   Eyes:      Extraocular Movements: Extraocular movements intact.      Conjunctiva/sclera: Conjunctivae normal.      Pupils: Pupils are equal, round, and reactive to light.   Cardiovascular:      Rate and Rhythm: Normal rate and regular rhythm.      Heart sounds: Normal heart sounds.   Pulmonary:      Effort: Pulmonary effort is normal.      Breath sounds: Normal breath sounds.   Skin:     General: Skin is warm and dry.   Neurological:      Mental Status: She is alert and oriented to person, place, and time.         Health Maintenance         Date Due Completion Date    Pneumococcal Vaccines (Age 65+) (1 - PCV) Never done ---    Foot Exam Never done ---    TETANUS VACCINE Never done ---    Shingles Vaccine (1 of 2) Never done ---    Low Dose Statin Never done ---    Hemoglobin A1c 11/11/2021 5/11/2021    COVID-19 Vaccine (4 - Moderna series) 02/17/2022 12/23/2021    Diabetes Urine Screening 05/03/2022 5/3/2021    Lipid Panel 05/11/2022 5/11/2021    Influenza Vaccine (1) 09/01/2023 ---    Eye Exam 11/08/2023 11/8/2022    Mammogram 05/11/2024 5/11/2023    DEXA Scan 07/29/2024 7/29/2021    Colorectal Cancer Screening 01/28/2027 1/28/2022    Override on 5/7/2010: Done (Dr. Roberts, Elena DORADO)                Patient note was created using [employer].  Any errors in syntax or even information may not have been identified and edited on initial review prior to signing this note.

## 2023-09-01 ENCOUNTER — PATIENT MESSAGE (OUTPATIENT)
Dept: FAMILY MEDICINE | Facility: CLINIC | Age: 68
End: 2023-09-01
Payer: MEDICARE

## 2023-09-13 ENCOUNTER — PATIENT MESSAGE (OUTPATIENT)
Dept: ADMINISTRATIVE | Facility: HOSPITAL | Age: 68
End: 2023-09-13
Payer: MEDICARE

## 2023-10-30 ENCOUNTER — OFFICE VISIT (OUTPATIENT)
Dept: GYNECOLOGIC ONCOLOGY | Facility: CLINIC | Age: 68
End: 2023-10-30
Payer: MEDICARE

## 2023-10-30 ENCOUNTER — PATIENT MESSAGE (OUTPATIENT)
Dept: FAMILY MEDICINE | Facility: CLINIC | Age: 68
End: 2023-10-30
Payer: MEDICARE

## 2023-10-30 VITALS
DIASTOLIC BLOOD PRESSURE: 73 MMHG | SYSTOLIC BLOOD PRESSURE: 163 MMHG | HEART RATE: 98 BPM | WEIGHT: 293 LBS | BODY MASS INDEX: 53.92 KG/M2 | HEIGHT: 62 IN

## 2023-10-30 DIAGNOSIS — C54.1 ENDOMETRIAL CANCER: Primary | ICD-10-CM

## 2023-10-30 PROCEDURE — 3072F LOW RISK FOR RETINOPATHY: CPT | Mod: CPTII,S$GLB,, | Performed by: OBSTETRICS & GYNECOLOGY

## 2023-10-30 PROCEDURE — 3066F NEPHROPATHY DOC TX: CPT | Mod: CPTII,S$GLB,, | Performed by: OBSTETRICS & GYNECOLOGY

## 2023-10-30 PROCEDURE — 1126F AMNT PAIN NOTED NONE PRSNT: CPT | Mod: CPTII,S$GLB,, | Performed by: OBSTETRICS & GYNECOLOGY

## 2023-10-30 PROCEDURE — 3008F PR BODY MASS INDEX (BMI) DOCUMENTED: ICD-10-PCS | Mod: CPTII,S$GLB,, | Performed by: OBSTETRICS & GYNECOLOGY

## 2023-10-30 PROCEDURE — 1126F PR PAIN SEVERITY QUANTIFIED, NO PAIN PRESENT: ICD-10-PCS | Mod: CPTII,S$GLB,, | Performed by: OBSTETRICS & GYNECOLOGY

## 2023-10-30 PROCEDURE — 3078F DIAST BP <80 MM HG: CPT | Mod: CPTII,S$GLB,, | Performed by: OBSTETRICS & GYNECOLOGY

## 2023-10-30 PROCEDURE — 3066F PR DOCUMENTATION OF TREATMENT FOR NEPHROPATHY: ICD-10-PCS | Mod: CPTII,S$GLB,, | Performed by: OBSTETRICS & GYNECOLOGY

## 2023-10-30 PROCEDURE — 3044F HG A1C LEVEL LT 7.0%: CPT | Mod: CPTII,S$GLB,, | Performed by: OBSTETRICS & GYNECOLOGY

## 2023-10-30 PROCEDURE — 1159F MED LIST DOCD IN RCRD: CPT | Mod: CPTII,S$GLB,, | Performed by: OBSTETRICS & GYNECOLOGY

## 2023-10-30 PROCEDURE — 3072F PR LOW RISK FOR RETINOPATHY: ICD-10-PCS | Mod: CPTII,S$GLB,, | Performed by: OBSTETRICS & GYNECOLOGY

## 2023-10-30 PROCEDURE — 3044F PR MOST RECENT HEMOGLOBIN A1C LEVEL <7.0%: ICD-10-PCS | Mod: CPTII,S$GLB,, | Performed by: OBSTETRICS & GYNECOLOGY

## 2023-10-30 PROCEDURE — 3078F PR MOST RECENT DIASTOLIC BLOOD PRESSURE < 80 MM HG: ICD-10-PCS | Mod: CPTII,S$GLB,, | Performed by: OBSTETRICS & GYNECOLOGY

## 2023-10-30 PROCEDURE — 99999 PR PBB SHADOW E&M-EST. PATIENT-LVL III: CPT | Mod: PBBFAC,,, | Performed by: OBSTETRICS & GYNECOLOGY

## 2023-10-30 PROCEDURE — 99214 PR OFFICE/OUTPT VISIT, EST, LEVL IV, 30-39 MIN: ICD-10-PCS | Mod: S$GLB,,, | Performed by: OBSTETRICS & GYNECOLOGY

## 2023-10-30 PROCEDURE — 1160F RVW MEDS BY RX/DR IN RCRD: CPT | Mod: CPTII,S$GLB,, | Performed by: OBSTETRICS & GYNECOLOGY

## 2023-10-30 PROCEDURE — 99999 PR PBB SHADOW E&M-EST. PATIENT-LVL III: ICD-10-PCS | Mod: PBBFAC,,, | Performed by: OBSTETRICS & GYNECOLOGY

## 2023-10-30 PROCEDURE — 3061F NEG MICROALBUMINURIA REV: CPT | Mod: CPTII,S$GLB,, | Performed by: OBSTETRICS & GYNECOLOGY

## 2023-10-30 PROCEDURE — 3008F BODY MASS INDEX DOCD: CPT | Mod: CPTII,S$GLB,, | Performed by: OBSTETRICS & GYNECOLOGY

## 2023-10-30 PROCEDURE — 1160F PR REVIEW ALL MEDS BY PRESCRIBER/CLIN PHARMACIST DOCUMENTED: ICD-10-PCS | Mod: CPTII,S$GLB,, | Performed by: OBSTETRICS & GYNECOLOGY

## 2023-10-30 PROCEDURE — 1159F PR MEDICATION LIST DOCUMENTED IN MEDICAL RECORD: ICD-10-PCS | Mod: CPTII,S$GLB,, | Performed by: OBSTETRICS & GYNECOLOGY

## 2023-10-30 PROCEDURE — 99214 OFFICE O/P EST MOD 30 MIN: CPT | Mod: S$GLB,,, | Performed by: OBSTETRICS & GYNECOLOGY

## 2023-10-30 PROCEDURE — 3061F PR NEG MICROALBUMINURIA RESULT DOCUMENTED/REVIEW: ICD-10-PCS | Mod: CPTII,S$GLB,, | Performed by: OBSTETRICS & GYNECOLOGY

## 2023-10-30 PROCEDURE — 3077F PR MOST RECENT SYSTOLIC BLOOD PRESSURE >= 140 MM HG: ICD-10-PCS | Mod: CPTII,S$GLB,, | Performed by: OBSTETRICS & GYNECOLOGY

## 2023-10-30 PROCEDURE — 3077F SYST BP >= 140 MM HG: CPT | Mod: CPTII,S$GLB,, | Performed by: OBSTETRICS & GYNECOLOGY

## 2023-10-30 NOTE — PROGRESS NOTES
Subjective:      Patient ID: Hilary Wilson is a 68 y.o. female.    Chief Complaint: Endometrial Cancer      Treatment History  Clinical Stage IAG2 endometrial cancer (mainly in polyp, 3mm of 27 mm invasion)  RALH/BSO/Minilap for uterine extraction  Morbid obesity precluding staging    Follow-up  Pertinent negatives include no abdominal pain, arthralgias, chest pain, chills, coughing, fatigue, fever, nausea, numbness, rash, sore throat, vomiting or weakness.     Here today for continued surveillance. Denies VB, F/C, N/V.  No new symptoms per patient.  Reports she has been doing well.    Review of Systems   Constitutional:  Negative for activity change, appetite change, chills, fatigue and fever.   HENT:  Negative for hearing loss, mouth sores, nosebleeds, sore throat and tinnitus.    Eyes:  Negative for visual disturbance.   Respiratory:  Negative for cough, chest tightness, shortness of breath and wheezing.    Cardiovascular:  Negative for chest pain and leg swelling.   Gastrointestinal:  Negative for abdominal distention, abdominal pain, blood in stool, constipation, diarrhea, nausea and vomiting.   Genitourinary:  Negative for dysuria, flank pain, frequency, hematuria, pelvic pain, vaginal bleeding, vaginal discharge and vaginal pain.   Musculoskeletal:  Negative for arthralgias and back pain.   Skin:  Negative for rash.   Neurological:  Negative for dizziness, seizures, syncope, weakness and numbness.   Hematological:  Does not bruise/bleed easily.   Psychiatric/Behavioral:  Negative for confusion and sleep disturbance. The patient is not nervous/anxious.      LMP 05/23/2014 Comment: having bleeding  DUB    Objective:   Physical Exam:   Constitutional: She is oriented to person, place, and time. She appears well-developed and well-nourished. No distress.    HENT:   Head: Normocephalic and atraumatic.    Eyes: No scleral icterus.     Cardiovascular:       Exam reveals no cyanosis and no edema.         Pulmonary/Chest: Effort normal. No respiratory distress. She exhibits no tenderness.        Abdominal: Soft. She exhibits no distension, no fluid wave and no mass. There is no abdominal tenderness. There is no rebound and no guarding. No hernia.     Genitourinary:    Vagina normal.      Pelvic exam was performed with patient supine.   There is no rash, tenderness or lesion on the right labia. There is no rash, tenderness or lesion on the left labia. Right adnexum displays no mass, no tenderness and no fullness. Left adnexum displays no mass, no tenderness and no fullness. Vaginal cuff normal.  No  no vaginal discharge, bleeding (cuff without lesion) or unspecified prolapse of vaginal walls in the vagina. Cervix is absent.Uterus is absent.           Musculoskeletal: Moves all extremeties. No edema.      Lymphadenopathy:     She has no cervical adenopathy.    Neurological: She is alert and oriented to person, place, and time.    Skin: Skin is warm and dry. No cyanosis. No pallor.    Psychiatric: She has a normal mood and affect. Her behavior is normal.       Assessment:     1. Endometrial cancer        Plan:       BRITTNEY on exam today.   RTC 6 months

## 2023-11-06 ENCOUNTER — PATIENT MESSAGE (OUTPATIENT)
Dept: ADMINISTRATIVE | Facility: HOSPITAL | Age: 68
End: 2023-11-06
Payer: MEDICARE

## 2023-11-20 ENCOUNTER — TELEPHONE (OUTPATIENT)
Dept: FAMILY MEDICINE | Facility: CLINIC | Age: 68
End: 2023-11-20
Payer: MEDICARE

## 2023-11-20 NOTE — TELEPHONE ENCOUNTER
----- Message from Berna Bailey sent at 11/20/2023 12:33 PM CST -----  Regarding: patient call back  Type: Patient Call Back    Who called: Self     What is the request in detail: Asked for a call back to get schedule for a bariatric follow up     Can the clinic reply by [facility]? No     Would the patient rather a call back or a response via [facility]? Call     Best call back number: [phone]      Additional Information:

## 2023-11-20 NOTE — TELEPHONE ENCOUNTER
----- Message from Dread Mandel sent at 11/20/2023  3:07 PM CST -----  Regarding: Self [phone]  Type:  Patient Returning Call    Who Called:  Self     Who Left Message for Patient:  Tracie Griffith LPN    Does the patient know what this is regarding?: yes     Would the patient rather a call back or a response via [facility]?  Call back     Best Call Back Number: [phone]     Additional Information:     Thank you.

## 2024-01-24 DIAGNOSIS — E11.9 TYPE 2 DIABETES MELLITUS WITHOUT COMPLICATION: ICD-10-CM

## 2024-03-04 ENCOUNTER — PATIENT MESSAGE (OUTPATIENT)
Dept: ADMINISTRATIVE | Facility: HOSPITAL | Age: 69
End: 2024-03-04
Payer: MEDICARE

## 2024-03-19 ENCOUNTER — OFFICE VISIT (OUTPATIENT)
Dept: FAMILY MEDICINE | Facility: CLINIC | Age: 69
End: 2024-03-19
Payer: MEDICARE

## 2024-03-19 DIAGNOSIS — R26.81 GAIT INSTABILITY: ICD-10-CM

## 2024-03-19 DIAGNOSIS — G47.33 OSA ON CPAP: ICD-10-CM

## 2024-03-19 DIAGNOSIS — E11.65 TYPE 2 DIABETES MELLITUS WITH HYPERGLYCEMIA, WITHOUT LONG-TERM CURRENT USE OF INSULIN: ICD-10-CM

## 2024-03-19 DIAGNOSIS — I10 ESSENTIAL HYPERTENSION: ICD-10-CM

## 2024-03-19 DIAGNOSIS — Z00.00 ANNUAL PHYSICAL EXAM: Primary | ICD-10-CM

## 2024-03-19 DIAGNOSIS — L02.92 BOILS: ICD-10-CM

## 2024-03-19 DIAGNOSIS — E66.01 MORBID (SEVERE) OBESITY DUE TO EXCESS CALORIES: ICD-10-CM

## 2024-03-19 PROCEDURE — 99397 PER PM REEVAL EST PAT 65+ YR: CPT | Mod: S$GLB,,, | Performed by: FAMILY MEDICINE

## 2024-03-19 PROCEDURE — 99999 PR PBB SHADOW E&M-EST. PATIENT-LVL IV: CPT | Mod: PBBFAC,,, | Performed by: FAMILY MEDICINE

## 2024-03-19 RX ORDER — MUPIROCIN 20 MG/G
OINTMENT TOPICAL DAILY
Qty: 30 G | Refills: 3 | Status: SHIPPED | OUTPATIENT
Start: 2024-03-19

## 2024-03-19 RX ORDER — TIRZEPATIDE 2.5 MG/.5ML
INJECTION, SOLUTION SUBCUTANEOUS
COMMUNITY
Start: 2023-12-26 | End: 2024-03-19

## 2024-03-19 RX ORDER — TIRZEPATIDE 5 MG/.5ML
5 INJECTION, SOLUTION SUBCUTANEOUS
Qty: 4 PEN | Refills: 0 | Status: SHIPPED | OUTPATIENT
Start: 2024-03-19 | End: 2024-04-18

## 2024-03-19 NOTE — PROGRESS NOTES
Assessment & Plan  Problem List Items Addressed This Visit          Cardiac/Vascular    Essential hypertension    Current Assessment & Plan     Patient is stable.  Assess and addressed all modifiable risk factors.  Continue with appropriate management to prevent complications.           Relevant Orders    Comprehensive Metabolic Panel    CBC Auto Differential    Hemoglobin A1C    Lipid Panel    TSH       Endocrine    Type 2 diabetes mellitus with hyperglycemia, without long-term current use of insulin    Relevant Medications    tirzepatide (MOUNJARO) 5 mg/0.5 mL PnIj    tirzepatide 7.5 mg/0.5 mL PnIj (Start on 4/18/2024)    tirzepatide 10 mg/0.5 mL PnIj (Start on 5/18/2024)    Other Relevant Orders    Comprehensive Metabolic Panel    CBC Auto Differential    Hemoglobin A1C    Lipid Panel    TSH    Morbid (severe) obesity due to excess calories    Overview     -aware of need for drastic weight loss.  Endorse poor eating habits.  -follow up with pcp.              Other    JOSUÉ on CPAP    Overview     -ahi of 49  -current with [employer] Apap.  Doing well well with good compliance.97%>4 hours.  Residual ahi of 0.4.  Patient is benefiting from apap.                Other Visit Diagnoses       Annual physical exam    -  Primary    Relevant Orders    Comprehensive Metabolic Panel    CBC Auto Differential    Hemoglobin A1C    Lipid Panel    TSH    Boils        Relevant Medications    mupirocin (BACTROBAN) 2 % ointment    Gait instability        Relevant Orders    Ambulatory referral/consult to Physical/Occupational Therapy              Health Maintenance reviewed, .    Follow-up: No follow-ups on file.    ______________________________________________________________________    Chief Complaint  Chief Complaint   Patient presents with    Annual Exam       HPI  Hilary Wilson is a 68 y.o. female with multiple medical diagnoses as listed in the medical history and problem list that presents for annual exam.  Pt is known to  me with last appointment 7/7/2023.    Patient denies any new symptoms including chest pain, SOB, blurry vision, N/V, diarrhea.  She would like to continue with her current medication to assist with blood sugar control.  She denies any current issues with her medication regimen.  She is tolerating it without much issue.  Patient states that blood pressure is otherwise stable.  Slightly elevated in the office today.  Patient indicates lower blood pressure readings in the past.     PAST MEDICAL HISTORY:  Past Medical History:   Diagnosis Date    Asthma     Diabetes mellitus     Hyperlipidemia     Hypertension     JOSUÉ on CPAP     Uterine cancer     Vaginal delivery     x3       PAST SURGICAL HISTORY:  Past Surgical History:   Procedure Laterality Date    BREAST BIOPSY Left     COLONOSCOPY N/A 11/03/2016    Procedure: COLONOSCOPY;  Surgeon: Zhang Diez MD;  Location: [facility] ENDO;  Service: Endoscopy;  Laterality: N/A;    HYSTERECTOMY  03/2019    HYSTEROSCOPY WITH DILATION AND CURETTAGE OF UTERUS N/A 01/25/2019    Procedure: HYSTEROSCOPY, WITH DILATION AND CURETTAGE OF UTERUS;  Surgeon: Elías Nolasco MD;  Location: [facility] OR;  Service: OB/GYN;  Laterality: N/A;  RN PRE OP 1-22-19---BMI--61.3  TUCLEAR MYOSURE TAHIRA PRECHTER [phone] SPOKE TO HER ON 1-23-19 @ 12:23PM    mass removal      OOPHORECTOMY  03/2019    DC REMOVAL OF OVARY/TUBE(S)      ROBOT-ASSISTED LAPAROSCOPIC ABDOMINAL HYSTERECTOMY USING DA MALCOLM XI N/A 03/19/2019    Procedure: XI ROBOTIC HYSTERECTOMY;  Surgeon: Stiven Geronimo MD;  Location: [facility] OR [address] [place] [address];  Service: OB/GYN;  Laterality: N/A;  POSS DIFF INTUBATION-GLIDESCOPE NEEDED  Wt 341 lbs  T & S am of surgery    ROBOT-ASSISTED LAPAROSCOPIC SALPINGO-OOPHORECTOMY USING DA MALCOLM XI Bilateral 03/19/2019    Procedure: XI ROBOTIC SALPINGO-OOPHORECTOMY;  Surgeon: Stiven Geronimo MD;  [facility];  Service: OB/GYN;  Laterality: Bilateral;    TUBAL LIGATION         SOCIAL  HISTORY:  Social History     Socioeconomic History    Marital status:    Tobacco Use    Smoking status: Never    Smokeless tobacco: Never   Substance and Sexual Activity    Alcohol use: No    Drug use: No    Sexual activity: Not Currently     Partners: Male     Social Determinants of Health     Financial Resource Strain: Low Risk  (3/15/2024)    Overall Financial Resource Strain (CARDIA)     Difficulty of Paying Living Expenses: Not hard at all   Food Insecurity: No Food Insecurity (3/15/2024)    Hunger Vital Sign     Worried About Running Out of Food in the Last Year: Never true     Ran Out of Food in the Last Year: Never true   Transportation Needs: No Transportation Needs (3/15/2024)    PRAPARE - Transportation     Lack of Transportation (Medical): No     Lack of Transportation (Non-Medical): No   Physical Activity: Unknown (3/15/2024)    Exercise Vital Sign     Days of Exercise per Week: 1 day   Stress: No Stress Concern Present (3/15/2024)    [employer] [place] [employer] - Occupational Stress Questionnaire     Feeling of Stress : Not at all   Social Connections: Unknown (3/15/2024)    Social Connection and Isolation Panel [NHANES]     Frequency of Communication with Friends and Family: More than three times a week     Frequency of Social Gatherings with Friends and Family: Twice a week     Active Member of Clubs or Organizations: Yes     Attends Club or Organization Meetings: 1 to 4 times per year     Marital Status:    Housing Stability: Unknown (3/15/2024)    Housing Stability Vital Sign     Unable to Pay for Housing in the Last Year: No     Unstable Housing in the Last Year: No       FAMILY HISTORY:  Family History   Problem Relation Age of Onset    Hypertension Mother     Kidney disease Mother     Diabetes Father     Stroke Father     Heart disease Sister 49       ALLERGIES AND MEDICATIONS: updated and reviewed.  Review of patient's allergies indicates:  No Known Allergies  Current  "Outpatient Medications   Medication Sig Dispense Refill    albuterol (PROVENTIL/VENTOLIN HFA) 90 mcg/actuation inhaler INHALE 2 PUFFS ORALLY EVERY SIX HOURS AS NEEDED FOR WHEEZING 25.5 g 0    hydroCHLOROthiazide (HYDRODIURIL) 25 MG tablet TAKE 1 TABLET BY MOUTH EVERY DAY 90 tablet 3    mupirocin (BACTROBAN) 2 % ointment Apply topically once daily. 30 g 3    tirzepatide (MOUNJARO) 5 mg/0.5 mL PnIj Inject 5 mg into the skin every 7 days. 4 Pen 0    [START ON 5/18/2024] tirzepatide 10 mg/0.5 mL PnIj Inject 10 mg into the skin every 7 days. 12 Pen 0    [START ON 4/18/2024] tirzepatide 7.5 mg/0.5 mL PnIj Inject 7.5 mg into the skin every 7 days. 4 Pen 0     No current facility-administered medications for this visit.         ROS  Review of Systems   Constitutional:  Negative for activity change, appetite change, fatigue, fever and unexpected weight change.   HENT: Negative.  Negative for ear discharge, ear pain, rhinorrhea and sore throat.    Eyes: Negative.    Respiratory:  Negative for apnea, cough, chest tightness, shortness of breath and wheezing.    Cardiovascular:  Negative for chest pain, palpitations and leg swelling.   Gastrointestinal:  Negative for abdominal distention, abdominal pain, constipation, diarrhea and vomiting.   Endocrine: Negative for cold intolerance, heat intolerance, polydipsia and polyuria.   Genitourinary:  Negative for decreased urine volume and urgency.   Musculoskeletal: Negative.    Skin:  Negative for rash.   Neurological:  Negative for dizziness and headaches.   Hematological:  Does not bruise/bleed easily.   Psychiatric/Behavioral:  Negative for agitation, sleep disturbance and suicidal ideas.            Physical Exam  Vitals:    03/19/24 0827   BP: (!) 142/84   Pulse: 92   Temp: 97.8 °F (36.6 °C)   TempSrc: Oral   SpO2: 99%   Weight: (!) 154.9 kg (341 lb 7.9 oz)   Height: 5' 2" (1.575 m)    Body mass index is 62.46 kg/m².  Weight: (!) 154.9 kg (341 lb 7.9 oz)   Height: 5' 2" (157.5 " cm)   Physical Exam  Vitals reviewed.   Constitutional:       Appearance: Normal appearance. She is well-developed.   HENT:      Head: Normocephalic and atraumatic.      Right Ear: External ear normal.      Left Ear: External ear normal.      Nose: Nose normal.      Mouth/Throat:      Mouth: Mucous membranes are moist.      Pharynx: Oropharynx is clear.   Eyes:      Extraocular Movements: Extraocular movements intact.      Conjunctiva/sclera: Conjunctivae normal.      Pupils: Pupils are equal, round, and reactive to light.   Cardiovascular:      Rate and Rhythm: Normal rate and regular rhythm.      Heart sounds: Normal heart sounds.   Pulmonary:      Effort: Pulmonary effort is normal.      Breath sounds: Normal breath sounds.   Skin:     General: Skin is warm and dry.   Neurological:      Mental Status: She is alert and oriented to person, place, and time.           Health Maintenance         Date Due Completion Date    Foot Exam Never done ---    TETANUS VACCINE Never done ---    Shingles Vaccine (1 of 2) Never done ---    Low Dose Statin Never done ---    RSV Vaccine (Age 60+ and Pregnant patients) (1 - 1-dose 60+ series) Never done ---    Influenza Vaccine (1) Never done ---    COVID-19 Vaccine (4 - 2023-24 season) 09/01/2023 12/23/2021    Eye Exam 11/08/2023 11/8/2022    Hemoglobin A1c 01/07/2024 7/7/2023    Mammogram 05/11/2024 5/11/2023    Diabetes Urine Screening 07/07/2024 7/7/2023    Lipid Panel 07/07/2024 7/7/2023    DEXA Scan 07/29/2024 7/29/2021    Colorectal Cancer Screening 01/28/2027 1/28/2022    Override on 5/7/2010: Done (Dr. Roberts, Elena )                Patient note was created using [employer].  Any errors in syntax or even information may not have been identified and edited on initial review prior to signing this note.

## 2024-03-20 ENCOUNTER — TELEPHONE (OUTPATIENT)
Dept: FAMILY MEDICINE | Facility: CLINIC | Age: 69
End: 2024-03-20
Payer: MEDICARE

## 2024-03-20 DIAGNOSIS — R26.81 GAIT INSTABILITY: Primary | ICD-10-CM

## 2024-03-20 NOTE — TELEPHONE ENCOUNTER
----- Message from Scott Garcia sent at 3/20/2024 12:30 PM CDT -----  Regarding: Hilary  Type:Patient Callback     Who called: Pinkyjersey     What is the request in detail: Pt stated that the she would like to do the water exercise therapy. Please put in the orders for her and let her know once the orders are in to schedule the appointment.     Can the clinic reply by [facility]? Yes     Would the patient rather a call back or a response via [facility]? Callback     Best call back number: [phone]      Additional Information:

## 2024-03-22 VITALS
OXYGEN SATURATION: 99 % | HEART RATE: 92 BPM | TEMPERATURE: 98 F | WEIGHT: 293 LBS | HEIGHT: 62 IN | DIASTOLIC BLOOD PRESSURE: 74 MMHG | SYSTOLIC BLOOD PRESSURE: 138 MMHG | BODY MASS INDEX: 53.92 KG/M2

## 2024-03-26 ENCOUNTER — CLINICAL SUPPORT (OUTPATIENT)
Dept: REHABILITATION | Facility: HOSPITAL | Age: 69
End: 2024-03-26
Attending: FAMILY MEDICINE
Payer: MEDICARE

## 2024-03-26 DIAGNOSIS — Z78.9 DECREASED ACTIVITIES OF DAILY LIVING (ADL): ICD-10-CM

## 2024-03-26 DIAGNOSIS — R26.81 GAIT INSTABILITY: ICD-10-CM

## 2024-03-26 DIAGNOSIS — R06.09 DYSPNEA ON EXERTION: Primary | ICD-10-CM

## 2024-03-26 PROCEDURE — 97165 OT EVAL LOW COMPLEX 30 MIN: CPT

## 2024-03-26 NOTE — PLAN OF CARE
"[facility]   Occupational Therapy Initial Evaluation     Encounter Date: 3/26/2024   Name: Hilary Wilson  Clinic Number: [id number]    Therapy Diagnosis:   Encounter Diagnoses   Name Primary?    Gait instability     Dyspnea on exertion Yes    Decreased activities of daily living (ADL)      Referring Provider: Ene Boudreaux MD    Physician Orders: Eval & Treat; Aquatic Therapy: knees, back.  Medical Diagnosis from Referral: R26.81 (ICD-10-CM) - Gait instability  Evaluation date: 3/26/2024  Authorization Period Expiration: 12/31/20241  Plan of Care Expiration: 5/24/2024  Visit # / Visits authorized: 1/ 1    Precautions: Standard, Diabetes, and Fall    Time In: 09:04  Time Out: 09:55  Total Appointment Time (timed & untimed codes): 51 minutes    SUBJECTIVE   History of current condition, based on chart review and Hilary's report: the pain isn't an everyday thing. When I take a tylenol, I might be OK for a few days; only takes it once a week. 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 Reports legs to be tight. She reports not being as engaged in the community as she has difficulty getting out of the house, since she has 9 steps.     Hilary Wilson states: "I got stuck during the pandemic and I have been stuck ever since. I am going on a cruise soon, so I want to be able to move better. I picked the pool, because I have a pool in my house in [place] and that's where I will be in the summer"    Prior Level of Function: Activities patient can no longer perform/has great difficulty with include: standing tolerance, difficulty moving about in [place].     Occupational Profile  Home access: lives with , one story home with 9 steps to enter and (B) hand rails  Family dynamic: both retired. She is a . 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 Children lives in [facility]. " She might babysit the grand kids (4, new born).  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   Driving status: active  Current Self Care Routine: prayer, reading the bible. Poor eating habits.   Current Exercise: walking around the house. Has stretch bands she uses for UE.  DME: SPC at times. Recently bought and not adjusted for her height.  Disturbed sleep: No, CPAP use for JOSUÉ.  Mental health: reports no concerns  Prior Therapy: none  Comfort level with pool: high, but not able to swim.    Patient Specific Activities based on Personal goals:  Activity  (To be rated first session after eval)    Performance Satisfaction   Volunteer with events for senior citizens     2.  Cooking     3.  Carrying groceries up the stair into the home.     4.  Standing tolerance at [employer]     5.  Getting on/off the floor to clean underside of table, or when playing with grand kids.          Total Score     Scorin-10; Unable/Unsatisfied - Able/Satisfied    Pain:      Pain Related Behaviors Observed: no; currently rating pain as 2/10.  Functional Pain Scale Rating 0-10: within the last 24 hours  Date 3/26/2024   Average 4/10   Worst 4/10   Best 2/10   Composite score 3.33/10   [MARIA ELENA  is 1 point or 15-20% change in interference composite score (Dineshorkin et al. 2008)]    Location: knees, back.  Description: Aching  Functional Exacerbations: Standing prolonged time.  Easing Factors: tylenol (once a week).    Medical History:   Past Medical History:   Diagnosis Date    Asthma     Diabetes mellitus     Hyperlipidemia     Hypertension     JOSUÉ on CPAP     Uterine cancer     Vaginal delivery     x3      Surgical History:   Hilary  has a past surgical history that includes mass removal; Tubal ligation; Colonoscopy (N/A, 2016); Breast biopsy (Left); Hysteroscopy with dilation and curettage of uterus (N/A, 2019); Robot-assisted laparoscopic abdominal hysterectomy using da Arabella Xi (N/A,  03/19/2019); Robot-assisted laparoscopic salpingo-oophorectomy using da Arabella Xi (Bilateral, 03/19/2019); pr removal of ovary/tube(s); Hysterectomy (03/2019); and Oophorectomy (03/2019).    Medications:   Hilary has a current medication list which includes the following prescription(s): albuterol, hydrochlorothiazide, mupirocin, mounjaro, [START ON 5/18/2024] tirzepatide, and [START ON 4/18/2024] tirzepatide.    Allergies:   Review of patient's allergies indicates:  No Known Allergies    Pool contraindications, including but not limited to, incontinence, seizures, fever/GI issues were reviewed with the patient. Patient agrees that based on their knowledge and medical history, they are appropriate for Aquatic Therapy.     OBJECTIVE     COGNITIVE Exam  Behaviors: normal  Memory: No Deficits noted  Safety awareness/insight to disability: impaired judgment and impaired insight  Coping skills/emotional control: passive coping strategies, christa . During session, Appropriate to situation    Dominant hand: right    Active ROM  UE 3/26/2024    Right Left Comments   Hands WFL WFL As a kid got bit by a rattle snake in her right hand; uses left hand for toileting hygiene.   Wrist WFL WFL    Elbows WFL WFL    Shoulders Minimal limited Minimal limited Limited ER, IR, shoulder flexion.    Low back Minimal limited Minimal limited    Hip Minimally limited Minimally limited Limited ER/IR   Knee Minimally limited Minimally limited Limited flexion   Ankle WFL WFL      Upper Extremity Strength - Manual Muscle Testing  Motion Tested 3/26/2024    Right Left    Shoulder Flexion 5/5 5/5   Shoulder Extension 5/5 5/5   Shoulder Abduction 5/5 4+/5   Shoulder IR 5/5 5/5   Shoulder ER 4+/5 4+/5   Elbow Flexion 5/5 5/5   Elbow Extension 5/5 5/5     Lower Extremity Strength - Manual Muscle Testing  Motion Tested 3/26/2024    Right  Left    Hip Flexion 4/5 4/5   Knee Extension 4+/5 4+/5   Knee Flexion 4+/5 4+/5       Balance, Endurance, and  Functional Testin times sit-stand  [place]orms  (adults 18-63 y/o) [facility]   3/26/2024   >12 sec= fall risk for general elderly  >16 sec= fall risk for Parkinson's disease  >10 sec= balance/vestibular dysfunction (<59 y/o)  >14.2 sec= balance/vestibular dysfunction (>59 y/o)  >12 sec= fall risk for CVA 16.35 seconds    (without UE used to push up from chair)     Timed Up and Go  [place]orms  (adults 18-63 y/o) [facility]  3/26/2024   Minimum standards/norms:    Norms: fall risk > 13.5 seconds.  Average: Males 7.3 sec, Females 8.1 sec 19.35 seconds    (without UE used to push up from chair, but right arm used for descend. No DME used.     Four Square Step Test  [place]   [facility]   3/26/2024   Healthy Adults Mean Score: 6.29 +/- 1.13 seconds   (Gopal et  al., 2018) marking dynamic balance and coordination 14.99 seconds     comments Stepping on blue lines; no DME used.       Endurance Testin Minute Step Test  Norm for age:  Age (years) 65-69 - Female - 93 steps nick physical independence  [facility]   3/26/2024   Time Completed  31.55   Number of Reps 20   Heart Rate 160 bpm   Reason for Stop point:  Shortness of Breath.      During physical testing, participation level was consistent.    TREATMENT & HOME EXERCISES/EDUCATION      Education provided:   - Functional pain scale  - YAKOV rate of perceived exertion scale  - Use of aquatic therapy as tool to improve functional performance of ADLs/IADLs  - Pool contraindications    Written Home Exercises Provided: yes.  Hilary demonstrated fair understanding of the education provided.     See EMR under Patient Instructions for exercises provided.     ASSESSMENT     Hilary presented to clinic today with use of SPC, and participated in Occupational Evaluation. Patient is unable to fully participate in her work, recreational, and home-based activities because of decreased functional  strength, decreased  AROM, decreased endurance, limited positional tolerance, and decreased mobility. She reports  decreased activity tolerance in the community due to the effort of getting in and out of the house. She will benefit from participating in a conditioning  program designed  to increase functional strength, flexibility, and endurance, as well as patient education, in order to meet functional goals    Hilary's prognosis is Fair due to  dyspnea. She will benefit from skilled outpatient Occupational Therapy to address the limitations and goals stated above and in the chart below, provide patient/family education, and to maximize patient's level of functional independence.    Plan of care discussed with patient: Yes  Pt's spiritual, cultural and educational needs considered and patient is agreeable to the plan of care and goals as stated below:     Medical necessity is demonstrated by the following problem list:  Profile and History Assessment of Occupational Performance Level of Clinical Decision Making Complexity Score   Occupational Profile:   Hilary Wilson is a 68 y.o. female who lives with their spouse and is retired. Hilary Wilson has difficulty with  ADLs and IADLs as listed previously, which  affecting her daily functional abilities. Her main goal for therapy is to walk better, work more ([employer] activities).    Comorbidities:    has a past medical history of Asthma, Diabetes mellitus, Hyperlipidemia, Hypertension, JOSUÉ on CPAP, Uterine cancer, and Vaginal delivery.    Medical and Therapy History Review:   Brief Performance Deficits    Physical:  Joint Stability  Muscle Power/Strength  Muscle Endurance  Proprioception Functions  Postural Control    Cognitive:  Safety Awareness/Insight to Disability    Psychosocial:   Pain avoidance, social isolation   Social Interaction  Habits  Routines  Rituals  Group Participation Clinical Decision Making:  low    Assessment Process:  Problem-Focused Assessments    Modification/Need for Assistance:  Not Necessary    Intervention Selection:  Several Treatment  Options       low  Based on PMHX, co morbidities , data from assessments and functional level of assistance required with task and clinical presentation directly impacting function.          Short Term Goals:  4 weeks  Status: initiated   Demonstrate correct use of breathing techniques during session to promote muscles of inspiration and expiration and to improve aerobic endurance for ADL performance, as evidenced by coordination of breath with movement, with minimal cues needed.    Demonstrate improved proprioception and body mechanics as demonstrated by self-correction of compensatory techniques in movement and form during session, with minimal cues.     Able to name two different active methods to managing stress and pain, as compared to prior method of symptom management.     Hilary Wilson will importance & demonstrate use of energy conservation & pacing strategies during therapy session to improve tolerance for work and home demands.      Tolerate Home Activity Plan (HAP)/ Home Exercise Program (HEP) to promote safety and independence with ADLs/IADLs, at least 2/5 days outside of days receiving therapy sessions.       Long Term Goals:  8 weeks  Status: Initiated   Hilary Wilson will verbalize plan for independence with scheduling their week to include functional exercise and implementation of HEP, considering pacing & mindfulness concepts.      Demonstrate increased activity tolerance to the level needed for work, home, and community activities, including standing to complete meal preparation, sitting during , attending neighborhood meetings, waiting at transportation hub or in queue for restaurant and/or movies, ability to attend Bahai, festivals, concerts, as evidenced by increasing to 90 steps in the 2 Minute Step Test.      Completes 5x sit to stand test in 12 seconds or less to improve ability to participate in community mobility.       Hilary Wilson will demonstrate  improved balance and coordination for safety in management of ADLs as demonstrated by  completion of Four Square Step Test in 10 seconds.     Eleria will demonstrate improved functional mobility as demonstrated by completion of TUG in 16 seconds.     Implementation of HEP/HAP and  educational components into daily schedule, per Eleria report.    Improved perception of function as evidenced by 3 point change in at least 2/5 activities related to personal goals.      Patient Specific Personal Functional Long Term Goals:   Vocational: walk more ([place] on aging)   Recreational : Jew   Daily Living Activities: house work   Measured by patient's self-reported performance and satisfaction of personal FRP goals, listed above in subjective section.   Total Score = sum of the activity performance scores / number of activities  Minimum detectable change (90%CI) for average score = 2 points  Minimum detectable change (90%CI) for single activity score  = 3 points    PLAN   Plan of care Certification: 3/26/2024 to 5/24/2024.    Outpatient Occupational Therapy 2 times weekly for 8 weeks to include the following interventions: manual therapy, aquatic therapy, patient education, therapeutic exercise, therapeutic activities, neuromuscular re-education, and self-care/home management.    Patient may be seen by [facility] as part of rehabilitation team.    GENET Thornton, TOÑO/L, CEAS-I  3/26/2024

## 2024-03-26 NOTE — PROGRESS NOTES
Hilary participated in OT evaluation today; please see plan of care for details.    GENET Thornton, OTR/L, CEAS-I  3/26/2024

## 2024-04-03 NOTE — PROGRESS NOTES
"[facility]  Occupational Therapy Treatment Note       Name: Hilary Wilson  MRN:[id number]  Encounter Date: 2024    Therapy Diagnosis:   Encounter Diagnosis   Name Primary?    Decreased activities of daily living (ADL) Yes     Referring provider: Ene Boudreaux MD    Physician Orders: Eval & Treat; Aquatic Therapy: knees, back.  Medical Diagnosis from Referral: R26.81 (ICD-10-CM) - Gait instability  Evaluation date: 3/26/2024  Authorization Period Expiration: 2024  Plan of Care Expiration: 2024  Visit # / Visits authorized:  (plus eval)      Precautions: Standard, Diabetes, and Fall    MARQUIS Visit #: 0/5     Time In:0800  Time Out: 0900  Total Appointment Time: 60 minutes    Subjective   She reports "this feels better than I was expecting".     Response to previous treatment: Ms. Wilson noted: no complaints    Functional change: Eval only at this time.    Hilary was partially compliant with parts of home exercise program given previously.   Writing therapist reminded Hilary of importance of completion of exercises and activities outside of session to attain goals.         Pain: 2/10  Location: knees, back.  Description: Aching    Patient Specific Activities based on Personal goals:       Activity  2024     Performance Satisfaction   Volunteer with events for senior citizens 1 1   2.  Cooking 5 1   3.  Carrying groceries up the stair into the home. 4 1   4.  Standing tolerance at [employer]  6 3   5.  Getting on/off the floor to clean underside of table, or when playing with grand kids.  4 2           Total Score  4 1.6    Scorin-10; Unable/Unsatisfied - Able/Satisfied    Objective     Objective Measures updated at progress report unless specified.     Treatment   Tech present throughout session    She received the treatments listed below:     Aquatic therapeutic activities and exercises  in order to increase participation in, endurance for, and performance with " "vocational, selfcare ADLs, and leisure activities in order to improve quality of life, for 60 minutes, including:    Introduction to stretching as tool to manage discomfort, as well as importance, and use of YAKOV scale to guide pacing.      Personal goals reviewed and updated as needed. Eleria rated performance of activities related to personal goals, as well as satisfaction of said performance. (Refer to subjective section for details).     Seated education and practice of deep-breathing technique, "pursed lip breathing" for use as needed for pain management/MM relaxation, and for internal self-regulation in conjunction w/ functional tasks as recovery of effective respiration during ADLS/IADLs and to bolster proper circulation and respiratory response.  Noted difficulty recruiting from lower lung/diaphragm during breath work, pt inhalation/exhalation isolated to mid-core.      FUNCTIONAL MOBILITY TRAINING x 2 laps each at beginning and 1 lap each at end of session: Walk forward/backward/lateral.    For improved functional gait, squatting, sitting tolerance, functional balance, bed mobility & rolling, bending over, cooking & cleaning:  LE EX x 20  Squat  Sit to stand  Heel raise with gluteal set  Hip abduction/adduction  Hip flex/ext       Improve overhead reaching/activities, standing tolerance, lifting mechanics, showering/bathing, dressing, cooking, reaching, pushing & fine motor skills:  UE EX/CORE  x 20  Shoulder flex/ext TA activation paddles open  Shoulder horizontal abd/add TA activation paddles open  NP:  Mini squat with push/pull red kickboard      Improved activity tolerance and community accessibility:   ENDURANCE  Bicycle in // bars 2 min   Torso twist // bars 2 min     No environmental, cultural, spiritual, developmental or education needs expressed or noted.    Patient Education and Home Exercises     Education provided:   - Role of aquatic therapy  - Hydration post therapy  - Progress towards goals "   - Pool contraindications  - Importance of completion of exercises and activities outside of session to attain goals   - Functional pain scale  - YAKOV rate of perceived exertion scale    Written Home Exercises Provided: yes.  Exercises were reviewed and Hilary was able to demonstrate them prior to the end of the session.  Hilary demonstrated fair  understanding of the HEP provided.     See EMR under Patient Instructions for exercises provided during therapy sessions.     Assessment     Ms. Wilson presents with good engagement and attitude. Receptive to education and tolerated today's session well.      Hilary is progressing well towards her goals and status of goals can be seen below. Patient's prognosis is Good.     Hilary would continue to benefit from skilled outpatient occupational therapy to address the deficits listed on initial evaluation, provide patient education, and to maximize patient's level of independence in the home and community environment.     Anticipated barriers to occupational therapy: psychosocial stressors          Short Term Goals:  4 weeks  Status: initiated   Demonstrate correct use of breathing techniques during session to promote muscles of inspiration and expiration and to improve aerobic endurance for ADL performance, as evidenced by coordination of breath with movement, with minimal cues needed.     Demonstrate improved proprioception and body mechanics as demonstrated by self-correction of compensatory techniques in movement and form during session, with minimal cues.      Able to name two different active methods to managing stress and pain, as compared to prior method of symptom management.      Hilary Wilson will importance & demonstrate use of energy conservation & pacing strategies during therapy session to improve tolerance for work and home demands.       Tolerate Home Activity Plan (HAP)/ Home Exercise Program (HEP) to promote safety and independence with ADLs/IADLs,  at least 2/5 days outside of days receiving therapy sessions.         Long Term Goals:  8 weeks  Status: Initiated   Hilary Wilson will verbalize plan for independence with scheduling their week to include functional exercise and implementation of HEP, considering pacing & mindfulness concepts.       Demonstrate increased activity tolerance to the level needed for work, home, and community activities, including standing to complete meal preparation, sitting during , attending neighborhood meetings, waiting at transportation hub or in queue for restaurant and/or movies, ability to attend [employer], festivals, concerts, as evidenced by increasing to 90 steps in the 2 Minute Step Test.       Completes 5x sit to stand test in 12 seconds or less to improve ability to participate in community mobility.        Hilary Wilson will demonstrate improved balance and coordination for safety in management of ADLs as demonstrated by  completion of Four Square Step Test in 10 seconds.      Hilary will demonstrate improved functional mobility as demonstrated by completion of TUG in 16 seconds.      Implementation of HEP/HAP and  educational components into daily schedule, per Eleria report.     Improved perception of function as evidenced by 3 point change in at least 2/5 activities related to personal goals.        Patient Specific Personal Functional Long Term Goals:   Vocational: walk more ([place] on aging)   Recreational : [employer]   Daily Living Activities: house work   Measured by patient's self-reported performance and satisfaction of personal FRP goals, listed above in subjective section.   Total Score = sum of the activity performance scores / number of activities  Minimum detectable change (90%CI) for average score = 2 points  Minimum detectable change (90%CI) for single activity score  = 3 points      Plan     Continue per plan of care.     Updates/Grading for next session: continue intro to exercise      Ginna Watters, OT   4/4/2024

## 2024-04-04 ENCOUNTER — CLINICAL SUPPORT (OUTPATIENT)
Dept: REHABILITATION | Facility: HOSPITAL | Age: 69
End: 2024-04-04
Payer: MEDICARE

## 2024-04-04 DIAGNOSIS — Z78.9 DECREASED ACTIVITIES OF DAILY LIVING (ADL): Primary | ICD-10-CM

## 2024-04-04 PROCEDURE — 97113 AQUATIC THERAPY/EXERCISES: CPT

## 2024-04-08 NOTE — PROGRESS NOTES
"[facility]  Occupational Therapy Treatment Note       Name: Hilary Wilson  MRN:[id number]  Encounter Date: 2024    Therapy Diagnosis:   Encounter Diagnosis   Name Primary?    Decreased activities of daily living (ADL) Yes     Referring provider: Ene Boudreaux MD    Physician Orders: Eval & Treat; Aquatic Therapy: knees, back.  Medical Diagnosis from Referral: R26.81 (ICD-10-CM) - Gait instability  Evaluation date: 3/26/2024  Authorization Period Expiration: 2024  Plan of Care Expiration: 2024  Visit # / Visits authorized:  (plus eval)      Precautions: Standard, Diabetes, and Fall    MARQUIS Visit #: 0/5     Time In: 07:58  Time Out: 09:06  Total Billable Time: 68 minutes    Subjective   She reports "I would do like my mom and would bear and tolerate the pain. But I am good today, and if I am tired or hurting I will sit down".    Response to previous treatment: Ms. Wilson noted: no complaints initially, a little sore a few days later.    Functional change: walking    Hilary was partially compliant with parts of home exercise program given previously.   Writing therapist reminded Hilary of importance of completion of exercises and activities outside of session to attain goals.         Pain: 1/10  Location: knees, back.  Description: Aching    Patient Specific Activities based on Personal goals:  Activity  2024     Performance Satisfaction   Volunteer with [employer] for senior citizens 1 1   2.  Cooking 5 1   3.  Carrying groceries up the stair into the home. 4 1   4.  Standing tolerance at [employer]  6 3   5.  Getting on/off the floor to clean underside of table, or when playing with grand kids.  4 2           Total Score  4 1.6    Scorin-10; Unable/Unsatisfied - Able/Satisfied    Objective     Objective Measures updated at progress report unless specified.     Treatment     She received the treatments listed below:     Aquatic therapeutic activities and exercises  in " order to increase participation in, endurance for, and performance with vocational, selfcare ADLs, and leisure activities in order to improve quality of life, for 68 minutes, including:    FUNCTIONAL MOBILITY TRAINING x 2 laps each at beginning and 1 lap each at end of session: Walk forward/backward/lateral. Cues given to engage arm swing.  During session, with use of parallel bars, x 2 laps with focus on increasing step length.    Improve overhead reaching/activities, standing tolerance, lifting mechanics, showering/bathing, dressing, cooking, reaching, pushing & fine motor skills:  UE EX/CORE  x 20  Shoulder flex/ext TA activation paddles open  Shoulder horizontal abd/add TA activation paddles open  Mini squat with push/pull red kickboard (x 30)  Thoracolumbar extension/flexion switches in parallel bars x 15.    For improved functional gait, squatting, sitting tolerance, functional balance, bed mobility & rolling, bending over, cooking & cleaning:  LE EX x 2 x 10, with walking 2 laps before start of 2nd set of reps.  Squat  Sit to stand; cues for forward weight shift, weight into heels and using squat, with education provided regarding comparison of technique with lifting floor to waist.  Heel raise with gluteal set  Hip abduction/adduction  Hip flex/ext     Improved activity tolerance and community accessibility:   ENDURANCE  Bicycle in // bars 2 minutes  Torso twist // bars 2 minutes  [place] in // 2 minutes    STRETCHES  At steps, piriformis stretch attempted.  Hamstring stretch    No environmental, cultural, spiritual, developmental or education needs expressed or noted.    Patient Education and Home Exercises     Education provided:   - Role of aquatic therapy  - Hydration post therapy  - Progress towards goals   - Pool contraindications  - Importance of completion of exercises and activities outside of session to attain goals   - Functional pain scale  - YAKOV rate of perceived exertion scale  - HEP: daily  stretch routine  - energy banking tracking sheet    Written Home Exercises Provided: yes, daily stretch routine.  Hilary demonstrated fair understanding of the education provided.     See EMR under Patient Instructions for exercises provided during therapy sessions.     Assessment     Ms. Wilson with positive report of finding walking becoming easier since starting therapy, and today, did well with session. Extensive education provided; verbalized tendency to bear and push through pain if needed, but currently reporting low pain. Overall, tolerated today's session well.      Hilary is progressing well towards her goals and status of goals can be seen below. Patient's prognosis is Good.     Hilary would continue to benefit from skilled outpatient occupational therapy to address the deficits listed on initial evaluation, provide patient education, and to maximize patient's level of independence in the home and community environment.     Anticipated barriers to occupational therapy: psychosocial stressors     GOALS:     Short Term Goals:  4 weeks  Status: initiated   Demonstrate correct use of breathing techniques during session to promote muscles of inspiration and expiration and to improve aerobic endurance for ADL performance, as evidenced by coordination of breath with movement, with minimal cues needed.     Demonstrate improved proprioception and body mechanics as demonstrated by self-correction of compensatory techniques in movement and form during session, with minimal cues.      Able to name two different active methods to managing stress and pain, as compared to prior method of symptom management.      Hilary Wilson will importance & demonstrate use of energy conservation & pacing strategies during therapy session to improve tolerance for work and home demands.       Tolerate Home Activity Plan (HAP)/ Home Exercise Program (HEP) to promote safety and independence with ADLs/IADLs, at least 2/5 days  outside of days receiving therapy sessions.         Long Term Goals:  8 weeks  Status: Initiated   Hilary Wilson will verbalize plan for independence with scheduling their week to include functional exercise and implementation of HEP, considering pacing & mindfulness concepts.       Demonstrate increased activity tolerance to the level needed for work, home, and community activities, including standing to complete meal preparation, sitting during , attending neighborhood meetings, waiting at transportation hub or in queue for restaurant and/or movies, ability to attend [employer], festivals, concerts, as evidenced by increasing to 90 steps in the 2 Minute Step Test.       Completes 5x sit to stand test in 12 seconds or less to improve ability to participate in community mobility.        Hilary Wilson will demonstrate improved balance and coordination for safety in management of ADLs as demonstrated by  completion of Four Square Step Test in 10 seconds.      Hilary will demonstrate improved functional mobility as demonstrated by completion of TUG in 16 seconds.      Implementation of HEP/HAP and  educational components into daily schedule, per Eleria report.     Improved perception of function as evidenced by 3 point change in at least 2/5 activities related to personal goals.        Patient Specific Personal Functional Long Term Goals:   Vocational: walk more ([employer] on aging)   Recreational : [employer]   Daily Living Activities: house work   Measured by patient's self-reported performance and satisfaction of personal FRP goals, listed above in subjective section.   Total Score = sum of the activity performance scores / number of activities  Minimum detectable change (90%CI) for average score = 2 points  Minimum detectable change (90%CI) for single activity score  = 3 points    Plan     Continue per plan of care.     Updates/Grading for next session: continue as tolerated, review daily stretch  routine, f/u energy tracking.    GENET Thornton, MARKR/L, CEAS-I  4/8/2024

## 2024-04-09 ENCOUNTER — CLINICAL SUPPORT (OUTPATIENT)
Dept: REHABILITATION | Facility: HOSPITAL | Age: 69
End: 2024-04-09
Payer: MEDICARE

## 2024-04-09 DIAGNOSIS — Z78.9 DECREASED ACTIVITIES OF DAILY LIVING (ADL): Primary | ICD-10-CM

## 2024-04-09 PROCEDURE — 97113 AQUATIC THERAPY/EXERCISES: CPT

## 2024-04-09 NOTE — PATIENT INSTRUCTIONS
ENERGY BANKING  Instead of tracking one activity at a time, you can track a few days, to get a baseline as well. Using a tracking sheet, filling out the activities you do on that day will help get insight in the use of energy throughout the day.     Activities will get a rating, based on the time it took to complete, and the level of effort it took to complete. In this example, activities are rated in 15 minute increments, with activities that are considered being relaxing getting a -1 score, while Light effort tasks gets 1 point, Moderate 2 points and Strenuous tasks 3 points per minute minutes. If a task takes 45 minutes to complete, but it didn't take a lot of effort, this task would get a rating of 3 points, while a task that takes 5 minutes of strenuous effort, would be a 1 point activity (3 points per 15 minutes divided by 3 = 1 point per 5 minutes).    Since you are tracking at least 3 days, you likely get a snap shot of how you did on perhaps good days, but also on the days you couldn't do as much. Add the total scores of the 3 days. Since you want to schedule your day with a bit of a cushion, in case a tasks is harder than expected, or takes longer, you want to set your daily goal to be less than what you have in the bank.     For instance, if your average seems to be 80 points, you would want to schedule your days to be a 5th less (divide 80 by 5, then multiple by 4), so 64. This means that you might want to break up some activities with something relaxing, as those at the activities that might give you some points back.          You want to schedule with fluctuation in mind, and attempt to put in relaxation activities throughout, and not like this example where relaxation only is happening in the morning and late in the afternoon. During the work hours, if some stretching, or breath work would have taken place, work might not have been a  48 point activity.

## 2024-04-15 NOTE — PROGRESS NOTES
"[facility]  Occupational Therapy Treatment Note       Name: Hilary Wilson  MRN:[id number]  Encounter Date: 2024    Therapy Diagnosis:   Encounter Diagnosis   Name Primary?    Decreased activities of daily living (ADL) Yes     Referring provider: Ene Boudreaux MD    Physician Orders: Eval & Treat; Aquatic Therapy: knees, back.  Medical Diagnosis from Referral: R26.81 (ICD-10-CM) - Gait instability  Evaluation date: 3/26/2024  Authorization Period Expiration: 2024  Plan of Care Expiration: 2024  Visit # / Visits authorized: 3 / 20 (plus eval)      Precautions: Standard, Diabetes, and Fall    MARQUIS Visit #: 0/5     Time In: 08:00  Time Out: 09:00  Total Billable Time: 10 minutes    Subjective   She reports "I have been doing well."T"he cruise is next month'.    Response to previous treatment: Ms. Wilson noted: no complaints initially, a little sore a few days later.    Functional change: walking    Hilary was partially compliant with parts of home exercise program given previously.         Pain: 0/10  Location: knees, back.  Description: Aching    Patient Specific Activities based on Personal goals:  Activity  2024     Performance Satisfaction   Volunteer with events for senior citizens 1 1   2.  Cooking 5 1   3.  Carrying groceries up the stair into the home. 4 1   4.  Standing tolerance at [employer]  6 3   5.  Getting on/off the floor to clean underside of table, or when playing with grand kids.  4 2           Total Score  4 1.6    Scorin-10; Unable/Unsatisfied - Able/Satisfied    Objective     Objective Measures updated at progress report unless specified.     Treatment     She received the treatments listed below:     Aquatic therapeutic activities and exercises  in order to increase participation in, endurance for, and performance with vocational, selfcare ADLs, and leisure activities in order to improve quality of life, for 60 minutes, including:    FUNCTIONAL MOBILITY " TRAINING x 2 laps each at beginning and 1 lap each at end of session: Walk forward/backward/lateral. Cues given to engage arm swing.  During session, with use of parallel bars, x 2 laps with focus on increasing step length.    Improve overhead reaching/activities, standing tolerance, lifting mechanics, showering/bathing, dressing, cooking, reaching, pushing & fine motor skills:  UE EX/CORE  x 15  Shoulder flex/ext TA activation paddles closed  Shoulder horizontal abd/add TA activation paddles closed  Thoracolumbar extension/flexion switches in parallel bars x 15.    For improved functional gait, squatting, sitting tolerance, functional balance, bed mobility & rolling, bending over, cooking & cleaning:  LE EX  1 x 15  Squat  Sit to stand; cues for forward weight shift, weight into heels and using squat, with education provided regarding comparison of technique with lifting floor to waist.  Heel raise with gluteal set  Hip abduction/adduction  Hip flex/ext  Cross walk steps; CW and CCW.      Improved activity tolerance and community accessibility:   ENDURANCE  Bicycle in // bars 2 minutes  Torso twist // bars 2 minutes  Grapevines in // 2 minutes    STRETCHES  At steps, piriformis stretch attempted.  Hamstring stretch    No environmental, cultural, spiritual, developmental or education needs expressed or noted.    Patient Education and Home Exercises     Education provided:   - Role of aquatic therapy  - Hydration post therapy  - Progress towards goals   - Pool contraindications  - Importance of completion of exercises and activities outside of session to attain goals   - Functional pain scale  - YAKOV rate of perceived exertion scale  - HEP: daily stretch routine  - energy banking tracking sheet  - brain/body connection    Written Home Exercises Provided: yes, daily stretch routine.  Hilary demonstrated fair understanding of the education provided.     See EMR under Patient Instructions for exercises provided during  therapy sessions.     Assessment     Ms. Wilson tolerated session well; good response to education provided. Stating to have increased pacing with tasks at home.     Hilary is progressing well towards her goals and status of goals can be seen below. Patient's prognosis is Good.     Hilary would continue to benefit from skilled outpatient occupational therapy to address the deficits listed on initial evaluation, provide patient education, and to maximize patient's level of independence in the home and community environment.     Anticipated barriers to occupational therapy: psychosocial stressors     GOALS:     Short Term Goals:  4 weeks  Status: initiated   Demonstrate correct use of breathing techniques during session to promote muscles of inspiration and expiration and to improve aerobic endurance for ADL performance, as evidenced by coordination of breath with movement, with minimal cues needed.     Demonstrate improved proprioception and body mechanics as demonstrated by self-correction of compensatory techniques in movement and form during session, with minimal cues.      Able to name two different active methods to managing stress and pain, as compared to prior method of symptom management.      Hilary Wilson will importance & demonstrate use of energy conservation & pacing strategies during therapy session to improve tolerance for work and home demands.       Tolerate Home Activity Plan (HAP)/ Home Exercise Program (HEP) to promote safety and independence with ADLs/IADLs, at least 2/5 days outside of days receiving therapy sessions.         Long Term Goals:  8 weeks  Status: Initiated   Hilary Wilson will verbalize plan for independence with scheduling their week to include functional exercise and implementation of HEP, considering pacing & mindfulness concepts.       Demonstrate increased activity tolerance to the level needed for work, home, and community activities, including standing to  complete meal preparation, sitting during , attending neighborhood meetings, waiting at transportation hub or in queue for restaurant and/or movies, ability to attend Buddhism, festivals, concerts, as evidenced by increasing to 90 steps in the 2 Minute Step Test.       Completes 5x sit to stand test in 12 seconds or less to improve ability to participate in community mobility.        Hilary Wilson will demonstrate improved balance and coordination for safety in management of ADLs as demonstrated by  completion of Four Square Step Test in 10 seconds.      Hilary will demonstrate improved functional mobility as demonstrated by completion of TUG in 16 seconds.      Implementation of HEP/HAP and  educational components into daily schedule, per [place] report.     Improved perception of function as evidenced by 3 point change in at least 2/5 activities related to personal goals.        Patient Specific Personal Functional Long Term Goals:   Vocational: walk more ([place] on aging)   Recreational : Buddhism   Daily Living Activities: house work   Measured by patient's self-reported performance and satisfaction of personal FRP goals, listed above in subjective section.   Total Score = sum of the activity performance scores / number of activities  Minimum detectable change (90%CI) for average score = 2 points  Minimum detectable change (90%CI) for single activity score  = 3 points    Plan     Continue per plan of care.     Updates/Grading for next session: continue as tolerated, review daily stretch routine, f/u energy tracking.    TOÑO Todd/L, CEAS-I  4/16/2024

## 2024-04-16 ENCOUNTER — CLINICAL SUPPORT (OUTPATIENT)
Dept: REHABILITATION | Facility: HOSPITAL | Age: 69
End: 2024-04-16
Payer: MEDICARE

## 2024-04-16 DIAGNOSIS — Z78.9 DECREASED ACTIVITIES OF DAILY LIVING (ADL): Primary | ICD-10-CM

## 2024-04-16 PROCEDURE — 97113 AQUATIC THERAPY/EXERCISES: CPT

## 2024-04-18 ENCOUNTER — CLINICAL SUPPORT (OUTPATIENT)
Dept: REHABILITATION | Facility: HOSPITAL | Age: 69
End: 2024-04-18
Payer: MEDICARE

## 2024-04-18 DIAGNOSIS — Z78.9 DECREASED ACTIVITIES OF DAILY LIVING (ADL): Primary | ICD-10-CM

## 2024-04-18 PROCEDURE — 97113 AQUATIC THERAPY/EXERCISES: CPT

## 2024-04-18 NOTE — PROGRESS NOTES
"[facility]  Occupational Therapy Treatment Note       Name: Hilary Wilson  MRN:[id number]  Encounter Date: 2024    Therapy Diagnosis:   Encounter Diagnosis   Name Primary?    Decreased activities of daily living (ADL) Yes       Referring provider: Ene Boudreaux MD    Physician Orders: Eval & Treat; Aquatic Therapy: knees, back.  Medical Diagnosis from Referral: R26.81 (ICD-10-CM) - Gait instability  Evaluation date: 3/26/2024  Authorization Period Expiration: 2024  Plan of Care Expiration: 2024  Visit # / Visits authorized: 3 / 20 (plus eval)      Precautions: Standard, Diabetes, and Fall    MARQUIS Visit #: 0/5     Time In: 08:00  Time Out: 09:00  Total Billable Time: 60 minutes    Subjective   She reports "if I keep moving I'll avoid getting stiff. So far its helped my pain stay low"     Response to previous treatment: Ms. Wilson noted: no complaints initially, a little sore a few days later.    Functional change: walking better, lower pain, stretching more, working on breath work.     Hilary was partially compliant with parts of home exercise program given previously. Working on stretch routine with modifications using band (performs stretch in AM); and monitoring shoulders and neck for tension. Encouraged to incorporate breath.         Pain: 1/10  Location: knees, back.  Description: Aching    Patient Specific Activities based on Personal goals:  Activity  2024     Performance Satisfaction   Volunteer with events for senior citizens 1 1   2.  Cooking 5 1   3.  Carrying groceries up the stair into the home. 4 1   4.  Standing tolerance at [employer]  6 3   5.  Getting on/off the floor to clean underside of table, or when playing with grand kids.  4 2           Total Score  4 1.6    Scorin-10; Unable/Unsatisfied - Able/Satisfied    Objective     Objective Measures updated at progress report unless specified.     Treatment     She received the treatments listed below: "     Aquatic therapeutic activities and exercises  in order to increase participation in, endurance for, and performance with vocational, selfcare ADLs, and leisure activities in order to improve quality of life, for 60 minutes, including:    FUNCTIONAL MOBILITY TRAINING x 2 laps each at beginning and 1 lap each at end of session: Walk forward/backward/lateral. Cues given to engage arm swing.  During session, with use of parallel bars, x 2 laps with focus on increasing step length.    Improve overhead reaching/activities, standing tolerance, lifting mechanics, showering/bathing, dressing, cooking, reaching, pushing & fine motor skills:  UE EX/CORE  x 20  Shoulder flex/ext TA activation paddles closed  Shoulder horizontal abd/add TA activation paddles closed  Thoracolumbar extension/flexion switches in parallel bars    For improved functional gait, squatting, sitting tolerance, functional balance, bed mobility & rolling, bending over, cooking & cleaning:  LE EX  1 x 20  Squat  Sit to stand; cues for forward weight shift, weight into heels and using squat, with education provided regarding comparison of technique with lifting floor to waist.  Heel raise with gluteal set  Hip abduction/adduction, Hip flex/ext  Cross walk steps; CW and CCW.      Improved activity tolerance and community accessibility:   ENDURANCE  Bicycle in // bars 2 minutes  Torso twist // bars 2 minutes  Grapevines in // 2 minutes    STRETCHES  At steps, piriformis stretch attempted using theraband  Hamstring stretch    No environmental, cultural, spiritual, developmental or education needs expressed or noted.    Patient Education and Home Exercises     Education provided:   - Role of aquatic therapy  - Hydration post therapy  - Progress towards goals   - Pool contraindications  - Importance of completion of exercises and activities outside of session to attain goals   - Functional pain scale  - YAKOV rate of perceived exertion scale  - HEP: daily  stretch routine  - energy banking tracking sheet  - brain/body connection    Written Home Exercises Provided: yes, daily stretch routine.  Hilary demonstrated fair understanding of the education provided.     See EMR under Patient Instructions for exercises provided during therapy sessions.     Assessment     Ms. Wilson tolerated session well; good response to education provided. Stating to have increased stretching with pain management at home.     Hilary is progressing well towards her goals and status of goals can be seen below. Patient's prognosis is Good.     Hilary would continue to benefit from skilled outpatient occupational therapy to address the deficits listed on initial evaluation, provide patient education, and to maximize patient's level of independence in the home and community environment.     Anticipated barriers to occupational therapy: psychosocial stressors     GOALS:     Short Term Goals:  4 weeks  Status: initiated   Demonstrate correct use of breathing techniques during session to promote muscles of inspiration and expiration and to improve aerobic endurance for ADL performance, as evidenced by coordination of breath with movement, with minimal cues needed.     Demonstrate improved proprioception and body mechanics as demonstrated by self-correction of compensatory techniques in movement and form during session, with minimal cues.      Able to name two different active methods to managing stress and pain, as compared to prior method of symptom management.      Hilary Wilson will importance & demonstrate use of energy conservation & pacing strategies during therapy session to improve tolerance for work and home demands.       Tolerate Home Activity Plan (HAP)/ Home Exercise Program (HEP) to promote safety and independence with ADLs/IADLs, at least 2/5 days outside of days receiving therapy sessions.         Long Term Goals:  8 weeks  Status: Initiated   Hilary Wilson will  verbalize plan for independence with scheduling their week to include functional exercise and implementation of HEP, considering pacing & mindfulness concepts.       Demonstrate increased activity tolerance to the level needed for work, home, and community activities, including standing to complete meal preparation, sitting during , attending neighborhood meetings, waiting at transportation hub or in queue for restaurant and/or movies, ability to attend [employer], festivals, concerts, as evidenced by increasing to 90 steps in the 2 Minute Step Test.       Completes 5x sit to stand test in 12 seconds or less to improve ability to participate in community mobility.        Hilary Wilson will demonstrate improved balance and coordination for safety in management of ADLs as demonstrated by  completion of Four Square Step Test in 10 seconds.      Hilary will demonstrate improved functional mobility as demonstrated by completion of TUG in 16 seconds.      Implementation of HEP/HAP and  educational components into daily schedule, per [facility] report.     Improved perception of function as evidenced by 3 point change in at least 2/5 activities related to personal goals.        Patient Specific Personal Functional Long Term Goals:   Vocational: walk more ([place] on aging)   Recreational : Jew   Daily Living Activities: house work   Measured by patient's self-reported performance and satisfaction of personal FRP goals, listed above in subjective section.   Total Score = sum of the activity performance scores / number of activities  Minimum detectable change (90%CI) for average score = 2 points  Minimum detectable change (90%CI) for single activity score  = 3 points    Plan     Continue per plan of care.     Updates/Grading for next session: continue as tolerated, review daily stretch routine, f/u energy tracking.    MALENA Harrington   4/18/2024

## 2024-04-19 NOTE — PROGRESS NOTES
"[facility]  Occupational Therapy Treatment [facility]       Name: Hilary Wilson  MRN:[id number]  Encounter Date: 4/23/2024    Therapy Diagnosis:   Encounter Diagnosis   Name Primary?    Decreased activities of daily living (ADL) Yes     Referring provider: Ene Boudreaux MD    Physician Orders: Eval & Treat; Aquatic Therapy: knees, back.  Medical Diagnosis from Referral: R26.81 (ICD-10-CM) - Gait instability  Evaluation date: 3/26/2024  Authorization Period Expiration: 12/31/2024  Plan of Care Expiration: 5/24/2024  Visit # / Visits authorized: 5 / 20 (plus eval)      Precautions: Standard, Diabetes, and Fall    MARQUIS Visit #: 0/5     Time In: 08:00  Time Out: 09:00  Total Billable Time: 60 minutes    Subjective   She reports "We went to [place] this weekend; it went so well". "I forgot my stretch routine there, I would like another copy".    Response to previous treatment: Ms. Wilson noted: no concerns reported.    Functional change: walking better, lower pain, stretching more, working on breath work. "Everything".    Hilary was partially compliant with parts of home exercise program given previously. Working on stretch routine with modifications using band (performs stretch in AM); and monitoring shoulders and neck for tension.         Pain Related Behaviors Observed: no; currently rating pain as 2/10.  Functional Pain Scale Rating 0-10: within the last 24 hours  Date 3/26/2024 4/23/2024   Average 4/10 2/10   Worst 4/10 3/10   Best 2/10 1/10   Composite score 3.33/10 2/10   [MARIA ELENA  is 1 point or 15-20% change in interference composite score (Arsenin et al. 2008)]     Location: knees, back.  Description: Aching  Functional Exacerbations: Standing prolonged time.  Easing Factors: tylenol (once a week).       Patient Specific Activities based on Personal goals:  Activity  4/4/2024     Performance Satisfaction   Volunteer with events for senior citizens 1 1   2.  Cooking 5 1   3.  Carrying groceries up " "the stair into the home. 4 1   4.  Standing tolerance at [facility]  6 3   5.  Getting on/off the floor to clean underside of table, or when playing with grand kids.  4 2           Total Score  4 1.6    Scorin-10; Unable/Unsatisfied - Able/Satisfied    Objective     Objective Measures updated at progress report unless specified.       Treatment     She received the treatments listed below:     Aquatic therapeutic activities and exercises  in order to increase participation in, endurance for, and performance with vocational, selfcare ADLs, and leisure activities in order to improve quality of life, for 60 minutes, including:    FUNCTIONAL MOBILITY TRAINING x 2 laps each at beginning and 1 lap each at end of session: Walk forward/backward/lateral. Cues given to engage arm swing.  During session, with use of parallel bars, x 2 laps with focus on increasing step length.    Improve overhead reaching/activities, standing tolerance, lifting mechanics, showering/bathing, dressing, cooking, reaching, pushing & fine motor skills:  UE EX/CORE  x 15  Shoulder flex/ext TA activation paddles closed  Shoulder horizontal abd/add TA activation paddles closed  Thoracolumbar extension/flexion switches in parallel bars    For improved functional gait, squatting, sitting tolerance, functional balance, bed mobility & rolling, bending over, cooking & cleaning:  LE EX  1 x 15  Squat  Sit to stand; cues for forward weight shift, weight into heels and using squat, with education provided regarding comparison of technique with lifting floor to waist.  Heel raise with gluteal set  Hip abduction/adduction, Hip flex/ext  Cross walk steps; CW and CCW.   4" step up and lateral step up x 10  Wobble board with (B)UE support needed x 2 minutes. Reported increased lower back pain with standing; review of use of step stool in home for pelvic tilt to be able to improve standing tolerance.     Improved activity tolerance and community accessibility: "   ENDURANCE  Bicycle in // bars 2 minutes  Torso twist // bars 2 minutes  Grapevines in // 2 minutes     STRETCHES  At steps, piriformis stretch attempted using theraband  Hamstring stretch    No environmental, cultural, spiritual, developmental or education needs expressed or noted.    Patient Education and Home Exercises     Education provided:   - Role of aquatic therapy  - Hydration post therapy  - Progress towards goals   - Pool contraindications  - Importance of completion of exercises and activities outside of session to attain goals   - Functional pain scale  - YAKOV rate of perceived exertion scale  - HEP: daily stretch routine  - energy banking tracking sheet  - brain/body connection    Written Home Exercises Provided: yes, daily stretch routine.  Hilary demonstrated fair understanding of the education provided.     See EMR under Patient Instructions for exercises provided during therapy sessions.     Assessment     Ms. Wilson tolerated session well; good response to education provided. Stating to have increased stretching with pain management at home. Aware of assessment next session.    Hilary is progressing well towards her goals and status of goals can be seen below. Patient's prognosis is Good.     Hilary would continue to benefit from skilled outpatient occupational therapy to address the deficits listed on initial evaluation, provide patient education, and to maximize patient's level of independence in the home and community environment.     Anticipated barriers to occupational therapy: psychosocial stressors     GOALS:     Short Term Goals:  4 weeks  Status: initiated   Demonstrate correct use of breathing techniques during session to promote muscles of inspiration and expiration and to improve aerobic endurance for ADL performance, as evidenced by coordination of breath with movement, with minimal cues needed.     Demonstrate improved proprioception and body mechanics as demonstrated by  self-correction of compensatory techniques in movement and form during session, with minimal cues.      Able to name two different active methods to managing stress and pain, as compared to prior method of symptom management.      Hilary Urbano Wilson will importance & demonstrate use of energy conservation & pacing strategies during therapy session to improve tolerance for work and home demands.       Tolerate Home Activity Plan (HAP)/ Home Exercise Program (HEP) to promote safety and independence with ADLs/IADLs, at least 2/5 days outside of days receiving therapy sessions.         Long Term Goals:  8 weeks  Status: Initiated   Hilary Yolie Wilson will verbalize plan for independence with scheduling their week to include functional exercise and implementation of HEP, considering pacing & mindfulness concepts.       Demonstrate increased activity tolerance to the level needed for work, home, and community activities, including standing to complete meal preparation, sitting during , attending neighborhood meetings, waiting at transportation hub or in queue for restaurant and/or movies, ability to attend Mu-ism, festivals, concerts, as evidenced by increasing to 90 steps in the 2 Minute Step Test.       Completes 5x sit to stand test in 12 seconds or less to improve ability to participate in community mobility.        Hilary Wilson will demonstrate improved balance and coordination for safety in management of ADLs as demonstrated by  completion of Four Square Step Test in 10 seconds.      Hilary will demonstrate improved functional mobility as demonstrated by completion of TUG in 16 seconds.      Implementation of HEP/HAP and  educational components into daily schedule, per [facility] report.     Improved perception of function as evidenced by 3 point change in at least 2/5 activities related to personal goals.        Patient Specific Personal Functional Long Term Goals:   Vocational: walk more ([place]  on aging)   Recreational : Roman [employer]   Daily Living Activities: house work   Measured by patient's self-reported performance and satisfaction of personal FRP goals, listed above in subjective section.   Total Score = sum of the activity performance scores / number of activities  Minimum detectable change (90%CI) for average score = 2 points  Minimum detectable change (90%CI) for single activity score  = 3 points    Plan     Continue per plan of care.     Updates/Grading for next session: continue as tolerated, review daily stretch routine, f/u energy tracking, assessment, home wobble board.    GENET Thornton, OTR/L, CEAS-I  4/23/2024

## 2024-04-23 ENCOUNTER — CLINICAL SUPPORT (OUTPATIENT)
Dept: REHABILITATION | Facility: HOSPITAL | Age: 69
End: 2024-04-23
Payer: MEDICARE

## 2024-04-23 DIAGNOSIS — Z78.9 DECREASED ACTIVITIES OF DAILY LIVING (ADL): Primary | ICD-10-CM

## 2024-04-23 PROCEDURE — 97113 AQUATIC THERAPY/EXERCISES: CPT

## 2024-04-29 ENCOUNTER — TELEPHONE (OUTPATIENT)
Dept: GYNECOLOGIC ONCOLOGY | Facility: CLINIC | Age: 69
End: 2024-04-29
Payer: MEDICARE

## 2024-04-29 ENCOUNTER — OFFICE VISIT (OUTPATIENT)
Dept: GYNECOLOGIC ONCOLOGY | Facility: CLINIC | Age: 69
End: 2024-04-29
Payer: MEDICARE

## 2024-04-29 VITALS
DIASTOLIC BLOOD PRESSURE: 76 MMHG | WEIGHT: 293 LBS | BODY MASS INDEX: 62.55 KG/M2 | HEART RATE: 103 BPM | SYSTOLIC BLOOD PRESSURE: 156 MMHG

## 2024-04-29 DIAGNOSIS — C54.1 ENDOMETRIAL CANCER: Primary | ICD-10-CM

## 2024-04-29 PROCEDURE — 99999 PR PBB SHADOW E&M-EST. PATIENT-LVL III: CPT | Mod: PBBFAC,,, | Performed by: OBSTETRICS & GYNECOLOGY

## 2024-04-29 PROCEDURE — 1101F PT FALLS ASSESS-DOCD LE1/YR: CPT | Mod: CPTII,S$GLB,, | Performed by: OBSTETRICS & GYNECOLOGY

## 2024-04-29 PROCEDURE — 1159F MED LIST DOCD IN RCRD: CPT | Mod: CPTII,S$GLB,, | Performed by: OBSTETRICS & GYNECOLOGY

## 2024-04-29 PROCEDURE — 3008F BODY MASS INDEX DOCD: CPT | Mod: CPTII,S$GLB,, | Performed by: OBSTETRICS & GYNECOLOGY

## 2024-04-29 PROCEDURE — 1160F RVW MEDS BY RX/DR IN RCRD: CPT | Mod: CPTII,S$GLB,, | Performed by: OBSTETRICS & GYNECOLOGY

## 2024-04-29 PROCEDURE — 3288F FALL RISK ASSESSMENT DOCD: CPT | Mod: CPTII,S$GLB,, | Performed by: OBSTETRICS & GYNECOLOGY

## 2024-04-29 PROCEDURE — 3078F DIAST BP <80 MM HG: CPT | Mod: CPTII,S$GLB,, | Performed by: OBSTETRICS & GYNECOLOGY

## 2024-04-29 PROCEDURE — 1125F AMNT PAIN NOTED PAIN PRSNT: CPT | Mod: CPTII,S$GLB,, | Performed by: OBSTETRICS & GYNECOLOGY

## 2024-04-29 PROCEDURE — 3077F SYST BP >= 140 MM HG: CPT | Mod: CPTII,S$GLB,, | Performed by: OBSTETRICS & GYNECOLOGY

## 2024-04-29 PROCEDURE — 99214 OFFICE O/P EST MOD 30 MIN: CPT | Mod: S$GLB,,, | Performed by: OBSTETRICS & GYNECOLOGY

## 2024-04-29 NOTE — PROGRESS NOTES
Subjective:      Patient ID: Hilary Wilson is a 69 y.o. female.    Chief Complaint: Follow-up      Treatment History  Clinical Stage IAG2 endometrial cancer (mainly in polyp, 3mm of 27 mm invasion)  RALH/BSO/[facility] for uterine extraction on 3/19/19  Morbid obesity precluding staging    Follow-up  Pertinent negatives include no abdominal pain, arthralgias, chest pain, chills, coughing, fatigue, fever, nausea, numbness, rash, sore throat, vomiting or weakness.     Here today for continued surveillance. Denies VB, F/C, N/V.  No new symptoms per patient.  Reports she has been doing well.    Review of Systems   Constitutional:  Negative for activity change, appetite change, chills, fatigue and fever.   HENT:  Negative for hearing loss, mouth sores, nosebleeds, sore throat and tinnitus.    Eyes:  Negative for visual disturbance.   Respiratory:  Negative for cough, chest tightness, shortness of breath and wheezing.    Cardiovascular:  Negative for chest pain and leg swelling.   Gastrointestinal:  Negative for abdominal distention, abdominal pain, blood in stool, constipation, diarrhea, nausea and vomiting.   Genitourinary:  Negative for dysuria, flank pain, frequency, hematuria, pelvic pain, vaginal bleeding, vaginal discharge and vaginal pain.   Musculoskeletal:  Negative for arthralgias and back pain.   Skin:  Negative for rash.   Neurological:  Negative for dizziness, seizures, syncope, weakness and numbness.   Hematological:  Does not bruise/bleed easily.   Psychiatric/Behavioral:  Negative for confusion and sleep disturbance. The patient is not nervous/anxious.      BP (!) 156/76   Pulse 103   Wt (!) 155.1 kg (342 lb)   LMP 05/23/2014 Comment: having bleeding  DUB  BMI 62.55 kg/m²     Objective:   Physical Exam:   Constitutional: She is oriented to person, place, and time. She appears well-developed and well-nourished. No distress.    HENT:   Head: Normocephalic and atraumatic.    Eyes: No scleral icterus.      Cardiovascular:       Exam reveals no cyanosis and no edema.        Pulmonary/Chest: Effort normal. No respiratory distress. She exhibits no tenderness.        Abdominal: Soft. She exhibits no distension, no fluid wave and no mass. There is no abdominal tenderness. There is no rebound and no guarding. No hernia.     Genitourinary:    Vagina normal.      Pelvic exam was performed with patient supine.   There is no rash, tenderness or lesion on the right labia. There is no rash, tenderness or lesion on the left labia. Right adnexum displays no mass, no tenderness and no fullness. Left adnexum displays no mass, no tenderness and no fullness. No vaginal discharge, bleeding (cuff without lesion) or prolapse of vaginal walls in the vagina. Cervix is absent.Uterus is absent.           Musculoskeletal: Moves all extremeties. No edema.      Lymphadenopathy:     She has no cervical adenopathy.    Neurological: She is alert and oriented to person, place, and time.    Skin: Skin is warm and dry. No cyanosis. No pallor.    Psychiatric: She has a normal mood and affect. Her behavior is normal.       Assessment:     1. Endometrial cancer        Plan:       BRITTNEY on exam today. 5 years disease free  RTC 12 months

## 2024-04-29 NOTE — TELEPHONE ENCOUNTER
----- Message from Kelsea Becerra sent at 4/29/2024  9:08 AM CDT -----  Regarding: Late p/t  Patient is calling to notify they are running late due to:    Patient said they will be: 15 minutes     Please call if need to be rescheduled.

## 2024-04-29 NOTE — TELEPHONE ENCOUNTER
----- Message from Cris Shea sent at 4/29/2024  8:41 AM CDT -----  Regarding: r/s appt  Contact: Pt @ [phone]  Pt is calling to speak to someone in the office to r/s her appt that she is currently scheduled for; no available appts in Epic. Please call to advise. Thanks.             Additional Info:   due to transportation

## 2024-04-30 ENCOUNTER — DOCUMENTATION ONLY (OUTPATIENT)
Dept: REHABILITATION | Facility: HOSPITAL | Age: 69
End: 2024-04-30
Payer: MEDICARE

## 2024-04-30 DIAGNOSIS — Z00.00 ENCOUNTER FOR MEDICARE ANNUAL WELLNESS EXAM: ICD-10-CM

## 2024-04-30 NOTE — PROGRESS NOTES
Hilary called to cancel her appointment today; she is having an asthma attack.     We will reassess next session, for review of plan of care.     GENET Thornton, MARKR/L, CEAS-I  4/30/2024

## 2024-05-07 ENCOUNTER — TELEPHONE (OUTPATIENT)
Dept: FAMILY MEDICINE | Facility: CLINIC | Age: 69
End: 2024-05-07
Payer: MEDICARE

## 2024-05-07 DIAGNOSIS — Z12.31 VISIT FOR SCREENING MAMMOGRAM: Primary | ICD-10-CM

## 2024-05-07 NOTE — TELEPHONE ENCOUNTER
----- Message from Mike Meng sent at 5/7/2024  4:32 PM CDT -----  Contact: Gil  Type:  Patient Call          Who Called: patient         Does the patient know what this is regarding?: Requesting a call  back to have mammo orders ; please advise           Would the patient rather a call back or a response via [facility]?call           Best Call Back Number:[phone]             Additional Information:

## 2024-05-09 ENCOUNTER — CLINICAL SUPPORT (OUTPATIENT)
Dept: REHABILITATION | Facility: HOSPITAL | Age: 69
End: 2024-05-09
Payer: MEDICARE

## 2024-05-09 DIAGNOSIS — Z78.9 DECREASED ACTIVITIES OF DAILY LIVING (ADL): Primary | ICD-10-CM

## 2024-05-09 PROCEDURE — 97113 AQUATIC THERAPY/EXERCISES: CPT

## 2024-05-09 NOTE — PROGRESS NOTES
"  [facility]  Occupational Therapy Treatment and PROGRESS Note      Name: Hilary Wilson  MRN:[id number]  Encounter Date: 5/9/2024    Therapy Diagnosis:   Encounter Diagnosis   Name Primary?    Decreased activities of daily living (ADL) Yes       Referring provider: Ene Boudreaux MD    Physician Orders: Eval & Treat; Aquatic Therapy: knees, back.  Medical Diagnosis from Referral: R26.81 (ICD-10-CM) - Gait instability  Evaluation date: 3/26/2024,   re-eval 5/9/2024  Authorization Period Expiration: 12/31/2024  Plan of Care Expiration: 5/24/2024  Visit # / Visits authorized: 6 / 20 (plus eval)      Precautions: Standard, Diabetes, and Fall    MARQUIS Visit #: 0/5     Time In: 0900  Time Out: 1000  Total Billable Time: 60 minutes    Subjective   She reports "I haven't been using my cane as much, I used it today because it was walking a longer distance"   She also plans to self-discharge in two weeks due to scheduled travel plans coming up, including going on [place] May 26-June 2, then moving to [place], [place] for the summer.      Response to previous treatment: Ms. Wilson noted: no concerns reported.    Functional change: walking better, lower pain, stretching more, working on breath work. "Everything".  States she is using her cane less. Playing with grandson and notes more energy. Getting out of the house more.     Hilary was partially compliant with parts of home exercise program given previously. Working on stretch routine with modifications using band  x2 a week (performs stretch in AM); and monitoring shoulders and neck for tension. States she is not noticing a difference if she has tension in shoulders. Practicing breathing taking deep breaths.         Pain Related Behaviors Observed: no; currently rating pain as 1/10.  Functional Pain Scale Rating 0-10: within the last 24 hours  Date 3/26/2024 4/23/2024 5/9/2024    Average 4/10 2/10 1/10   Worst 4/10 3/10 1/10   Best 2/10 1/10 1/10   Composite " score 3.33/10 2/10 1/10   [MARIA ELENA  is 1 point or 15-20% change in interference composite score (Buck et al. 2008)]     Location: knees, back.  Description: Aching  Functional Exacerbations: Standing prolonged time.  Easing Factors: tylenol (once a week).       Patient Specific Activities based on Personal goals:  Activity  2024       Performance (P) Satisfaction (S) P S   Volunteer with events for senior citizens 1 1     2.  Cooking 5 1     3.  Carrying groceries up the stair into the home. 4 1     4.  Standing tolerance at [employer]  6 3     5.  Getting on/off the floor to clean underside of table, or when playing with grand kids.  4 2               Total Score  4 1.6      Scorin-10; Unable/Unsatisfied - Able/Satisfied    Objective       Balance, Endurance, and Functional Testin times sit-stand  Norms  (adults 18-65 y/o) [facility]   3/26/2024 2024    >12 sec= fall risk for general elderly  >16 sec= fall risk for Parkinson's disease  >10 sec= balance/vestibular dysfunction (<61 y/o)  >14.2 sec= balance/vestibular dysfunction (>61 y/o)  >12 sec= fall risk for CVA 16.35 seconds     (without UE used to push up from chair) 13.89 sec    (Without UE used on chair)       Timed Up and Go  Norms  (adults 18-65 y/o) [facility]  3/26/2024 2024    Minimum standards/norms:    Norms: fall risk > 13.5 seconds.  Average: Males 7.3 sec, Females 8.1 sec 19.35 seconds     (without UE used to push up from chair, but right arm used for descend. No DME used. 13.70 seconds    (Without UE)      Four Square Step Test  Norms    [facility]   3/26/2024 2024    Healthy Adults Mean Score: 6.29 +/- 1.13 seconds   (Gopal et  al., 2018) marking dynamic balance and coordination 14.99 seconds   11.26 seconds    comments Stepping on blue lines; no DME used. Stepped on blue x1         Endurance Testin Minute Step Test  Norm for age:  Age (years) 65-69 - Female - 93 steps nick physical independence  [facility]   3/26/2024 2024    Time  Completed  31.55 55 seconds   Number of Reps 20 62 reps   Heart Rate 160 bpm 147 bp,   Reason for Stop point:  Shortness of Breath.  Shortness of breath.       During physical testing, participation level was consistent.      Treatment     She received the treatments listed below:     Aquatic therapeutic activities and exercises  in order to increase participation in, endurance for, and performance with vocational, selfcare ADLs, and leisure activities in order to improve quality of life, for 60 minutes, including:    Taking of measures for review of plan of care.     FUNCTIONAL MOBILITY TRAINING x 2 laps each at beginning and 1 lap each at end of session: Walk forward/backward/lateral. Cues given to engage arm swing.  During session, with use of parallel bars, x 2 laps with focus on increasing step length.    Review of Aquatic HEP:  -hamstring stretch  -quadricep stretch  -calf stretch  -hip flexor stretch  -heel raise  -standing bum squeeze  -thigh extension  -hamstring pull back  -hip flexion  -hip extension  -hip abduction/adduction  -hamstring curl    No environmental, cultural, spiritual, developmental or education needs expressed or noted.    Patient Education and Home Exercises     Education provided:   - Role of aquatic therapy  - Hydration post therapy  - Progress towards goals   - Pool contraindications  - Importance of completion of exercises and activities outside of session to attain goals   - Functional pain scale  - YAKOV rate of perceived exertion scale  - HEP: daily stretch routine  - energy banking tracking sheet  - brain/body connection    Written Home Exercises Provided: yes, daily stretch routine.  Hilary demonstrated fair understanding of the education provided.     See EMR under Patient Instructions for exercises provided during therapy sessions.     Assessment     Ms. Wilson presented motivated to participate and excited about reassessment, with perception of function improved and  demonstrated in objective measures taken today.     Measures taken show Hilary decreased fall risk, with 5TST 13.89 seconds. Hilary has improved her endurance in the 2 minute step test as well, thoughw as noted to continue to have difficulty coordinating movement with breath.     Ms. Wilson tolerated session well; good response to education provided. Stating to have be stretching for pain management at home about x2/week. Aware of importance of daily stretch, and open to trial completing more often. Plan to discharge on 5/23/24 2* self-discharge for summer travels.  Aquatic HEP reviewed today in preparation for discharge. Handout provided.     Hilary is progressing well towards her goals and status of goals can be seen below. Hilary has met 5 of her goals. Patient's prognosis is Good.  Plan to discharge on 5/23/24 2* self-discharge for summer travels.     Hilary would continue to benefit from skilled outpatient occupational therapy to address the deficits listed on initial evaluation, provide patient education, and to maximize patient's level of independence in the home and community environment.     Anticipated barriers to occupational therapy: psychosocial stressors     GOALS:     Short Term Goals:  4 weeks  Status: reviewed 5/9/2024   Demonstrate correct use of breathing techniques during session to promote muscles of inspiration and expiration and to improve aerobic endurance for ADL performance, as evidenced by coordination of breath with movement, with minimal cues needed.  PROGRESSING   Demonstrate improved proprioception and body mechanics as demonstrated by self-correction of compensatory techniques in movement and form during session, with minimal cues.   PROGRESSING   Able to name two different active methods to managing stress and pain, as compared to prior method of symptom management.   MET 5/9/2024      Hilary Wilson will importance & demonstrate use of energy conservation & pacing strategies  during therapy session to improve tolerance for work and home demands.    MET 5/9/2024    Tolerate Home Activity Plan (HAP)/ Home Exercise Program (HEP) to promote safety and independence with ADLs/IADLs, at least 2/5 days outside of days receiving therapy sessions.  MET 5/9/2024        Long Term Goals:  8 weeks  Status: reviewed 5/9/2024   Hilary Wilson will verbalize plan for independence with scheduling their week to include functional exercise and implementation of HEP, considering pacing & mindfulness concepts.    MET 5/9/2024    Demonstrate increased activity tolerance to the level needed for work, home, and community activities, including standing to complete meal preparation, sitting during , attending neighborhood meetings, waiting at transportation hub or in queue for restaurant and/or movies, ability to attend Hoahaoism, festivals, concerts, as evidenced by increasing to 90 steps in the 2 Minute Step Test.    PROGRESSING   Completes 5x sit to stand test in 12 seconds or less to improve ability to participate in community mobility.     PROGRESSING   Hilary Wilson will demonstrate improved balance and coordination for safety in management of ADLs as demonstrated by  completion of Four Square Step Test in 10 seconds.   PROGRESSING   Hilary will demonstrate improved functional mobility as demonstrated by completion of TUG in 16 seconds.   MET 5/9/2024    Implementation of HEP/HAP and  educational components into daily schedule, per [place] report.  PROGRESSING   Improved perception of function as evidenced by 3 point change in at least 2/5 activities related to personal goals.  PROGRESSING      Patient Specific Personal Functional Long Term Goals:   Vocational: walk more ([place] on aging)   Recreational : Hoahaoism   Daily Living Activities: house work   Measured by patient's self-reported performance and satisfaction of personal FRP goals, listed above in subjective section.   Total Score =  sum of the activity performance scores / number of activities  Minimum detectable change (90%CI) for average score = 2 points  Minimum detectable change (90%CI) for single activity score  = 3 points    Plan     Continue per plan of care.     Updates/Grading for next session: home wobble board, check on Aquatic HEP and DAILY stretch    MALENA Harrington   5/9/2024

## 2024-05-14 ENCOUNTER — CLINICAL SUPPORT (OUTPATIENT)
Dept: REHABILITATION | Facility: HOSPITAL | Age: 69
End: 2024-05-14
Payer: MEDICARE

## 2024-05-14 DIAGNOSIS — Z78.9 DECREASED ACTIVITIES OF DAILY LIVING (ADL): Primary | ICD-10-CM

## 2024-05-14 PROCEDURE — 97113 AQUATIC THERAPY/EXERCISES: CPT

## 2024-05-14 NOTE — PROGRESS NOTES
"[facility]  Occupational Therapy Treatment Note      Name: Hilary Wilson  MRN:[id number]  Encounter Date: 5/14/2024    Therapy Diagnosis:   Encounter Diagnosis   Name Primary?    Decreased activities of daily living (ADL) Yes     Referring provider: Ene Boudreaux MD    Physician Orders: Eval & Treat; Aquatic Therapy: knees, back.  Medical Diagnosis from Referral: R26.81 (ICD-10-CM) - Gait instability  Evaluation date: 3/26/2024,   re-eval 5/9/2024  Authorization Period Expiration: 12/31/2024  Plan of Care Expiration: 5/24/2024  Visit # / Visits authorized: 7 / 20 (plus eval)      Precautions: Standard, Diabetes, and Fall    MARQUIS Visit #: 0/5     Time In: 08:07  Time Out: 09:02  Total Billable Time: 55 minutes    Subjective   She reports "I'm already packed for my cruise". 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 I hadn't been able to do that for a long time".    Response to previous treatment: Ms. Wilson noted: no concerns reported.    Functional change: walking better, lower pain, stretching more, working on breath work. "Everything".  States she is using her cane less. Playing with grandson and notes more energy. Getting out of the house more.     Hilary was partially compliant with parts of home exercise program given previously. Working on stretch routine with modifications using band  x2 a week (performs stretch in PM); and monitoring shoulders and neck for tension. States she is not noticing a difference if she has tension in shoulders. Practicing breathing taking deep breaths.     Pain:      currently rating pain as 1/10.   Location: knees, back.  Description: Aching    Patient Specific Activities based on Personal goals:  Activity  4/4/2024 5/14/2024    Performance (P) Satisfaction (S) P S   Volunteer with events for senior citizens 1 1 8 10   2.  Cooking 5 1 10 10   3.  Carrying groceries up the stair into the home. 4 1 9 10   4.  Standing tolerance at " "[employer]  6 3 10 10   5.  Getting on/off the floor to clean underside of table, or when playing with grand kids.  4 2 8 8             Total Score  4 1.6  9 9.6   Scorin-10; Unable/Unsatisfied - Able/Satisfied    Objective     Objective measures updated at progress report or unless otherwise noted.    See progress note dated 2024    Treatment     She received the treatments listed below:     Aquatic therapeutic activities and exercises  in order to increase participation in, endurance for, and performance with vocational, selfcare ADLs, and leisure activities in order to improve quality of life, for 55 minutes, including:    FUNCTIONAL MOBILITY TRAINING x 2 laps each at beginning and 1 lap each at end of session: Walk forward/backward/lateral. Cues given to engage arm swing.  During session, with use of parallel bars, x 2 laps with focus on increasing step length.    For improved functional gait, squatting, sitting tolerance, functional balance, bed mobility & rolling, bending over, cooking & cleaning:  LE EX x 30  Squat with focus on gluteal activation  Heel raise with gluteal set  Hip abduction and adduction  Hip flexion and extension  4" Step up and lateral step up, leading with each leg x 10 reps.    Improve overhead reaching/activities, standing tolerance, lifting mechanics, showering/bathing, dressing, cooking, reaching, pushing & fine motor skills:  UE EX/CORE  x 20  Shoulder flexion and extension with transabdominal activation multi-colored dumbbells  Shoulder flexion and extension with transabdominal activation multi-colored dumbbells  Shoulder horizontal abduction and adduction with transabdominal activation multi-colored dumbbells  Thoracolumbar extension and flexion using parallel bars x 20    Improved activity tolerance and community accessibility:   ENDURANCE  Bicycle in using parallel bars x 3 minutes  Torso twist using parallel bars x 3 minutes    BALANCE  Four square step: clockwise, counter " clockwise and diagonals x 3 minutes.    PNS activation  Under water dribble using purple ball.    No environmental, cultural, spiritual, developmental or education needs expressed or noted.    Patient Education and Home Exercises     Education provided:   - Role of aquatic therapy  - Hydration post therapy  - Progress towards goals   - Pool contraindications  - Importance of completion of exercises and activities outside of session to attain goals   - Functional pain scale  - YAKOV rate of perceived exertion scale  - HEP: daily stretch routine  - energy banking tracking sheet  - brain/body connection  - Aquatic HEP    Written Home Exercises Provided: Patient instructed to cont prior HEP.  Hilary demonstrated fair understanding of the education provided.     See EMR under Patient Instructions for exercises provided during therapy sessions.     Assessment     Ms. Wilson presented with positive report, and activities rated regarding personal goals in terms of performance and satisfaction, with 5 and 8 point improvement reported, which is significant. She was open to discussion regarding progression, FRP flyer given, as [place] with request to return to aquatics once the summer is over, however, land based instead of aquatics might help Hilary progress beyond what we can offer her here.     During session, Hilary tolerated all activities presented to her. Hilary is progressing well towards her goals and status of goals can be seen below. Patient's prognosis is Good.  Plan to discharge on 5/23/24 2* self-discharge for summer travels.     Hilary would continue to benefit from skilled outpatient occupational therapy to address the deficits listed on initial evaluation, provide patient education, and to maximize patient's level of independence in the home and community environment.     Anticipated barriers to occupational therapy: psychosocial stressors     GOALS:     Short Term Goals:  4 weeks  Status: reviewed 5/9/2024    Demonstrate correct use of breathing techniques during session to promote muscles of inspiration and expiration and to improve aerobic endurance for ADL performance, as evidenced by coordination of breath with movement, with minimal cues needed.  PROGRESSING   Demonstrate improved proprioception and body mechanics as demonstrated by self-correction of compensatory techniques in movement and form during session, with minimal cues.   PROGRESSING   Able to name two different active methods to managing stress and pain, as compared to prior method of symptom management.   MET 5/9/2024      Hilary Wilson will importance & demonstrate use of energy conservation & pacing strategies during therapy session to improve tolerance for work and home demands.    MET 5/9/2024    Tolerate Home Activity Plan (HAP)/ Home Exercise Program (HEP) to promote safety and independence with ADLs/IADLs, at least 2/5 days outside of days receiving therapy sessions.  MET 5/9/2024        Long Term Goals:  8 weeks  Status: reviewed 5/9/2024   Hilary Wilson will verbalize plan for independence with scheduling their week to include functional exercise and implementation of HEP, considering pacing & mindfulness concepts.    MET 5/9/2024    Demonstrate increased activity tolerance to the level needed for work, home, and community activities, including standing to complete meal preparation, sitting during , attending neighborhood meetings, waiting at transportation hub or in queue for restaurant and/or movies, ability to attend Jewish, festivals, concerts, as evidenced by increasing to 90 steps in the 2 Minute Step Test.    PROGRESSING   Completes 5x sit to stand test in 12 seconds or less to improve ability to participate in community mobility.     PROGRESSING   Hilary Wilson will demonstrate improved balance and coordination for safety in management of ADLs as demonstrated by  completion of Four Square Step Test in 10  seconds.   PROGRESSING   Eler[place] will demonstrate improved functional mobility as demonstrated by completion of TUG in 16 seconds.   MET 5/9/2024    Implementation of HEP/HAP and  educational components into daily schedule, per Eleria report.  PROGRESSING   Improved perception of function as evidenced by 3 point change in at least 2/5 activities related to personal goals.  PROGRESSING      Patient Specific Personal Functional Long Term Goals:   Vocational: walk more ([place] on aging)   Recreational : Restorationist   Daily Living Activities: house work   Measured by patient's self-reported performance and satisfaction of personal FRP goals, listed above in subjective section.   Total Score = sum of the activity performance scores / number of activities  Minimum detectable change (90%CI) for average score = 2 points  Minimum detectable change (90%CI) for single activity score  = 3 points    Plan     Continue per plan of care; plan for discharge May 23rd due to scheduled Cruise, and being in [place] for the summer afterwards.     Updates/Grading for next session: as tolerated    GENET Thornton, OTR/L, CEAS-I  5/14/2024

## 2024-05-15 ENCOUNTER — HOSPITAL ENCOUNTER (OUTPATIENT)
Dept: RADIOLOGY | Facility: HOSPITAL | Age: 69
Discharge: HOME | End: 2024-05-15
Attending: FAMILY MEDICINE
Payer: MEDICARE

## 2024-05-15 DIAGNOSIS — Z12.31 VISIT FOR SCREENING MAMMOGRAM: ICD-10-CM

## 2024-05-15 DIAGNOSIS — J45.40 MODERATE PERSISTENT ASTHMA WITHOUT COMPLICATION: ICD-10-CM

## 2024-05-15 PROCEDURE — 77063 BREAST TOMOSYNTHESIS BI: CPT | Mod: TC,PO

## 2024-05-15 PROCEDURE — 77063 BREAST TOMOSYNTHESIS BI: CPT | Mod: 26,,, | Performed by: RADIOLOGY

## 2024-05-15 PROCEDURE — 77067 SCR MAMMO BI INCL CAD: CPT | Mod: TC,PO

## 2024-05-15 PROCEDURE — 77067 SCR MAMMO BI INCL CAD: CPT | Mod: 26,,, | Performed by: RADIOLOGY

## 2024-05-15 NOTE — TELEPHONE ENCOUNTER
Care Due:                  Date            Visit Type   Department     Provider  --------------------------------------------------------------------------------                                [facility]                              PRIMARY      MED/ INTERNAL  Last Visit: 03-      CARE ([facility])   MED/ PEDS      Ene Dorantes                              [facility]                              PRIMARY      MED/ INTERNAL  Next Visit: 09-      CARE ([facility])   MED/ PEDS      Ene Dorantes                                                            Last  Test          Frequency    Reason                     Performed    Due Date  --------------------------------------------------------------------------------    CMP.........  12 months..  hydroCHLOROthiazide......  07- 07-    HBA1C.......  6 months...  tirzepatide..............  07- 01-    [facility] Embedded Care Due Messages. Reference number: [id number].   5/15/2024 2:49:49 PM CDT

## 2024-05-16 ENCOUNTER — CLINICAL SUPPORT (OUTPATIENT)
Dept: REHABILITATION | Facility: HOSPITAL | Age: 69
End: 2024-05-16
Payer: MEDICARE

## 2024-05-16 DIAGNOSIS — Z78.9 DECREASED ACTIVITIES OF DAILY LIVING (ADL): Primary | ICD-10-CM

## 2024-05-16 PROCEDURE — 97113 AQUATIC THERAPY/EXERCISES: CPT

## 2024-05-16 RX ORDER — ALBUTEROL SULFATE 90 UG/1
AEROSOL, METERED RESPIRATORY (INHALATION)
Qty: 25.5 G | Refills: 0 | Status: SHIPPED | OUTPATIENT
Start: 2024-05-16

## 2024-05-16 NOTE — PROGRESS NOTES
"[facility]  Occupational Therapy Treatment Note      Name: Hilary Wilson  MRN:[id number]  Encounter Date: 5/16/2024    Therapy Diagnosis:   No diagnosis found.    Referring provider: Ene Boudreaux MD    Physician Orders: Eval & Treat; Aquatic Therapy: knees, back.  Medical Diagnosis from Referral: R26.81 (ICD-10-CM) - Gait instability  Evaluation date: 3/26/2024,   re-eval 5/9/2024  Authorization Period Expiration: 12/31/2024  Plan of Care Expiration: 5/24/2024  Visit # / Visits authorized: 8 / 20 (plus eval)      Precautions: Standard, Diabetes, and Fall    MARQUIS Visit #: 0/5     Time In: 09:00  Time Out: 10:00  Total Billable Time: 30 minutes    Subjective   She reports "I'm feeling better, just tight in my back"     Response to previous treatment: Ms. Wilson noted: no concerns reported.    Functional change: walking better, lower pain, stretching more, working on breath work. "Everything".  States she is using her cane less. Playing with grandson and notes more energy. Getting out of the house more. Feeling more prepared for cruise.     Hilary was partially compliant with parts of home exercise program given previously. Working on stretch routine with modifications using band  x2 a week (performs stretch in PM); and monitoring shoulders and neck for tension. States she is not noticing a difference if she has tension in shoulders. Practicing breathing taking deep breaths.     Pain:      currently rating pain as 1/10.   Location: knees, back.  Description: Aching    Patient Specific Activities based on Personal goals:  Activity  4/4/2024 5/14/2024    Performance (P) Satisfaction (S) P S   Volunteer with events for senior citizens 1 1 8 10   2.  Cooking 5 1 10 10   3.  Carrying groceries up the stair into the home. 4 1 9 10   4.  Standing tolerance at [employer]  6 3 10 10   5.  Getting on/off the floor to clean underside of table, or when playing with grand kids.  4 2 8 8             Total " "Score  4 1.6  9 9.6   Scorin-10; Unable/Unsatisfied - Able/Satisfied    Objective     Objective measures updated at progress report or unless otherwise noted.    See progress note dated 2024    Treatment     She received the treatments listed below:     Aquatic therapeutic activities and exercises  in order to increase participation in, endurance for, and performance with vocational, selfcare ADLs, and leisure activities in order to improve quality of life, for 60 minutes, including:    FUNCTIONAL MOBILITY TRAINING x 2 laps each at beginning and 1 lap each at end of session: Walk forward/backward/lateral. Cues given to engage arm swing.  During session, with use of parallel bars, x 2 laps with focus on increasing step length.    For improved functional gait, squatting, sitting tolerance, functional balance, bed mobility & rolling, bending over, cooking & cleaning:  LE EX x 30  Squat with focus on gluteal activation  Heel raise with gluteal set  Hip abduction and adduction  Hip flexion and extension  4" Step up and lateral step up, leading with each leg x 10 reps.  Hip circumduction x10 circles/side    Improve overhead reaching/activities, standing tolerance, lifting mechanics, showering/bathing, dressing, cooking, reaching, pushing & fine motor skills:  UE EX/CORE  x 20  Shoulder flexion and extension with transabdominal activation multi-colored dumbbells  Shoulder flexion and extension with transabdominal activation multi-colored dumbbells  Shoulder horizontal abduction and adduction with transabdominal activation multi-colored dumbbells  Thoracolumbar extension and flexion using parallel bars x 20    Improved activity tolerance and community accessibility:   ENDURANCE  Bicycle in using parallel bars x 5 minutes  Torso twist using parallel bars x 2 minutes    No environmental, cultural, spiritual, developmental or education needs expressed or noted.    Patient Education and Home Exercises     Education " "provided:   - Role of aquatic therapy  - Hydration post therapy  - Progress towards goals   - Pool contraindications  - Importance of completion of exercises and activities outside of session to attain goals   - Functional pain scale  - YAKOV rate of perceived exertion scale  - HEP: daily stretch routine  - energy banking tracking sheet  - brain/body connection  - Aquatic HEP    Written Home Exercises Provided: Patient instructed to cont prior HEP.  Hilary demonstrated fair understanding of the education provided.     See EMR under Patient Instructions for exercises provided during therapy sessions.     Assessment     Ms. Wilson presented with good engagement, states that she has been trialling aquatic HEP at home to gain familiarity. States that back is "tight" but feels better, and that the leg exercises are challenging for her. Plans to buy arm paddles for UE exercises.     Hilary is progressing well towards her goals and status of goals can be seen below. Patient's prognosis is Good.  Plan to discharge on 5/23/24 2* self-discharge for summer travels.     Hilary would continue to benefit from skilled outpatient occupational therapy to address the deficits listed on initial evaluation, provide patient education, and to maximize patient's level of independence in the home and community environment.     Anticipated barriers to occupational therapy: psychosocial stressors     GOALS:     Short Term Goals:  4 weeks  Status: reviewed 5/9/2024   Demonstrate correct use of breathing techniques during session to promote muscles of inspiration and expiration and to improve aerobic endurance for ADL performance, as evidenced by coordination of breath with movement, with minimal cues needed.  PROGRESSING   Demonstrate improved proprioception and body mechanics as demonstrated by self-correction of compensatory techniques in movement and form during session, with minimal cues.   PROGRESSING   Able to name two different active " methods to managing stress and pain, as compared to prior method of symptom management.   MET 5/9/2024      Hilary Wilson will importance & demonstrate use of energy conservation & pacing strategies during therapy session to improve tolerance for work and home demands.    MET 5/9/2024    Tolerate Home Activity Plan (HAP)/ Home Exercise Program (HEP) to promote safety and independence with ADLs/IADLs, at least 2/5 days outside of days receiving therapy sessions.  MET 5/9/2024        Long Term Goals:  8 weeks  Status: reviewed 5/9/2024   Hilary Wilson will verbalize plan for independence with scheduling their week to include functional exercise and implementation of HEP, considering pacing & mindfulness concepts.    MET 5/9/2024    Demonstrate increased activity tolerance to the level needed for work, home, and community activities, including standing to complete meal preparation, sitting during , attending neighborhood meetings, waiting at transportation hub or in queue for restaurant and/or movies, ability to attend [employer], festivals, concerts, as evidenced by increasing to 90 steps in the 2 Minute Step Test.    PROGRESSING   Completes 5x sit to stand test in 12 seconds or less to improve ability to participate in community mobility.     PROGRESSING   Hilary Wilson will demonstrate improved balance and coordination for safety in management of ADLs as demonstrated by  completion of Four Square Step Test in 10 seconds.   PROGRESSING   Hilary will demonstrate improved functional mobility as demonstrated by completion of TUG in 16 seconds.   MET 5/9/2024    Implementation of HEP/HAP and  educational components into daily schedule, per [place] report.  PROGRESSING   Improved perception of function as evidenced by 3 point change in at least 2/5 activities related to personal goals.  PROGRESSING      Patient Specific Personal Functional Long Term Goals:   Vocational: walk more ([place] on  aging)   Recreational : Catholic   Daily Living Activities: house work   Measured by patient's self-reported performance and satisfaction of personal FRP goals, listed above in subjective section.   Total Score = sum of the activity performance scores / number of activities  Minimum detectable change (90%CI) for average score = 2 points  Minimum detectable change (90%CI) for single activity score  = 3 points    Plan     Continue per plan of care; plan for discharge May 23rd due to scheduled Cruise, and being in [place] for the summer afterwards.     Updates/Grading for next session: as tolerated    Ginna Watters, LOTR   5/16/2024

## 2024-05-21 ENCOUNTER — CLINICAL SUPPORT (OUTPATIENT)
Dept: REHABILITATION | Facility: HOSPITAL | Age: 69
End: 2024-05-21
Payer: MEDICARE

## 2024-05-21 DIAGNOSIS — Z78.9 DECREASED ACTIVITIES OF DAILY LIVING (ADL): Primary | ICD-10-CM

## 2024-05-21 PROCEDURE — 97113 AQUATIC THERAPY/EXERCISES: CPT

## 2024-05-22 NOTE — PROGRESS NOTES
"[facility]  Occupational Therapy Treatment Note      Name: Hilary Wilson  MRN:[id number]  Encounter Date: 5/21/2024    Therapy Diagnosis:   Encounter Diagnosis   Name Primary?    Decreased activities of daily living (ADL) Yes       Referring provider: Ene Boudreaux MD    Physician Orders: Eval & Treat; Aquatic Therapy: knees, back.  Medical Diagnosis from Referral: R26.81 (ICD-10-CM) - Gait instability  Evaluation date: 3/26/2024,   re-eval 5/9/2024  Authorization Period Expiration: 12/31/2024  Plan of Care Expiration: 5/24/2024  Visit # / Visits authorized: 9 / 20 (plus eval)      Precautions: Standard, Diabetes, and Fall    MARQUIS Visit #: 1/5     Time In: 10:59  Time Out: 11:59  Total Billable Time: 60 minutes    Subjective   She reports "I'm feeling better, just tight in my back"     Response to previous treatment: Ms. Wilson noted: no concerns reported.    Functional change: walking better, lower pain, stretching more, working on breath work. "Everything".  States she is using her cane less. Playing with grandson and notes more energy. Getting out of the house more. Feeling more prepared for cruise.     Hilary was compliant with home exercise program given previously. Working on stretch routine with modifications using band  x2 a week (performs stretch in PM); and monitoring shoulders and neck for tension. States she is not noticing a difference if she has tension in shoulders. Practicing breathing taking deep breaths.     Pain:      currently rating pain as 1/10.   Location: knees, back.  Description: Aching    Patient Specific Activities based on Personal goals:  Activity  4/4/2024 5/14/2024    Performance (P) Satisfaction (S) P S   Volunteer with events for senior citizens 1 1 8 10   2.  Cooking 5 1 10 10   3.  Carrying groceries up the stair into the home. 4 1 9 10   4.  Standing tolerance at [employer]  6 3 10 10   5.  Getting on/off the floor to clean underside of table, or when " "playing with grand kids.  4 2 8 8             Total Score  4 1.6  9 9.6   Scorin-10; Unable/Unsatisfied - Able/Satisfied    Objective     Objective measures updated at progress report or unless otherwise noted.    See progress note dated 2024    Treatment     She received the treatments listed below:     Aquatic therapeutic activities and exercises  in order to increase participation in, endurance for, and performance with vocational, selfcare ADLs, and leisure activities in order to improve quality of life, for 60 minutes, including:    FUNCTIONAL MOBILITY TRAINING x 2 laps each at beginning and 1 lap each at end of session: Walk forward/backward/lateral. Cues given to engage arm swing.  During session, with use of parallel bars, x 2 laps with focus on increasing step length.    For improved functional gait, squatting, sitting tolerance, functional balance, bed mobility & rolling, bending over, cooking & cleaning:  LE EX x 30  Squat with focus on gluteal activation  Heel raise with gluteal set  Hip abduction and adduction  Hip flexion and extension  4" Step up and lateral step up, leading with each leg x 10 reps.  Hip circumduction x10 circles/side    Improve overhead reaching/activities, standing tolerance, lifting mechanics, showering/bathing, dressing, cooking, reaching, pushing & fine motor skills:  UE EX/CORE  x 20  Shoulder flexion and extension with transabdominal activation multi-colored dumbbells  Shoulder flexion and extension with transabdominal activation multi-colored dumbbells  Shoulder horizontal abduction and adduction with transabdominal activation multi-colored dumbbells  Thoracolumbar extension and flexion using parallel bars x 20    Improved activity tolerance and community accessibility:   ENDURANCE  Bicycle in using parallel bars x 5 minutes  Torso twist using parallel bars x 2 minutes    BALANCE:   [employer] balance board x 2 mins    No environmental, cultural, spiritual, developmental or " education needs expressed or noted.    Patient Education and Home Exercises     Education provided:   - Role of aquatic therapy  - Hydration post therapy  - Progress towards goals   - Pool contraindications  - Importance of completion of exercises and activities outside of session to attain goals   - Functional pain scale  - YAKOV rate of perceived exertion scale  - HEP: daily stretch routine  - energy banking tracking sheet  - brain/body connection  - Aquatic HEP    Written Home Exercises Provided: Patient instructed to cont prior HEP.  Hilary demonstrated fair understanding of the education provided.     See EMR under Patient Instructions for exercises provided during therapy sessions.     Assessment     Ms. Wilson presented with good engagement in today's session. Takes initiative of exercises and focuses on arm swings without verbal cues. Occasional cues for upright posture.  Plans to continue exercises on cruise.     Hilary is progressing well towards her goals and status of goals can be seen below. Patient's prognosis is Good.  Plan to discharge on 5/23/24 2* self-discharge for summer travels.     Hilary would continue to benefit from skilled outpatient occupational therapy to address the deficits listed on initial evaluation, provide patient education, and to maximize patient's level of independence in the home and community environment.     Anticipated barriers to occupational therapy: psychosocial stressors     GOALS:     Short Term Goals:  4 weeks  Status: reviewed 5/9/2024   Demonstrate correct use of breathing techniques during session to promote muscles of inspiration and expiration and to improve aerobic endurance for ADL performance, as evidenced by coordination of breath with movement, with minimal cues needed.  PROGRESSING   Demonstrate improved proprioception and body mechanics as demonstrated by self-correction of compensatory techniques in movement and form during session, with minimal cues.    PROGRESSING   Able to name two different active methods to managing stress and pain, as compared to prior method of symptom management.   MET 5/9/2024      Hilary Wilson will importance & demonstrate use of energy conservation & pacing strategies during therapy session to improve tolerance for work and home demands.    MET 5/9/2024    Tolerate Home Activity Plan (HAP)/ Home Exercise Program (HEP) to promote safety and independence with ADLs/IADLs, at least 2/5 days outside of days receiving therapy sessions.  MET 5/9/2024        Long Term Goals:  8 weeks  Status: reviewed 5/9/2024   Hilary Wilson will verbalize plan for independence with scheduling their week to include functional exercise and implementation of HEP, considering pacing & mindfulness concepts.    MET 5/9/2024    Demonstrate increased activity tolerance to the level needed for work, home, and community activities, including standing to complete meal preparation, sitting during , attending neighborhood meetings, waiting at transportation hub or in queue for restaurant and/or movies, ability to attend [employer], festivals, concerts, as evidenced by increasing to 90 steps in the 2 Minute Step Test.    PROGRESSING   Completes 5x sit to stand test in 12 seconds or less to improve ability to participate in community mobility.     PROGRESSING   Hilary Wilson will demonstrate improved balance and coordination for safety in management of ADLs as demonstrated by  completion of Four Square Step Test in 10 seconds.   PROGRESSING   Hilary will demonstrate improved functional mobility as demonstrated by completion of TUG in 16 seconds.   MET 5/9/2024    Implementation of HEP/HAP and  educational components into daily schedule, per [place] report.  PROGRESSING   Improved perception of function as evidenced by 3 point change in at least 2/5 activities related to personal goals.  PROGRESSING      Patient Specific Personal Functional Long Term  Goals:   Vocational: walk more ([place] on aging)   Recreational : Oriental orthodox   Daily Living Activities: house work   Measured by patient's self-reported performance and satisfaction of personal FRP goals, listed above in subjective section.   Total Score = sum of the activity performance scores / number of activities  Minimum detectable change (90%CI) for average score = 2 points  Minimum detectable change (90%CI) for single activity score  = 3 points    Plan     Continue per plan of care; plan for discharge May 23rd due to scheduled Cruise, and being in [place] for the summer afterwards.     Updates/Grading for next session: as tolerated    RAMONA Richter, LOTR   5/22/2024

## 2024-05-23 ENCOUNTER — CLINICAL SUPPORT (OUTPATIENT)
Dept: REHABILITATION | Facility: HOSPITAL | Age: 69
End: 2024-05-23
Payer: MEDICARE

## 2024-05-23 DIAGNOSIS — Z78.9 DECREASED ACTIVITIES OF DAILY LIVING (ADL): Primary | ICD-10-CM

## 2024-05-23 PROCEDURE — 97113 AQUATIC THERAPY/EXERCISES: CPT

## 2024-05-23 NOTE — PROGRESS NOTES
"[facility]  Occupational Therapy Treatment and Discharge Note      Name: Hilary Wilson  MRN:[id number]  Encounter Date: 5/23/2024    Therapy Diagnosis:   Encounter Diagnosis   Name Primary?    Decreased activities of daily living (ADL) Yes     Referring provider: Ene Boudreaux MD    Physician Orders: Eval & Treat; Aquatic Therapy: knees, back.  Medical Diagnosis from Referral: R26.81 (ICD-10-CM) - Gait instability  Evaluation date: 3/26/2024,   re-eval 5/9/2024  Authorization Period Expiration: 12/31/2024  Plan of Care Expiration: 5/24/2024  Visit # / Visits authorized: 9 / 20 (plus eval)      Precautions: Standard, Diabetes, and Fall    MARQUIS Visit #: 1/5     Time In: 10:59  Time Out: 11:59  Total Billable Time: 60 minutes    Subjective   She reports "I'm feeling better, just tight in my back"     Response to previous treatment: Ms. Wilson noted: no concerns reported.    Functional change: walking better, lower pain, stretching more, working on breath work. "Everything".  States she is using her cane less. Playing with grandson and notes more energy. Getting out of the house more. Feeling more prepared for cruise.     Hilary was compliant with home exercise program given previously. Working on stretch routine with modifications using band  x2 a week (performs stretch in PM); and monitoring shoulders and neck for tension. States she is not noticing a difference if she has tension in shoulders. Practicing breathing taking deep breaths.     Pain:      currently rating pain as 1/10.   Location: knees, back.  Description: Aching    Patient Specific Activities based on Personal goals:  Activity  4/4/2024 5/14/2024    Performance (P) Satisfaction (S) P S   Volunteer with events for senior citizens 1 1 8 10   2.  Cooking 5 1 10 10   3.  Carrying groceries up the stair into the home. 4 1 9 10   4.  Standing tolerance at [employer]  6 3 10 10   5.  Getting on/off the floor to clean underside of table, " or when playing with grand kids.  4 2 8 8             Total Score  4 1.6  9 9.6   Scorin-10; Unable/Unsatisfied - Able/Satisfied    Objective      COGNITIVE Exam  Behaviors: normal  Memory: No Deficits noted  Safety awareness/insight to disability: impaired judgment and impaired insight  Coping skills/emotional control: passive coping strategies, christa . During session, Appropriate to situation     Dominant hand: right     Active ROM           UE 3/26/2024 2024     Right Left Comments R L Comment    Hands WFL WFL As a kid got bit by a rattle snake in her right hand; uses left hand for toileting hygiene. WFL WFL    Wrist WFL WFL   WFL WFL    Elbows WFL WFL   WFL WFL    Shoulders Minimal limited Minimal limited Limited ER, IR, shoulder flexion.  WFL WFL    Low back Minimal limited Minimal limited   WFL WFL    Hip Minimally limited Minimally limited Limited ER/IR WFL WFL    Knee Minimally limited Minimally limited Limited flexion WFL WFL    Ankle WFL WFL   WFL WFL       Upper Extremity Strength - Manual Muscle Testing         Motion Tested 3/26/2024 2024     Right Left  R L   Shoulder Flexion 5/5 5/5 5/5 5/5   Shoulder Extension 5/5 5/5 5/5 5/5   Shoulder Abduction 5/5 4+/5 5/5 5/5   Shoulder IR 5/5 5/5 5/5 5/5   Shoulder ER 4+/5 4+/5 5/5 5/5   Elbow Flexion 5/5 5/5 5/5 5/5   Elbow Extension 5/5 5/5 5/5 5/5      Lower Extremity Strength - Manual Muscle Testing         Motion Tested 3/26/2024 2024     Right  Left  R L   Hip Flexion 4/5 4/5 5/5 5/5   Knee Extension 4+/5 4+/5 4+/5 5/5   Knee Flexion 4+/5 4+/5 4+/5 5/5      Balance, Endurance, and Functional Testin times sit-stand  [facility]  (adults 18-65 y/o) Eval   3/26/2024 2024    >12 sec= fall risk for general elderly  >16 sec= fall risk for Parkinson's disease  >10 sec= balance/vestibular dysfunction (<59 y/o)  >14.2 sec= balance/vestibular dysfunction (>59 y/o)  >12 sec= fall risk for CVA 16.35 seconds     (without UE used to push up  from chair) 13.89 sec     (Without UE used on chair)       Timed Up and Go  [facility]orms  (adults 18-65 y/o) [facility]  3/26/2024 2024    Minimum standards/norms:    Norms: fall risk > 13.5 seconds.  Average: Males 7.3 sec, Females 8.1 sec 19.35 seconds     (without UE used to push up from chair, but right arm used for descend. No DME used. 13.70 seconds     (Without UE)      Four Square Step Test  [facility]    [facility]   3/26/2024 2024    Healthy Adults Mean Score: 6.29 +/- 1.13 seconds   (Gopal et  al., 2018) marking dynamic balance and coordination 14.99 seconds   11.26 seconds    comments Stepping on blue lines; no DME used. Stepped on blue x1         Endurance Testin Minute Step Test  Norm for age:  Age (years) 65-69 - Female - 93 steps nick physical independence  [facility]   3/26/2024 2024    Time Completed  31.55 55 seconds   Number of Reps 20 62 reps   Heart Rate 160 bpm 147 bp,   Reason for Stop point:  Shortness of Breath.  Shortness of breath.       During physical testing, participation level was consistent.    Treatment     She received the treatments listed below:     Aquatic therapeutic activities and exercises  in order to increase participation in, endurance for, and performance with vocational, selfcare ADLs, and leisure activities in order to improve quality of life, for 60 minutes, including:    Aquatic HEP reviewed, handout printed and provided prior session. Pt reviewed via teachback.   Rated as easy but challenging.     Taking progress measures for discharge today.     No environmental, cultural, spiritual, developmental or education needs expressed or noted.    Patient Education and Home Exercises     Education provided:   - Role of aquatic therapy  - Hydration post therapy  - Progress towards goals   - Pool contraindications  - Importance of completion of exercises and activities outside of session to attain goals   - Functional pain scale  - YAKOV rate of perceived exertion scale  - HEP: daily  stretch routine  - energy banking tracking sheet  - brain/body connection  - Aquatic HEP    Written Home Exercises Provided: Patient instructed to cont prior HEP.  Hilary demonstrated fair understanding of the education provided.     See EMR under Patient Instructions for exercises provided during therapy sessions.     Assessment     Ms. Wilson demonstrated independence in aquatic HEP today. Endorsed high satisfaction with personal goals, and states that she feels prepared to continue HEP independently upon discharge both in the aquatic setting and then also on land.   She has met 9 of her goals, and not met 3. She is ready for discharge to continue HEP independently.     GOALS:     Short Term Goals:  4 weeks  Status: reviewed 5/23/2024    Demonstrate correct use of breathing techniques during session to promote muscles of inspiration and expiration and to improve aerobic endurance for ADL performance, as evidenced by coordination of breath with movement, with minimal cues needed.  MET 5/23/2024    Demonstrate improved proprioception and body mechanics as demonstrated by self-correction of compensatory techniques in movement and form during session, with minimal cues.   MET 5/23/2024    Able to name two different active methods to managing stress and pain, as compared to prior method of symptom management.   MET 5/9/2024      Hilary Wilson will importance & demonstrate use of energy conservation & pacing strategies during therapy session to improve tolerance for work and home demands.    MET 5/9/2024    Tolerate Home Activity Plan (HAP)/ Home Exercise Program (HEP) to promote safety and independence with ADLs/IADLs, at least 2/5 days outside of days receiving therapy sessions.  MET 5/9/2024        Long Term Goals:  8 weeks  Status: reviewed 5/23/2024    Hilary Wilson will verbalize plan for independence with scheduling their week to include functional exercise and implementation of HEP, considering  pacing & mindfulness concepts.    MET 5/9/2024    Demonstrate increased activity tolerance to the level needed for work, home, and community activities, including standing to complete meal preparation, sitting during , attending neighborhood meetings, waiting at transportation hub or in queue for restaurant and/or movies, ability to attend Zoroastrianism, festivals, concerts, as evidenced by increasing to 90 steps in the 2 Minute Step Test.    NOT MET 5/23/2024    Completes 5x sit to stand test in 12 seconds or less to improve ability to participate in community mobility.     NOT MET 5/23/2024    Hilary Wilson will demonstrate improved balance and coordination for safety in management of ADLs as demonstrated by  completion of Four Square Step Test in 10 seconds.   NOT MET 5/23/2024    Hilary will demonstrate improved functional mobility as demonstrated by completion of TUG in 16 seconds.   MET 5/9/2024    Implementation of HEP/HAP and  educational components into daily schedule, per [place] report.  MET 5/23/2024    Improved perception of function as evidenced by 3 point change in at least 2/5 activities related to personal goals.  MET 5/23/2024       Patient Specific Personal Functional Long Term Goals:   Vocational: walk more ([place] on aging)   Recreational : Zoroastrianism   Daily Living Activities: house work   Measured by patient's self-reported performance and satisfaction of personal FRP goals, listed above in subjective section.   Total Score = sum of the activity performance scores / number of activities  Minimum detectable change (90%CI) for average score = 2 points  Minimum detectable change (90%CI) for single activity score  = 3 points    Plan     Pt is discharged from OP occupational therapy services at this time. Pt has completed testing measures and progress toward goals. Pt is safe to discharge and complete HEP independently.        MALENA Harrington   5/23/2024              he is to continue on the hydroxychloroquine, azithromycin and IV steroids.  Start IV Toci follow markers

## 2024-07-10 DIAGNOSIS — E11.9 TYPE 2 DIABETES MELLITUS WITHOUT COMPLICATION: ICD-10-CM

## 2024-07-23 ENCOUNTER — TELEPHONE (OUTPATIENT)
Dept: FAMILY MEDICINE | Facility: CLINIC | Age: 69
End: 2024-07-23
Payer: MEDICARE

## 2024-07-23 DIAGNOSIS — I10 ESSENTIAL HYPERTENSION: ICD-10-CM

## 2024-07-23 DIAGNOSIS — E11.65 TYPE 2 DIABETES MELLITUS WITH HYPERGLYCEMIA, WITHOUT LONG-TERM CURRENT USE OF INSULIN: Primary | ICD-10-CM

## 2024-07-23 NOTE — TELEPHONE ENCOUNTER
Please contact patient.  Please reschedule her labs.  She missed her lab appointment on 7/11.  Please let me know if new lab orders are needed.

## 2024-07-30 ENCOUNTER — LAB VISIT (OUTPATIENT)
Dept: LAB | Facility: HOSPITAL | Age: 69
End: 2024-07-30
Attending: FAMILY MEDICINE
Payer: MEDICARE

## 2024-07-30 DIAGNOSIS — I10 ESSENTIAL HYPERTENSION: ICD-10-CM

## 2024-07-30 DIAGNOSIS — E11.65 TYPE 2 DIABETES MELLITUS WITH HYPERGLYCEMIA, WITHOUT LONG-TERM CURRENT USE OF INSULIN: ICD-10-CM

## 2024-07-30 LAB
ALBUMIN SERPL BCP-MCNC: 3.7 G/DL (ref 3.5–5.2)
ALP SERPL-CCNC: 99 U/L (ref 55–135)
ALT SERPL W/O P-5'-P-CCNC: 39 U/L (ref 10–44)
ANION GAP SERPL CALC-SCNC: 13 MMOL/L (ref 8–16)
AST SERPL-CCNC: 23 U/L (ref 10–40)
BASOPHILS # BLD AUTO: 0.02 K/UL (ref 0–0.2)
BASOPHILS NFR BLD: 0.2 % (ref 0–1.9)
BILIRUB SERPL-MCNC: 0.7 MG/DL (ref 0.1–1)
BUN SERPL-MCNC: 13 MG/DL (ref 8–23)
CALCIUM SERPL-MCNC: 9.4 MG/DL (ref 8.7–10.5)
CHLORIDE SERPL-SCNC: 104 MMOL/L (ref 95–110)
CHOLEST SERPL-MCNC: 196 MG/DL (ref 120–199)
CHOLEST/HDLC SERPL: 5.4 {RATIO} (ref 2–5)
CO2 SERPL-SCNC: 24 MMOL/L (ref 23–29)
CREAT SERPL-MCNC: 0.8 MG/DL (ref 0.5–1.4)
DIFFERENTIAL METHOD BLD: ABNORMAL
EOSINOPHIL # BLD AUTO: 0.1 K/UL (ref 0–0.5)
EOSINOPHIL NFR BLD: 1 % (ref 0–8)
ERYTHROCYTE [DISTWIDTH] IN BLOOD BY AUTOMATED COUNT: 14.4 % (ref 11.5–14.5)
EST. GFR  (NO RACE VARIABLE): >60 ML/MIN/1.73 M^2
ESTIMATED AVG GLUCOSE: 117 MG/DL (ref 68–131)
GLUCOSE SERPL-MCNC: 104 MG/DL (ref 70–110)
HBA1C MFR BLD: 5.7 % (ref 4–5.6)
HCT VFR BLD AUTO: 44.8 % (ref 37–48.5)
HDLC SERPL-MCNC: 36 MG/DL (ref 40–75)
HDLC SERPL: 18.4 % (ref 20–50)
HGB BLD-MCNC: 14.1 G/DL (ref 12–16)
IMM GRANULOCYTES # BLD AUTO: 0.05 K/UL (ref 0–0.04)
IMM GRANULOCYTES NFR BLD AUTO: 0.6 % (ref 0–0.5)
LDLC SERPL CALC-MCNC: 137.2 MG/DL (ref 63–159)
LYMPHOCYTES # BLD AUTO: 2 K/UL (ref 1–4.8)
LYMPHOCYTES NFR BLD: 24.4 % (ref 18–48)
MCH RBC QN AUTO: 27.4 PG (ref 27–31)
MCHC RBC AUTO-ENTMCNC: 31.5 G/DL (ref 32–36)
MCV RBC AUTO: 87 FL (ref 82–98)
MONOCYTES # BLD AUTO: 0.7 K/UL (ref 0.3–1)
MONOCYTES NFR BLD: 8.8 % (ref 4–15)
NEUTROPHILS # BLD AUTO: 5.3 K/UL (ref 1.8–7.7)
NEUTROPHILS NFR BLD: 65 % (ref 38–73)
NONHDLC SERPL-MCNC: 160 MG/DL
NRBC BLD-RTO: 0 /100 WBC
PLATELET # BLD AUTO: 211 K/UL (ref 150–450)
PMV BLD AUTO: 11.6 FL (ref 9.2–12.9)
POTASSIUM SERPL-SCNC: 3.8 MMOL/L (ref 3.5–5.1)
PROT SERPL-MCNC: 8.3 G/DL (ref 6–8.4)
RBC # BLD AUTO: 5.14 M/UL (ref 4–5.4)
SODIUM SERPL-SCNC: 141 MMOL/L (ref 136–145)
TRIGL SERPL-MCNC: 114 MG/DL (ref 30–150)
TSH SERPL DL<=0.005 MIU/L-ACNC: 0.95 UIU/ML (ref 0.4–4)
WBC # BLD AUTO: 8.2 K/UL (ref 3.9–12.7)

## 2024-07-30 PROCEDURE — 80053 COMPREHEN METABOLIC PANEL: CPT | Performed by: FAMILY MEDICINE

## 2024-07-30 PROCEDURE — 36415 COLL VENOUS BLD VENIPUNCTURE: CPT | Mod: PO | Performed by: FAMILY MEDICINE

## 2024-07-30 PROCEDURE — 83036 HEMOGLOBIN GLYCOSYLATED A1C: CPT | Performed by: FAMILY MEDICINE

## 2024-07-30 PROCEDURE — 80061 LIPID PANEL: CPT | Performed by: FAMILY MEDICINE

## 2024-07-30 PROCEDURE — 84443 ASSAY THYROID STIM HORMONE: CPT | Performed by: FAMILY MEDICINE

## 2024-07-30 PROCEDURE — 85025 COMPLETE CBC W/AUTO DIFF WBC: CPT | Performed by: FAMILY MEDICINE

## 2024-09-04 DIAGNOSIS — Z78.0 MENOPAUSE: ICD-10-CM

## 2024-10-03 ENCOUNTER — OFFICE VISIT (OUTPATIENT)
Dept: FAMILY MEDICINE | Facility: CLINIC | Age: 69
End: 2024-10-03
Payer: MEDICARE

## 2024-10-03 ENCOUNTER — TELEPHONE (OUTPATIENT)
Dept: FAMILY MEDICINE | Facility: CLINIC | Age: 69
End: 2024-10-03

## 2024-10-03 VITALS
TEMPERATURE: 98 F | WEIGHT: 293 LBS | DIASTOLIC BLOOD PRESSURE: 80 MMHG | HEART RATE: 97 BPM | SYSTOLIC BLOOD PRESSURE: 138 MMHG | OXYGEN SATURATION: 99 % | BODY MASS INDEX: 62.42 KG/M2

## 2024-10-03 DIAGNOSIS — Z23 NEED FOR SHINGLES VACCINE: ICD-10-CM

## 2024-10-03 DIAGNOSIS — Z78.9 DECREASED ACTIVITIES OF DAILY LIVING (ADL): ICD-10-CM

## 2024-10-03 DIAGNOSIS — G47.33 OSA ON CPAP: ICD-10-CM

## 2024-10-03 DIAGNOSIS — E66.01 MORBID (SEVERE) OBESITY DUE TO EXCESS CALORIES: ICD-10-CM

## 2024-10-03 DIAGNOSIS — Z00.00 ENCOUNTER FOR PREVENTIVE HEALTH EXAMINATION: Primary | ICD-10-CM

## 2024-10-03 DIAGNOSIS — Z71.89 ADVANCED DIRECTIVES, COUNSELING/DISCUSSION: ICD-10-CM

## 2024-10-03 DIAGNOSIS — Z00.00 ENCOUNTER FOR MEDICARE ANNUAL WELLNESS EXAM: ICD-10-CM

## 2024-10-03 DIAGNOSIS — I10 ESSENTIAL HYPERTENSION: ICD-10-CM

## 2024-10-03 DIAGNOSIS — E11.65 TYPE 2 DIABETES MELLITUS WITH HYPERGLYCEMIA, WITHOUT LONG-TERM CURRENT USE OF INSULIN: ICD-10-CM

## 2024-10-03 PROCEDURE — 99999 PR PBB SHADOW E&M-EST. PATIENT-LVL IV: CPT | Mod: PBBFAC,,, | Performed by: NURSE PRACTITIONER

## 2024-10-03 RX ORDER — ZOSTER VACCINE RECOMBINANT, ADJUVANTED 50 MCG/0.5
0.5 KIT INTRAMUSCULAR ONCE
Qty: 1 EACH | Refills: 0 | Status: SHIPPED | OUTPATIENT
Start: 2024-10-03 | End: 2024-10-03

## 2024-10-03 NOTE — PROGRESS NOTES
HPI     Chief Complaint:  AWV    Hilary Wilson is a 69 y.o. female with multiple medical diagnoses as listed in the medical history and problem list that presented for a Medicare AWV and comprehensive Health Risk Assessment today.      The following components were reviewed and updated:    Medical history  Family History  Social history  Allergies and Current Medications  Health Risk Assessment  Health Maintenance  Care Team     HPI      Assessment & Plan       Diagnoses and health risks identified today and associated recommendations/orders:    Problem List Items Addressed This Visit          Cardiac/Vascular    Essential hypertension    BP Readings from Last 3 Encounters:   10/03/24 138/80   24 (!) 156/76   24 138/74     - pt noncompliant with medication. Only taking PRN. Encouraged to take daily as prescribed.  -continue current medication regimen  -DASH diet, regular cardiovascular exercises, portion control  -weight loss  -f/u with BP logs in 2 weeks           Endocrine    Morbid (severe) obesity due to excess calories    We discussed weight issues and safe, effective ways of losing pounds, includin) diet:  low carbohydrate, low fat diet, stay away from fast food, fried and processed food, use whole grain, lot of fruits and vegetables, use healthy fat such as avocado, nuts and olive oil in reasonable quantity, stay away from sodas. Regular meals with lean proteins.  2) physical activity: ideally 150 min a week, with cardiovascular and resistance activity.  Patient was encouraged to set realistic attainable goals for weight loss, and we will follow up periodically.    Discussed Mediterranean Diet recommendations (Adopted from Janet et al, NEJ, 2018.)  - Eat primarily plant-based foods, such as fruits and vegetables, whole grains, legumes (beans) and nuts  - Limit refined carbohydrates (white pasta, bread, rice).  - Replace butter with healthy fats such as olive oil.  - Use herbs and  spices instead of salt to flavor foods.  - Limit red meat and processed meats to no more than a few times a month.  - Avoid sugary sodas, bakery goods, and sweets.  - Eat fish and poultry at least twice a week.     Currently prescribed tirzepatide      Overview     -aware of need for drastic weight loss.  Endorse poor eating habits.  -follow up with pcp.           Type 2 diabetes mellitus with hyperglycemia, without long-term current use of insulin    Currently prescribed tirzepatide  Diabetic eye cam ordered    Relevant Orders    Diabetic Eye Screening Photo       Other    Decreased activities of daily living (ADL)    She is using a cane.    JOSUÉ on CPAP    CPAP compliance: yes.   The patient reports that they continue to benefit from regular use of their CPAP machine.  We discussed the potential ramifications of untreated sleep apnea, which could include daytime sleepiness, hypertension, heart disease including CHF, sudden death while sleeping and/or stroke. The patient was advised to abstain from driving should they feel sleepy or drowsy.  We discussed potential treatment options, which could include weight loss, body positioning, continuous positive airway pressure (CPAP), or referral for surgical consideration.       Overview     -ahi of 49  -current with [facility] Apap.  Doing well well with good compliance.97%>4 hours.  Residual ahi of 0.4.  Patient is benefiting from apap.                Other Visit Diagnoses       Encounter for preventive health examination    -  Primary    Counseled on age appropriate medical preventative services including age appropriate cancer screenings, age appropriate eye and dental exams, over all nutritional health, need for a consistent exercise regimen, and an over all push towards maintaining a vigorous and active lifestyle.  Counseled on age appropriate vaccines and discussed upcoming health care needs based on age/gender. Discussed good sleep hygiene and stress management.       Encounter for Medicare annual wellness exam        I offered to discuss advanced care planning, including how to pick a person who would make decisions for you if you were unable to make them for yourself, called a health care power of , and what kind of decisions you might make such as use of life sustaining treatments such as ventilators and tube feeding when faced with a life limiting illness recorded on a living will that they will need to know. (How you want to be cared for as you near the end of your natural life)     X Patient is interested in learning more about how to make advanced directives.  I provided them paperwork and offered to discuss this with them.      Advanced directives, counseling/discussion        I offered to discuss advanced care planning, including how to pick a person who would make decisions for you if you were unable to make them for yourself, called a health care power of , and what kind of decisions you might make such as use of life sustaining treatments such as ventilators and tube feeding when faced with a life limiting illness recorded on a living will that they will need to know. (How you want to be cared for as you near the end of your natural life)     X Patient is interested in learning more about how to make advanced directives.  I provided them paperwork and offered to discuss this with them.      Need for shingles vaccine        Relevant Medications    varicella-zoster gE-AS01B, PF, (SHINGRIX, PF,) 50 mcg/0.5 mL injection              --------------------------------------------    ** See Completed Assessments for Annual Wellness Visit within the encounter summary.**    The following assessments were completed:  Living Situation  CAGE  Depression Screening  Timed Get Up and Go  Whisper Test  Cognitive Function Screening  Nutrition Screening  ADL Screening  PAQ Screening      Provided Hilary Wilson  with a 5-10 year written screening schedule and  personal prevention plan. Recommendations were developed using the USPSTF age appropriate recommendations. Education, counseling, and referrals were provided as needed. After Visit Summary printed and given to patient which includes a list of additional screenings\tests needed.    Review for Opioid Screening: Pt does not have Rx for Opioids     Review for Substance Use Disorders: Patient does not abuse use substances    Exam     Review of Systems:  (as noted above)  Review of Systems   Constitutional:  Negative for fever.   HENT:  Negative for trouble swallowing.    Eyes:  Negative for visual disturbance.   Respiratory:  Negative for chest tightness and shortness of breath.    Cardiovascular:  Negative for chest pain.   Gastrointestinal:  Negative for blood in stool.   Musculoskeletal:  Positive for gait problem.       Physical Exam:   Physical Exam  Constitutional:       General: She is not in acute distress.     Appearance: She is obese. She is not ill-appearing or diaphoretic.   HENT:      Head: Normocephalic and atraumatic.   Cardiovascular:      Rate and Rhythm: Normal rate and regular rhythm.   Pulmonary:      Effort: Pulmonary effort is normal. No respiratory distress.   Chest:      Chest wall: No tenderness.   Neurological:      General: No focal deficit present.      Mental Status: She is alert and oriented to person, place, and time.      Gait: Gait abnormal.       Vitals:    10/03/24 1453   BP: (!) 154/78   BP Location: Right forearm   Patient Position: Sitting   Pulse: 97   Temp: 98.2 °F (36.8 °C)   TempSrc: Oral   SpO2: 99%   Weight: (!) 154.8 kg (341 lb 4.4 oz)      Body mass index is 62.42 kg/m².    Clock:              Health Maintenance:  Health Maintenance         Date Due Completion Date    Foot Exam Never done ---    TETANUS VACCINE Never done ---    Shingles Vaccine (1 of 2) Never done ---    Low Dose Statin Never done ---    RSV Vaccine (Age 60+ and Pregnant patients) (1 - Risk 60-74 years  1-dose series) Never done ---    Eye Exam 11/08/2023 11/8/2022    DEXA Scan 07/29/2024 7/29/2021    Influenza Vaccine (1) Never done ---    COVID-19 Vaccine (4 - 2024-25 season) 09/01/2024 12/23/2021    Hemoglobin A1c 01/30/2025 7/30/2024    Mammogram 05/15/2025 5/15/2024    Diabetes Urine Screening 07/30/2025 7/30/2024    Lipid Panel 07/30/2025 7/30/2024    Colorectal Cancer Screening 01/28/2027 1/28/2022    Override on 5/7/2010: Done (Dr. Roberts, [facility])            Shingles vaccine ordered, Diabetic eye screening ordered, and Advised patient on the importance of completing overdue health maintenance items      Follow Up:  No follow-ups on file.    History     Past Medical History:  Past Medical History:   Diagnosis Date    Asthma     Diabetes mellitus     Hyperlipidemia     Hypertension     JOSUÉ on CPAP     Uterine cancer     Vaginal delivery     x3       Past Surgical History:  Past Surgical History:   Procedure Laterality Date    BREAST BIOPSY Left     COLONOSCOPY N/A 11/03/2016    Procedure: COLONOSCOPY;  Surgeon: Zhang Diez MD;  Location: [facility];  Service: Endoscopy;  Laterality: N/A;    HYSTERECTOMY  03/2019    HYSTEROSCOPY WITH DILATION AND CURETTAGE OF UTERUS N/A 01/25/2019    Procedure: HYSTEROSCOPY, WITH DILATION AND CURETTAGE OF UTERUS;  Surgeon: Elías Nolasco MD;  Location: [facility] OR;  Service: OB/GYN;  Laterality: N/A;  RN PRE OP 1-22-19---BMI--61.3  TUCLEAR MYOSURE TAHIRA PRECHTER [phone] SPOKE TO HER ON 1-23-19 @ 12:23PM    mass removal      OOPHORECTOMY  03/2019    MA REMOVAL OF OVARY/TUBE(S)      ROBOT-ASSISTED LAPAROSCOPIC ABDOMINAL HYSTERECTOMY USING DA MALCOLM XI N/A 03/19/2019    Procedure: XI ROBOTIC HYSTERECTOMY;  Surgeon: Stiven Geronimo MD;  Location: [address];  Service: OB/GYN;  Laterality: N/A;  POSS DIFF INTUBATION-GLIDESCOPE NEEDED  Wt 341 lbs  T & S am of surgery    ROBOT-ASSISTED LAPAROSCOPIC SALPINGO-OOPHORECTOMY USING DA MALCOLM XI Bilateral 03/19/2019     Procedure: XI ROBOTIC SALPINGO-OOPHORECTOMY;  Surgeon: Stiven Geronimo MD;  Location: [facility] OR [address] [place] [address];  Service: OB/GYN;  Laterality: Bilateral;    TUBAL LIGATION         Social History:  Social History     Socioeconomic History    Marital status:    Tobacco Use    Smoking status: Never    Smokeless tobacco: Never   Substance and Sexual Activity    Alcohol use: No    Drug use: No    Sexual activity: Not Currently     Partners: Male     Social Drivers of Health     Financial Resource Strain: Low Risk  (10/3/2024)    Overall Financial Resource Strain (CARDIA)     Difficulty of Paying Living Expenses: Not hard at all   Food Insecurity: No Food Insecurity (10/3/2024)    Hunger Vital Sign     Worried About Running Out of Food in the Last Year: Never true     Ran Out of Food in the Last Year: Never true   Transportation Needs: No Transportation Needs (10/3/2024)    PRAPARE - Transportation     Lack of Transportation (Medical): No     Lack of Transportation (Non-Medical): No   Physical Activity: Insufficiently Active (10/3/2024)    Exercise Vital Sign     Days of Exercise per Week: 1 day     Minutes of Exercise per Session: 10 min   Stress: No Stress Concern Present (10/3/2024)    [place] [employer] - Occupational Stress Questionnaire     Feeling of Stress : Not at all   Housing Stability: Low Risk  (10/3/2024)    Housing Stability Vital Sign     Unable to Pay for Housing in the Last Year: No     Homeless in the Last Year: No       Family History:  Family History   Problem Relation Name Age of Onset    Hypertension Mother      Kidney disease Mother      Diabetes Father      Stroke Father      Heart disease Sister  49       Allergies and Medications: (updated and reviewed)  Review of patient's allergies indicates:  No Known Allergies  Current Outpatient Medications   Medication Sig Dispense Refill    albuterol (PROVENTIL/VENTOLIN HFA) 90 mcg/actuation inhaler INHALE 2 PUFFS ORALLY EVERY SIX HOURS  AS NEEDED FOR WHEEZING 25.5 g 0    hydroCHLOROthiazide (HYDRODIURIL) 25 MG tablet TAKE 1 TABLET BY MOUTH EVERY DAY 90 tablet 3    mupirocin (BACTROBAN) 2 % ointment Apply topically once daily. 30 g 3    tirzepatide 10 mg/0.5 mL PnIj Inject 10 mg into the skin every 7 days. 12 Pen 0    varicella-zoster gE-AS01B, PF, (SHINGRIX, PF,) 50 mcg/0.5 mL injection Inject 0.5 mLs into the muscle once. for 1 dose (Patient not taking: Reported on 10/3/2024) 1 each 0     No current facility-administered medications for this visit.           - The patient is given an After Visit Summary that lists all medications with directions, allergies, education, orders placed during this encounter and follow-up instructions.      - I have reviewed the patient's medical information including past medical, family, and social history sections including the medications and allergies.      - We discussed the patient's current medications.     This note was created by combination of typed  and MModal dictation.  Transcription errors may be present.  If there are any questions, please contact me.       Juan Dixon NP

## 2024-10-03 NOTE — PATIENT INSTRUCTIONS
Counseling and Referral of Other Preventative  (Italic type indicates deductible and co-insurance are waived)    Patient Name: Hilary Wilson  Today's Date: 10/3/2024    Health Maintenance       Date Due Completion Date    Foot Exam Never done ---    TETANUS VACCINE Never done ---    Shingles Vaccine (1 of 2) Never done ---    Low Dose Statin Never done ---    RSV Vaccine (Age 60+ and Pregnant patients) (1 - Risk 60-74 years 1-dose series) Never done ---    Eye Exam 11/08/2023 11/8/2022    DEXA Scan 07/29/2024 7/29/2021    Influenza Vaccine (1) Never done ---    COVID-19 Vaccine (4 - 2024-25 season) 09/01/2024 12/23/2021    Hemoglobin A1c 01/30/2025 7/30/2024    Mammogram 05/15/2025 5/15/2024    Diabetes Urine Screening 07/30/2025 7/30/2024    Lipid Panel 07/30/2025 7/30/2024    Colorectal Cancer Screening 01/28/2027 1/28/2022    Override on 5/7/2010: Done (Dr. Roberts, [facility])        No orders of the defined types were placed in this encounter.    The following information is provided to all patients.  This information is to help you find resources for any of the problems found today that may be affecting your health:                  Living healthy guide: www.[facility].[place].gov      Understanding Diabetes: www.diabetes.org      Eating healthy: www.cdc.gov/healthyweight      CDC home safety checklist: www.cdc.gov/steadi/patient.html      Agency on Aging: www.goea.[place].gov      Alcoholics anonymous (AA): www.aa.org      Physical Activity: www.rai.nih.gov/wz8xvbh      Tobacco use: www.quitwithusla.org

## 2024-10-03 NOTE — TELEPHONE ENCOUNTER
----- Message from Juan Dixon NP sent at 10/3/2024  3:19 PM CDT -----  F/u x 2 weeks for RN BP recheck.

## 2024-10-23 ENCOUNTER — TELEPHONE (OUTPATIENT)
Dept: FAMILY MEDICINE | Facility: CLINIC | Age: 69
End: 2024-10-23
Payer: MEDICARE

## 2024-10-23 DIAGNOSIS — I10 ESSENTIAL HYPERTENSION: Primary | ICD-10-CM

## 2024-10-23 RX ORDER — HYDROCHLOROTHIAZIDE 25 MG/1
25 TABLET ORAL DAILY
Qty: 90 TABLET | Refills: 1 | Status: SHIPPED | OUTPATIENT
Start: 2024-10-23

## 2024-10-23 NOTE — TELEPHONE ENCOUNTER
No care due was identified.  [facility] Embedded Care Due Messages. Reference number: [id number].   10/23/2024 2:58:30 PM CDT

## 2024-10-23 NOTE — TELEPHONE ENCOUNTER
----- Message from Shoaib sent at 10/23/2024  1:48 PM CDT -----  Regarding: Self   Type: RX Refill Request    Who Called: Self    Have you contacted your pharmacy: yes     Refill    RX Name and Strength:tirzepatide 10 mg/0.5 mL PnIj , hydroCHLOROthiazide (HYDRODIURIL) 25 MG tablet     Preferred Pharmacy with phone number: .    [facility]/pharmacy #[id number] - [place] - [address]  [address] [address]  Phone: [phone] Fax: [phone]          Local or Mail Order: local     Would the patient rather a call back or a response via My [facility]? Call back     Best Call Back Number:.[phone]      Additional Information:     Thank you.

## 2024-10-24 NOTE — TELEPHONE ENCOUNTER
Refill Decision Note   Hilary Wilson  is requesting a refill authorization.  Brief Assessment and Rationale for Refill:  Approve     Medication Therapy Plan:         Comments:     Note composed:10:08 PM 10/23/2024             Appointments     Last Visit   3/19/2024 Ene Boudreaux MD   Next Visit   3/19/2025 Ene Boudreaux MD

## 2024-10-30 ENCOUNTER — CLINICAL SUPPORT (OUTPATIENT)
Dept: FAMILY MEDICINE | Facility: CLINIC | Age: 69
End: 2024-10-30
Payer: MEDICARE

## 2024-10-30 VITALS — SYSTOLIC BLOOD PRESSURE: 110 MMHG | HEART RATE: 89 BPM | DIASTOLIC BLOOD PRESSURE: 68 MMHG

## 2024-10-30 DIAGNOSIS — I10 ESSENTIAL HYPERTENSION: Primary | ICD-10-CM

## 2025-02-26 ENCOUNTER — PATIENT OUTREACH (OUTPATIENT)
Dept: ADMINISTRATIVE | Facility: HOSPITAL | Age: 70
End: 2025-02-26
Payer: MEDICARE

## 2025-02-26 DIAGNOSIS — M81.0 OSTEOPOROSIS, UNSPECIFIED OSTEOPOROSIS TYPE, UNSPECIFIED PATHOLOGICAL FRACTURE PRESENCE: ICD-10-CM

## 2025-02-26 DIAGNOSIS — E11.9 TYPE 2 DIABETES MELLITUS WITHOUT COMPLICATION, WITHOUT LONG-TERM CURRENT USE OF INSULIN: ICD-10-CM

## 2025-02-26 DIAGNOSIS — Z12.31 BREAST CANCER SCREENING BY MAMMOGRAM: Primary | ICD-10-CM

## 2025-03-19 ENCOUNTER — OFFICE VISIT (OUTPATIENT)
Dept: FAMILY MEDICINE | Facility: CLINIC | Age: 70
End: 2025-03-19
Payer: MEDICARE

## 2025-03-19 VITALS
OXYGEN SATURATION: 97 % | HEIGHT: 62 IN | SYSTOLIC BLOOD PRESSURE: 118 MMHG | DIASTOLIC BLOOD PRESSURE: 76 MMHG | HEART RATE: 93 BPM | BODY MASS INDEX: 53.92 KG/M2 | WEIGHT: 293 LBS

## 2025-03-19 DIAGNOSIS — Z00.00 ANNUAL PHYSICAL EXAM: Primary | ICD-10-CM

## 2025-03-19 DIAGNOSIS — E11.65 TYPE 2 DIABETES MELLITUS WITH HYPERGLYCEMIA, WITHOUT LONG-TERM CURRENT USE OF INSULIN: ICD-10-CM

## 2025-03-19 DIAGNOSIS — E66.01 MORBID (SEVERE) OBESITY DUE TO EXCESS CALORIES: ICD-10-CM

## 2025-03-19 DIAGNOSIS — G47.33 OSA ON CPAP: ICD-10-CM

## 2025-03-19 PROCEDURE — 3074F SYST BP LT 130 MM HG: CPT | Mod: CPTII,S$GLB,, | Performed by: FAMILY MEDICINE

## 2025-03-19 PROCEDURE — 1160F RVW MEDS BY RX/DR IN RCRD: CPT | Mod: CPTII,S$GLB,, | Performed by: FAMILY MEDICINE

## 2025-03-19 PROCEDURE — 1159F MED LIST DOCD IN RCRD: CPT | Mod: CPTII,S$GLB,, | Performed by: FAMILY MEDICINE

## 2025-03-19 PROCEDURE — 1126F AMNT PAIN NOTED NONE PRSNT: CPT | Mod: CPTII,S$GLB,, | Performed by: FAMILY MEDICINE

## 2025-03-19 PROCEDURE — 99397 PER PM REEVAL EST PAT 65+ YR: CPT | Mod: S$GLB,,, | Performed by: FAMILY MEDICINE

## 2025-03-19 PROCEDURE — 3008F BODY MASS INDEX DOCD: CPT | Mod: CPTII,S$GLB,, | Performed by: FAMILY MEDICINE

## 2025-03-19 PROCEDURE — 3078F DIAST BP <80 MM HG: CPT | Mod: CPTII,S$GLB,, | Performed by: FAMILY MEDICINE

## 2025-03-19 PROCEDURE — 99999 PR PBB SHADOW E&M-EST. PATIENT-LVL III: CPT | Mod: PBBFAC,,, | Performed by: FAMILY MEDICINE

## 2025-03-19 PROCEDURE — 3288F FALL RISK ASSESSMENT DOCD: CPT | Mod: CPTII,S$GLB,, | Performed by: FAMILY MEDICINE

## 2025-03-19 PROCEDURE — 1101F PT FALLS ASSESS-DOCD LE1/YR: CPT | Mod: CPTII,S$GLB,, | Performed by: FAMILY MEDICINE

## 2025-03-19 NOTE — PROGRESS NOTES
Assessment & Plan  Problem List Items Addressed This Visit          Endocrine    Type 2 diabetes mellitus with hyperglycemia, without long-term current use of insulin    Relevant Orders    Comprehensive Metabolic Panel    CBC Auto Differential    Hemoglobin A1C    Lipid Panel    TSH    Morbid (severe) obesity due to excess calories    Overview   -aware of need for drastic weight loss.  Endorse poor eating habits.  -follow up with pcp.           Current Assessment & Plan   The patient is asked to make an attempt to improve diet and exercise patterns to aid in medical management of this problem.              Other    JOSUÉ on CPAP    Overview   -ahi of 49  -current with [employer] Apap.  Doing well well with good compliance.97%>4 hours.  Residual ahi of 0.4.  Patient is benefiting from apap.               Relevant Orders    Comprehensive Metabolic Panel    CBC Auto Differential    Hemoglobin A1C    Lipid Panel    TSH     Other Visit Diagnoses         Annual physical exam    -  Primary    Relevant Orders    Comprehensive Metabolic Panel    CBC Auto Differential    Hemoglobin A1C    Lipid Panel    TSH         I addressed all major concerns as it related to health maintenance.  All were ordered and scheduled based on the patients wishes.  Any additional health maintenance will be readdressed at the next physical if declined or deferred by the patient.    Assessment & Plan  1. Diabetes mellitus.  She has not been checking her blood sugars and reports no symptoms of hypoglycemia. Blood work has been ordered to monitor her condition. She will continue her current medication regimen without the injectable medication, as she feels fine without it. She is advised to schedule an appointment with Dr. Murrell for an eye examination, especially given her history of cataracts. If the blood work results indicate any issues, the injectable medication will be reconsidered.    2. Hypercholesterolemia.  She previously experienced muscle pain  with simvastatin. A low dose of Crestor (rosuvastatin) will be prescribed as it typically causes fewer side effects. She is advised to maintain a healthy diet, avoiding high-fat foods, chocolate, and pasta. If she experiences any muscle pain with Crestor, the medication will be discontinued.    3. Health maintenance.  She declined the influenza vaccine but is encouraged to continue wearing a mask to reduce exposure to the flu and other illnesses. Blood work has been ordered to monitor her overall health.    Results        Health Maintenance reviewed.      ______________________________________________________________________    Chief Complaint  Chief Complaint   Patient presents with    Annual Exam       HPI  Hilary Wilson is a 69 y.o. female with multiple medical diagnoses as listed in the medical history and problem list that presents for annual exam.  Pt is known to me with last appointment 3/19/2024 .    History of Present Illness  The patient presents for evaluation of diabetes, hypercholesterolemia, and health maintenance.    She has not been monitoring her blood glucose levels at home and reports no episodes of hypoglycemia characterized by dizziness or near-fall experiences. She has discontinued the use of injectable medications since October 2024, attributing this to an unknown cause. Despite this, she reports feeling well and does not believe she requires these medications. She has not had any recent ophthalmological examinations but plans to schedule an appointment with Dr. Murrell, who specializes in diabetic eye care. She has a long-standing diagnosis of cataracts.    She has previously experienced adverse effects from simvastatin, including pain and cracking, leading her to discontinue its use. She has since made dietary modifications, avoiding high-fat foods, chocolate, and pasta.    She reports no recent falls, fevers, chills, or night sweats. She is unable to undergo blood work today due to  having consumed a sandwich to facilitate the intake of a pain medication. She reports no urinary incontinence. She declines the influenza vaccine and continues to wear a mask for protection. She has been maintaining hydration with cranberry juice. She requests an examination of her ears, despite not experiencing any pressure, as she has noticed a decrease in her hearing acuity.    MEDICATIONS  Discontinued: Simvastatin    IMMUNIZATIONS  She declined the influenza vaccine.   Patient denies any new symptoms including chest pain, SOB, blurry vision, N/V, diarrhea.        PAST MEDICAL HISTORY:  Past Medical History:   Diagnosis Date    Asthma     Diabetes mellitus     Hyperlipidemia     Hypertension     JOSUÉ on CPAP     Uterine cancer     Vaginal delivery     x3       PAST SURGICAL HISTORY:  Past Surgical History:   Procedure Laterality Date    BREAST BIOPSY Left     COLONOSCOPY N/A 11/03/2016    Procedure: COLONOSCOPY;  Surgeon: Zhang Diez MD;  Location: [facility];  Service: Endoscopy;  Laterality: N/A;    HYSTERECTOMY  03/2019    HYSTEROSCOPY WITH DILATION AND CURETTAGE OF UTERUS N/A 01/25/2019    Procedure: HYSTEROSCOPY, WITH DILATION AND CURETTAGE OF UTERUS;  Surgeon: Elías Nolasco MD;  Location: [facility] OR;  Service: OB/GYN;  Laterality: N/A;  RN PRE OP 1-22-19---BMI--61.3  TUCLEAR MYOSURE TAHIRA PRECHTER [phone] SPOKE TO HER ON 1-23-19 @ 12:23PM    mass removal      OOPHORECTOMY  03/2019    CA REMOVAL OF OVARY/TUBE(S)      ROBOT-ASSISTED LAPAROSCOPIC ABDOMINAL HYSTERECTOMY USING DA MALCOLM XI N/A 03/19/2019    Procedure: XI ROBOTIC HYSTERECTOMY;  Surgeon: Stiven Geronimo MD;  Location: [address];  Service: OB/GYN;  Laterality: N/A;  POSS DIFF INTUBATION-GLIDESCOPE NEEDED  Wt 341 lbs  T & S am of surgery    ROBOT-ASSISTED LAPAROSCOPIC SALPINGO-OOPHORECTOMY USING DA MALCOLM XI Bilateral 03/19/2019    Procedure: XI ROBOTIC SALPINGO-OOPHORECTOMY;  Surgeon: Stiven Geronimo MD;  Location: [address]  "[facility];  Service: OB/GYN;  Laterality: Bilateral;    TUBAL LIGATION         SOCIAL HISTORY:  Social History[1]    FAMILY HISTORY:  Family History   Problem Relation Name Age of Onset    Hypertension Mother      Kidney disease Mother      Diabetes Father      Stroke Father      Heart disease Sister  49       ALLERGIES AND MEDICATIONS: updated and reviewed.  Review of patient's allergies indicates:  No Known Allergies  Current Medications[2]      ROS  Review of Systems   Constitutional:  Negative for activity change, appetite change, fatigue, fever and unexpected weight change.   HENT: Negative.  Negative for ear discharge, ear pain, rhinorrhea and sore throat.    Eyes: Negative.    Respiratory:  Negative for apnea, cough, chest tightness, shortness of breath and wheezing.    Cardiovascular:  Negative for chest pain, palpitations and leg swelling.   Gastrointestinal:  Negative for abdominal distention, abdominal pain, constipation, diarrhea and vomiting.   Endocrine: Negative for cold intolerance, heat intolerance, polydipsia and polyuria.   Genitourinary:  Negative for decreased urine volume and urgency.   Musculoskeletal: Negative.    Skin:  Negative for rash.   Neurological:  Negative for dizziness and headaches.   Hematological:  Does not bruise/bleed easily.   Psychiatric/Behavioral:  Negative for agitation, sleep disturbance and suicidal ideas.            Physical Exam  Vitals:    03/19/25 0950   BP: 118/76   Pulse: 93   SpO2: 97%   Weight: (!) 151.9 kg (334 lb 14.1 oz)   Height: 5' 2" (1.575 m)    Body mass index is 61.25 kg/m².  Weight: (!) 151.9 kg (334 lb 14.1 oz)   Height: 5' 2" (157.5 cm)   Physical Exam  Vitals reviewed.   Constitutional:       Appearance: Normal appearance. She is well-developed.   HENT:      Head: Normocephalic and atraumatic.      Right Ear: External ear normal.      Left Ear: External ear normal.      Nose: Nose normal.      Mouth/Throat:      Mouth: Mucous membranes are moist.      " Pharynx: Oropharynx is clear.   Eyes:      Extraocular Movements: Extraocular movements intact.      Conjunctiva/sclera: Conjunctivae normal.      Pupils: Pupils are equal, round, and reactive to light.   Cardiovascular:      Rate and Rhythm: Normal rate and regular rhythm.      Heart sounds: Normal heart sounds.   Pulmonary:      Effort: Pulmonary effort is normal.      Breath sounds: Normal breath sounds.   Skin:     General: Skin is warm and dry.   Neurological:      Mental Status: She is alert and oriented to person, place, and time.        Physical Exam  Ears are clear with no wax. Eardrums appear normal.  Lungs are clear.  Heart sounds are normal.         Health Maintenance         Date Due Completion Date    Foot Exam Never done ---    TETANUS VACCINE Never done ---    Shingles Vaccine (1 of 2) Never done ---    Low Dose Statin Never done ---    RSV Vaccine (Age 60+ and Pregnant patients) (1 - Risk 60-74 years 1-dose series) Never done ---    Diabetic Eye Exam 11/08/2023 11/8/2022    DEXA Scan 07/29/2024 7/29/2021    Influenza Vaccine (1) Never done ---    COVID-19 Vaccine (4 - 2024-25 season) 09/01/2024 12/23/2021    Hemoglobin A1c 01/30/2025 7/30/2024    Mammogram 05/15/2025 5/15/2024    Diabetes Urine Screening 07/30/2025 7/30/2024    Lipid Panel 07/30/2025 7/30/2024    Colorectal Cancer Screening 01/28/2027 1/28/2022    Override on 5/7/2010: Done (Dr. Roberts, [place] )                Patient note was created using [employer].  Any errors in syntax or even information may not have been identified and edited on initial review prior to signing this note.         [1]   Social History  Socioeconomic History    Marital status:    Tobacco Use    Smoking status: Never    Smokeless tobacco: Never   Substance and Sexual Activity    Alcohol use: No    Drug use: No    Sexual activity: Not Currently     Partners: Male     Social Drivers of Health     Financial Resource Strain: Low Risk  (10/3/2024)    Overall  Financial Resource Strain (CARDIA)     Difficulty of Paying Living Expenses: Not hard at all   Food Insecurity: No Food Insecurity (10/3/2024)    Hunger Vital Sign     Worried About Running Out of Food in the Last Year: Never true     Ran Out of Food in the Last Year: Never true   Transportation Needs: No Transportation Needs (10/3/2024)    PRAPARE - Transportation     Lack of Transportation (Medical): No     Lack of Transportation (Non-Medical): No   Physical Activity: Insufficiently Active (10/3/2024)    Exercise Vital Sign     Days of Exercise per Week: 1 day     Minutes of Exercise per Session: 10 min   Stress: No Stress Concern Present (10/3/2024)    [employer] - Occupational Stress Questionnaire     Feeling of Stress : Not at all   Housing Stability: Unknown (10/3/2024)    Housing Stability Vital Sign     Unable to Pay for Housing in the Last Year: No     Homeless in the Last Year: No   [2]   Current Outpatient Medications   Medication Sig Dispense Refill    albuterol (PROVENTIL/VENTOLIN HFA) 90 mcg/actuation inhaler INHALE 2 PUFFS ORALLY EVERY SIX HOURS AS NEEDED FOR WHEEZING 25.5 g 0    hydroCHLOROthiazide (HYDRODIURIL) 25 MG tablet Take 1 tablet (25 mg total) by mouth once daily. 90 tablet 1    mupirocin (BACTROBAN) 2 % ointment Apply topically once daily. 30 g 3    tirzepatide 10 mg/0.5 mL PnIj Inject 10 mg into the skin every 7 days. (Patient not taking: Reported on 3/19/2025) 12 Pen 0     No current facility-administered medications for this visit.

## 2025-04-21 NOTE — PROGRESS NOTES
History & Physical  Gynecology      SUBJECTIVE:     Chief Complaint: Vaginal Bleeding (3 weeks )       History of Present Illness:  Ms. Wilson is a 63 year old female who presents to clinic c/o vaginal bleeding x 3 weeks. She reports that she would stop for a day or so but mostly has been constant. She reports that she uses 2 pads at one time. She reports that she changes the pads 3 times a day. She reports that she is passing blood clots up to the size of a golf ball. She also reports that she  she recalls soiling her clothing. She denies hot flashes, night sweats, and a 12 month period of no menses. She reports that before this episode she would have normal menses monthly for 2-3 days. She reports that her periods only stopped for 3 months a few years ago.     Review of patient's allergies indicates:  No Known Allergies    Past Medical History:   Diagnosis Date    Asthma     Hyperlipidemia     Hypertension     JOSUÉ on CPAP      Past Surgical History:   Procedure Laterality Date    BREAST BIOPSY Left     COLONOSCOPY N/A 11/3/2016    [facility]    mass removal      TUBAL LIGATION       OB History      Para Term  AB Living    3 3 3     3    SAB TAB Ectopic Multiple Live Births                     Family History   Problem Relation Age of Onset    Hypertension Mother     Kidney disease Mother     Diabetes Father     Stroke Father     Heart disease Sister 49    Kidney disease Sister     Stroke Sister         in 50s     Social History     Tobacco Use    Smoking status: Never Smoker    Smokeless tobacco: Never Used   Substance Use Topics    Alcohol use: No    Drug use: No       Current Outpatient Medications   Medication Sig    albuterol (PROAIR HFA) 90 mcg/actuation inhaler Inhale 2 puffs into the lungs every 6 (six) hours as needed for Wheezing.    hydroCHLOROthiazide (HYDRODIURIL) 25 MG tablet Take 1 tablet (25 mg total) by mouth once daily.     phentermine 15 MG capsule Take 1 capsule (15 mg total) by mouth every morning.    [START ON 1/12/2019] phentermine 15 MG capsule Take 1 capsule (15 mg total) by mouth every morning.    [START ON 2/11/2019] phentermine 15 MG capsule Take 1 capsule (15 mg total) by mouth every morning.    potassium chloride (MICRO-K) 8 mEq CpSR Take 1 capsule (8 mEq total) by mouth once daily.    etodolac (LODINE) 400 MG tablet Take 1 tablet (400 mg total) by mouth 2 (two) times daily.    furosemide (LASIX) 20 MG tablet Take 1 tablet (20 mg total) by mouth once daily.    ibuprofen (ADVIL,MOTRIN) 800 MG tablet Take 1 tablet (800 mg total) by mouth 3 (three) times daily.    mupirocin (BACTROBAN) 2 % ointment Apply topically 3 (three) times daily.     No current facility-administered medications for this visit.      Review of Systems:  Review of Systems   Constitutional: Negative for chills and fever.   Eyes: Negative for visual disturbance.   Respiratory: Negative for cough and wheezing.    Cardiovascular: Negative for chest pain and palpitations.   Gastrointestinal: Negative for abdominal pain, nausea and vomiting.   Genitourinary: Positive for menorrhagia and menstrual problem. Negative for dysuria, frequency, hematuria, pelvic pain, vaginal bleeding, vaginal discharge and vaginal pain.   Neurological: Negative for headaches.        OBJECTIVE:     Physical Exam:  Physical Exam   Constitutional: She is oriented to person, place, and time. She appears well-developed and well-nourished. No distress.   Cardiovascular: Normal rate and normal heart sounds.   Pulmonary/Chest: Effort normal. No respiratory distress.   Genitourinary:   Genitourinary Comments: Bright red blood in vaginal vault. Uterus difficult to palpate 2/2 body habitus   Neurological: She is alert and oriented to person, place, and time.   Skin: Skin is warm and dry.   Psychiatric: She has a normal mood and affect.   Vitals reviewed.    ASSESSMENT:       ICD-10-CM  ICD-9-CM    1. Abnormal uterine bleeding (AUB) N93.9 626.9 Vaginosis Screen by DNA Probe      C. trachomatis/N. gonorrhoeae by AMP DNA      US Pelvis Comp with Transvag NON-OB (xpd   2. Unspecified menopausal and perimenopausal disorder N95.9 627.9 Follicle stimulating hormone      Luteinizing hormone      Estradiol     Plan:      Hilary was seen today for vaginal bleeding.    Diagnoses and all orders for this visit:    Abnormal uterine bleeding (AUB)  -     Vaginosis Screen by DNA Probe  -     C. trachomatis/N. gonorrhoeae by AMP DNA  -     US Pelvis Comp with Transvag NON-OB (xpd; Future    Unspecified menopausal and perimenopausal disorder  -     Follicle stimulating hormone; Future  -     Luteinizing hormone; Future  -     Estradiol; Future      Orders Placed This Encounter   Procedures    Vaginosis Screen by DNA Probe    C. trachomatis/N. gonorrhoeae by AMP DNA    US Pelvis Comp with Transvag NON-OB (xpd    Follicle stimulating hormone    Luteinizing hormone    Estradiol       Follow-up in about 12 days (around 1/8/2019).    Counseling time: 30 minutes    Elías Nolasco   No

## 2025-04-25 ENCOUNTER — TELEPHONE (OUTPATIENT)
Dept: GYNECOLOGIC ONCOLOGY | Facility: CLINIC | Age: 70
End: 2025-04-25
Payer: MEDICARE

## 2025-05-09 ENCOUNTER — TELEPHONE (OUTPATIENT)
Dept: GYNECOLOGIC ONCOLOGY | Facility: CLINIC | Age: 70
End: 2025-05-09
Payer: MEDICARE

## 2025-05-12 ENCOUNTER — OFFICE VISIT (OUTPATIENT)
Dept: GYNECOLOGIC ONCOLOGY | Facility: CLINIC | Age: 70
End: 2025-05-12
Payer: MEDICARE

## 2025-05-12 VITALS
HEART RATE: 86 BPM | DIASTOLIC BLOOD PRESSURE: 73 MMHG | SYSTOLIC BLOOD PRESSURE: 166 MMHG | BODY MASS INDEX: 62.37 KG/M2 | WEIGHT: 293 LBS

## 2025-05-12 DIAGNOSIS — C54.1 ENDOMETRIAL CANCER: Primary | ICD-10-CM

## 2025-05-12 PROCEDURE — 1160F RVW MEDS BY RX/DR IN RCRD: CPT | Mod: CPTII,S$GLB,, | Performed by: OBSTETRICS & GYNECOLOGY

## 2025-05-12 PROCEDURE — 3078F DIAST BP <80 MM HG: CPT | Mod: CPTII,S$GLB,, | Performed by: OBSTETRICS & GYNECOLOGY

## 2025-05-12 PROCEDURE — 3077F SYST BP >= 140 MM HG: CPT | Mod: CPTII,S$GLB,, | Performed by: OBSTETRICS & GYNECOLOGY

## 2025-05-12 PROCEDURE — 1159F MED LIST DOCD IN RCRD: CPT | Mod: CPTII,S$GLB,, | Performed by: OBSTETRICS & GYNECOLOGY

## 2025-05-12 PROCEDURE — 99214 OFFICE O/P EST MOD 30 MIN: CPT | Mod: S$GLB,,, | Performed by: OBSTETRICS & GYNECOLOGY

## 2025-05-12 PROCEDURE — 3288F FALL RISK ASSESSMENT DOCD: CPT | Mod: CPTII,S$GLB,, | Performed by: OBSTETRICS & GYNECOLOGY

## 2025-05-12 PROCEDURE — 1101F PT FALLS ASSESS-DOCD LE1/YR: CPT | Mod: CPTII,S$GLB,, | Performed by: OBSTETRICS & GYNECOLOGY

## 2025-05-12 PROCEDURE — 1125F AMNT PAIN NOTED PAIN PRSNT: CPT | Mod: CPTII,S$GLB,, | Performed by: OBSTETRICS & GYNECOLOGY

## 2025-05-12 PROCEDURE — 99999 PR PBB SHADOW E&M-EST. PATIENT-LVL III: CPT | Mod: PBBFAC,,, | Performed by: OBSTETRICS & GYNECOLOGY

## 2025-05-12 PROCEDURE — 3008F BODY MASS INDEX DOCD: CPT | Mod: CPTII,S$GLB,, | Performed by: OBSTETRICS & GYNECOLOGY

## 2025-05-12 NOTE — PROGRESS NOTES
Subjective:      Patient ID: Hilary Wilson is a 70 y.o. female.    Chief Complaint: Follow-up      Treatment History  Clinical Stage IAG2 endometrial cancer (mainly in polyp, 3mm of 27 mm invasion)  RALH/BSO/Minilap for uterine extraction on 3/19/19  Morbid obesity precluding staging    Follow-up  Pertinent negatives include no abdominal pain, arthralgias, chest pain, chills, coughing, fatigue, fever, nausea, numbness, rash, sore throat, vomiting or weakness.     Here today for continued surveillance. Denies VB, F/C, N/V.  No new symptoms per patient.  Reports she has been doing well.    Review of Systems   Constitutional:  Negative for activity change, appetite change, chills, fatigue and fever.   HENT:  Negative for hearing loss, mouth sores, nosebleeds, sore throat and tinnitus.    Eyes:  Negative for visual disturbance.   Respiratory:  Negative for cough, chest tightness, shortness of breath and wheezing.    Cardiovascular:  Negative for chest pain and leg swelling.   Gastrointestinal:  Negative for abdominal distention, abdominal pain, blood in stool, constipation, diarrhea, nausea and vomiting.   Genitourinary:  Negative for dysuria, flank pain, frequency, hematuria, pelvic pain, vaginal bleeding, vaginal discharge and vaginal pain.   Musculoskeletal:  Negative for arthralgias and back pain.   Skin:  Negative for rash.   Neurological:  Negative for dizziness, seizures, syncope, weakness and numbness.   Hematological:  Does not bruise/bleed easily.   Psychiatric/Behavioral:  Negative for confusion and sleep disturbance. The patient is not nervous/anxious.      BP (!) 166/73   Pulse 86   Wt (!) 154.7 kg (341 lb)   LMP 05/23/2014 Comment: having bleeding  DUB  BMI 62.37 kg/m²     Objective:   Physical Exam:   Constitutional: She is oriented to person, place, and time. She appears well-developed and well-nourished. No distress.    HENT:   Head: Normocephalic and atraumatic.    Eyes: No scleral icterus.      Cardiovascular:       Exam reveals no cyanosis and no edema.        Pulmonary/Chest: Effort normal. No respiratory distress. She exhibits no tenderness.        Abdominal: Soft. She exhibits no distension, no fluid wave and no mass. There is no abdominal tenderness. There is no rebound and no guarding. No hernia.     Genitourinary:    Vagina normal.      Pelvic exam was performed with patient supine.   There is no rash, tenderness or lesion on the right labia. There is no rash, tenderness or lesion on the left labia. Right adnexum displays no mass, no tenderness and no fullness. Left adnexum displays no mass, no tenderness and no fullness. No vaginal discharge, bleeding (cuff without lesion) or prolapse of vaginal walls in the vagina. Cervix is absent.Uterus is absent.           Musculoskeletal: Moves all extremeties. No edema.      Lymphadenopathy:     She has no cervical adenopathy.    Neurological: She is alert and oriented to person, place, and time.    Skin: Skin is warm and dry. No cyanosis. No pallor.    Psychiatric: She has a normal mood and affect. Her behavior is normal.       Assessment:     1. Endometrial cancer        Plan:       BRITTNEY on exam today.  RTC 12 months

## 2025-05-23 ENCOUNTER — PATIENT OUTREACH (OUTPATIENT)
Dept: ADMINISTRATIVE | Facility: HOSPITAL | Age: 70
End: 2025-05-23
Payer: MEDICARE

## 2025-05-23 DIAGNOSIS — E11.9 TYPE 2 DIABETES MELLITUS WITHOUT COMPLICATION, WITHOUT LONG-TERM CURRENT USE OF INSULIN: Primary | ICD-10-CM

## 2025-07-03 ENCOUNTER — HOSPITAL ENCOUNTER (OUTPATIENT)
Dept: RADIOLOGY | Facility: HOSPITAL | Age: 70
Discharge: HOME | End: 2025-07-03
Attending: FAMILY MEDICINE
Payer: MEDICARE

## 2025-07-03 ENCOUNTER — HOSPITAL ENCOUNTER (OUTPATIENT)
Dept: RADIOLOGY | Facility: CLINIC | Age: 70
Discharge: HOME | End: 2025-07-03
Attending: FAMILY MEDICINE
Payer: MEDICARE

## 2025-07-03 DIAGNOSIS — M81.0 OSTEOPOROSIS, UNSPECIFIED OSTEOPOROSIS TYPE, UNSPECIFIED PATHOLOGICAL FRACTURE PRESENCE: ICD-10-CM

## 2025-07-03 DIAGNOSIS — Z12.31 BREAST CANCER SCREENING BY MAMMOGRAM: ICD-10-CM

## 2025-07-03 PROCEDURE — 77080 DXA BONE DENSITY AXIAL: CPT | Mod: TC,PO

## 2025-07-03 PROCEDURE — 77063 BREAST TOMOSYNTHESIS BI: CPT | Mod: TC,PO

## 2025-07-31 ENCOUNTER — LAB VISIT (OUTPATIENT)
Dept: LAB | Facility: HOSPITAL | Age: 70
End: 2025-07-31
Attending: FAMILY MEDICINE
Payer: MEDICARE

## 2025-07-31 DIAGNOSIS — E11.65 TYPE 2 DIABETES MELLITUS WITH HYPERGLYCEMIA, WITHOUT LONG-TERM CURRENT USE OF INSULIN: ICD-10-CM

## 2025-07-31 DIAGNOSIS — G47.33 OSA ON CPAP: ICD-10-CM

## 2025-07-31 DIAGNOSIS — E11.9 TYPE 2 DIABETES MELLITUS WITHOUT COMPLICATION, WITHOUT LONG-TERM CURRENT USE OF INSULIN: ICD-10-CM

## 2025-07-31 DIAGNOSIS — Z00.00 ANNUAL PHYSICAL EXAM: ICD-10-CM

## 2025-07-31 LAB
ABSOLUTE EOSINOPHIL (OHS): 0.11 K/UL
ABSOLUTE MONOCYTE (OHS): 0.54 K/UL (ref 0.3–1)
ABSOLUTE NEUTROPHIL COUNT (OHS): 4.44 K/UL (ref 1.8–7.7)
ALBUMIN SERPL BCP-MCNC: 3.6 G/DL (ref 3.5–5.2)
ALBUMIN/CREAT UR: NORMAL
ALP SERPL-CCNC: 87 UNIT/L (ref 40–150)
ALT SERPL W/O P-5'-P-CCNC: 25 UNIT/L (ref 0–55)
ANION GAP (OHS): 10 MMOL/L (ref 8–16)
AST SERPL-CCNC: 30 UNIT/L (ref 0–50)
BASOPHILS # BLD AUTO: 0.03 K/UL
BASOPHILS NFR BLD AUTO: 0.4 %
BILIRUB SERPL-MCNC: 0.5 MG/DL (ref 0.1–1)
BUN SERPL-MCNC: 20 MG/DL (ref 8–23)
CALCIUM SERPL-MCNC: 9.3 MG/DL (ref 8.7–10.5)
CHLORIDE SERPL-SCNC: 103 MMOL/L (ref 95–110)
CHOLEST SERPL-MCNC: 199 MG/DL (ref 120–199)
CHOLEST/HDLC SERPL: 5.2 {RATIO} (ref 2–5)
CO2 SERPL-SCNC: 26 MMOL/L (ref 23–29)
CREAT SERPL-MCNC: 0.7 MG/DL (ref 0.5–1.4)
CREAT UR-MCNC: 122 MG/DL (ref 15–325)
EAG (OHS): 123 MG/DL (ref 68–131)
ERYTHROCYTE [DISTWIDTH] IN BLOOD BY AUTOMATED COUNT: 14.1 % (ref 11.5–14.5)
GFR SERPLBLD CREATININE-BSD FMLA CKD-EPI: >60 ML/MIN/1.73/M2
GLUCOSE SERPL-MCNC: 107 MG/DL (ref 70–110)
HBA1C MFR BLD: 5.9 % (ref 4–5.6)
HCT VFR BLD AUTO: 41.8 % (ref 37–48.5)
HDLC SERPL-MCNC: 38 MG/DL (ref 40–75)
HDLC SERPL: 19.1 % (ref 20–50)
HGB BLD-MCNC: 13 GM/DL (ref 12–16)
IMM GRANULOCYTES # BLD AUTO: 0.02 K/UL (ref 0–0.04)
IMM GRANULOCYTES NFR BLD AUTO: 0.3 % (ref 0–0.5)
LDLC SERPL CALC-MCNC: 141.4 MG/DL (ref 63–159)
LYMPHOCYTES # BLD AUTO: 1.82 K/UL (ref 1–4.8)
MCH RBC QN AUTO: 26.9 PG (ref 27–31)
MCHC RBC AUTO-ENTMCNC: 31.1 G/DL (ref 32–36)
MCV RBC AUTO: 86 FL (ref 82–98)
MICROALBUMIN UR-MCNC: <5 UG/ML (ref ?–5000)
NONHDLC SERPL-MCNC: 161 MG/DL
NUCLEATED RBC (/100WBC) (OHS): 0 /100 WBC
PLATELET # BLD AUTO: 186 K/UL (ref 150–450)
PMV BLD AUTO: 12 FL (ref 9.2–12.9)
POTASSIUM SERPL-SCNC: 3.8 MMOL/L (ref 3.5–5.1)
PROT SERPL-MCNC: 7.9 GM/DL (ref 6–8.4)
RBC # BLD AUTO: 4.84 M/UL (ref 4–5.4)
RELATIVE EOSINOPHIL (OHS): 1.6 %
RELATIVE LYMPHOCYTE (OHS): 26.1 % (ref 18–48)
RELATIVE MONOCYTE (OHS): 7.8 % (ref 4–15)
RELATIVE NEUTROPHIL (OHS): 63.8 % (ref 38–73)
SODIUM SERPL-SCNC: 139 MMOL/L (ref 136–145)
TRIGL SERPL-MCNC: 98 MG/DL (ref 30–150)
TSH SERPL-ACNC: 1.27 UIU/ML (ref 0.4–4)
WBC # BLD AUTO: 6.96 K/UL (ref 3.9–12.7)

## 2025-07-31 PROCEDURE — 84443 ASSAY THYROID STIM HORMONE: CPT

## 2025-07-31 PROCEDURE — 36415 COLL VENOUS BLD VENIPUNCTURE: CPT | Mod: PO

## 2025-07-31 PROCEDURE — 83036 HEMOGLOBIN GLYCOSYLATED A1C: CPT

## 2025-07-31 PROCEDURE — 82570 ASSAY OF URINE CREATININE: CPT

## 2025-07-31 PROCEDURE — 84075 ASSAY ALKALINE PHOSPHATASE: CPT

## 2025-07-31 PROCEDURE — 85025 COMPLETE CBC W/AUTO DIFF WBC: CPT

## 2025-07-31 PROCEDURE — 82465 ASSAY BLD/SERUM CHOLESTEROL: CPT

## 2025-09-05 ENCOUNTER — TELEPHONE (OUTPATIENT)
Dept: FAMILY MEDICINE | Facility: CLINIC | Age: 70
End: 2025-09-05
Payer: MEDICARE

## 2025-09-05 ENCOUNTER — OFFICE VISIT (OUTPATIENT)
Dept: PULMONOLOGY | Facility: CLINIC | Age: 70
End: 2025-09-05
Payer: MEDICARE

## 2025-09-05 VITALS
DIASTOLIC BLOOD PRESSURE: 86 MMHG | HEIGHT: 62 IN | HEART RATE: 103 BPM | WEIGHT: 293 LBS | OXYGEN SATURATION: 96 % | SYSTOLIC BLOOD PRESSURE: 152 MMHG | BODY MASS INDEX: 53.92 KG/M2

## 2025-09-05 DIAGNOSIS — R06.09 DYSPNEA ON EXERTION: ICD-10-CM

## 2025-09-05 DIAGNOSIS — Z71.89 GOALS OF CARE, COUNSELING/DISCUSSION: ICD-10-CM

## 2025-09-05 DIAGNOSIS — E66.01 MORBID (SEVERE) OBESITY DUE TO EXCESS CALORIES: ICD-10-CM

## 2025-09-05 DIAGNOSIS — G47.33 OSA ON CPAP: Primary | ICD-10-CM

## 2025-09-05 PROCEDURE — 99999 PR PBB SHADOW E&M-EST. PATIENT-LVL III: CPT | Mod: PBBFAC,,, | Performed by: INTERNAL MEDICINE

## (undated) DEVICE — KIT POWDER ABSORBABLE GELATIN

## (undated) DEVICE — DRAPE COLUMN DAVINCI XI

## (undated) DEVICE — SOL NS 1000CC

## (undated) DEVICE — APPLICATOR ARISTA FLEX XL

## (undated) DEVICE — SOL CLEARIFY VISUALIZATION LAP

## (undated) DEVICE — TUBING HYSTEROSCOPIC OUTFLOW

## (undated) DEVICE — Device

## (undated) DEVICE — IRRIGATOR ENDOSCOPY DISP.

## (undated) DEVICE — PAD SANITARY OB STERILE

## (undated) DEVICE — MANIPULATOR VCARE PLUS 34MM

## (undated) DEVICE — LEGGINGS 48X31 INCH

## (undated) DEVICE — OBTURATOR BLADELESS 8MM XI

## (undated) DEVICE — GLOVE PROTEXIS LTX PF SURG 7.5

## (undated) DEVICE — SEE MEDLINE ITEM 154981

## (undated) DEVICE — SEE L#152161

## (undated) DEVICE — SEE MEDLINE ITEM 157117

## (undated) DEVICE — KIT ANTIFOG

## (undated) DEVICE — SOL ELECTROLUBE ANTI-STIC

## (undated) DEVICE — SEE MEDLINE ITEM 157181

## (undated) DEVICE — GLOVE SURGICAL LATEX SZ 6.5

## (undated) DEVICE — SET CYSTO IRRIGATION UNIV SPIK

## (undated) DEVICE — NDL INSUF ULTRA VERESS 120MM

## (undated) DEVICE — SEAL UNIVERSAL 5MM-8MM XI

## (undated) DEVICE — GLOVE BIOGEL PI MICRO SZ 6

## (undated) DEVICE — GLOVE PROTEXIS NEOPRENE 7.5

## (undated) DEVICE — DRAPE ABDOMINAL TIBURON 14X11

## (undated) DEVICE — TROCAR ENDOPATH XCEL 12MM 15CM

## (undated) DEVICE — PAD ABD 8X10 STERILE

## (undated) DEVICE — ELECTRODE REM PLYHSV RETURN 9

## (undated) DEVICE — GLOVE SURGICAL LATEX SZ 6

## (undated) DEVICE — SEE MEDLINE ITEM 156946

## (undated) DEVICE — SEE MEDLINE ITEM 152531

## (undated) DEVICE — DRAPE SCOPE PILLOW WARMER

## (undated) DEVICE — CATH SELF-CATH FEMALE 14FR 6IN

## (undated) DEVICE — SET INFLOW TUBE HYSTER

## (undated) DEVICE — LUBRICANT SURGILUBE 2 OZ

## (undated) DEVICE — BLADE SURG CARBON STEEL SZ11

## (undated) DEVICE — DRAPE ARM DAVINCI XI

## (undated) DEVICE — DRESSING TELFA N ADH 3X8

## (undated) DEVICE — COVER TIP CURVED SCISSORS XI

## (undated) DEVICE — TRAY MINOR GEN SURG

## (undated) DEVICE — POWDER ARISTA AH 3G

## (undated) DEVICE — SUT PDS II 0 CT-1 VIL MONO

## (undated) DEVICE — TRAY FOLEY 16FR INFECTION CONT

## (undated) DEVICE — PORT ACCESS 8MM W/120MM LOW

## (undated) DEVICE — PAD PREP 50/CA

## (undated) DEVICE — SET TRI-LUMEN FILTERED TUBE

## (undated) DEVICE — DEVICE TRUCLEAR ULTRA MINI

## (undated) DEVICE — SUT MONOCYRL 4-0 PS2 UND

## (undated) DEVICE — SOL 9P NACL IRR PIC IL

## (undated) DEVICE — COVER LIGHT HANDLE

## (undated) DEVICE — CLOSURE SKIN STERI STRIP 1/2X4